# Patient Record
Sex: FEMALE | Race: BLACK OR AFRICAN AMERICAN | Employment: OTHER | URBAN - METROPOLITAN AREA
[De-identification: names, ages, dates, MRNs, and addresses within clinical notes are randomized per-mention and may not be internally consistent; named-entity substitution may affect disease eponyms.]

---

## 2017-03-31 ENCOUNTER — HOSPITAL ENCOUNTER (OUTPATIENT)
Dept: ULTRASOUND IMAGING | Age: 62
Discharge: HOME | End: 2017-03-31
Attending: INTERNAL MEDICINE
Payer: COMMERCIAL

## 2017-03-31 DIAGNOSIS — R13.10 DIFFICULTY IN SWALLOWING: ICD-10-CM

## 2017-03-31 PROCEDURE — 76536 US EXAM OF HEAD AND NECK: CPT

## 2017-04-11 ENCOUNTER — HOSPITAL ENCOUNTER (OUTPATIENT)
Dept: ONCOLOGY | Age: 62
Discharge: HOME | End: 2017-04-11

## 2017-04-11 ENCOUNTER — HOSPITAL ENCOUNTER (OUTPATIENT)
Dept: LAB | Age: 62
Discharge: HOME | End: 2017-04-11
Payer: COMMERCIAL

## 2017-04-11 ENCOUNTER — OFFICE VISIT (OUTPATIENT)
Dept: ONCOLOGY | Age: 62
End: 2017-04-11

## 2017-04-11 VITALS
DIASTOLIC BLOOD PRESSURE: 68 MMHG | HEIGHT: 69 IN | TEMPERATURE: 97.6 F | BODY MASS INDEX: 28.29 KG/M2 | HEART RATE: 65 BPM | SYSTOLIC BLOOD PRESSURE: 131 MMHG | WEIGHT: 191 LBS

## 2017-04-11 DIAGNOSIS — C50.212 MALIGNANT NEOPLASM OF UPPER-INNER QUADRANT OF LEFT FEMALE BREAST (HCC): ICD-10-CM

## 2017-04-11 DIAGNOSIS — C50.212 MALIGNANT NEOPLASM OF UPPER-INNER QUADRANT OF LEFT FEMALE BREAST (HCC): Primary | ICD-10-CM

## 2017-04-11 LAB
ALBUMIN SERPL BCP-MCNC: 4.4 G/DL (ref 3.4–5)
ALBUMIN/GLOB SERPL: 1.2 {RATIO} (ref 0.8–1.7)
ALP SERPL-CCNC: 93 U/L (ref 45–117)
ALT SERPL-CCNC: 16 U/L (ref 13–56)
ANION GAP BLD CALC-SCNC: 9 MMOL/L (ref 3–18)
AST SERPL W P-5'-P-CCNC: 14 U/L (ref 15–37)
BASO+EOS+MONOS # BLD AUTO: 0.6 K/UL (ref 0–2.3)
BASO+EOS+MONOS # BLD AUTO: 9 % (ref 0.1–17)
BILIRUB SERPL-MCNC: 0.4 MG/DL (ref 0.2–1)
BUN SERPL-MCNC: 22 MG/DL (ref 7–18)
BUN/CREAT SERPL: 22 (ref 12–20)
CALCIUM SERPL-MCNC: 10.2 MG/DL (ref 8.5–10.1)
CHLORIDE SERPL-SCNC: 101 MMOL/L (ref 100–108)
CO2 SERPL-SCNC: 31 MMOL/L (ref 21–32)
CREAT SERPL-MCNC: 0.99 MG/DL (ref 0.6–1.3)
DIFFERENTIAL METHOD BLD: ABNORMAL
ERYTHROCYTE [DISTWIDTH] IN BLOOD BY AUTOMATED COUNT: 13.6 % (ref 11.5–14.5)
GLOBULIN SER CALC-MCNC: 3.7 G/DL (ref 2–4)
GLUCOSE SERPL-MCNC: 89 MG/DL (ref 74–99)
HCT VFR BLD AUTO: 34.2 % (ref 36–48)
HGB BLD-MCNC: 11.4 G/DL (ref 12–16)
LYMPHOCYTES # BLD AUTO: 27 % (ref 14–44)
LYMPHOCYTES # BLD: 1.9 K/UL (ref 1.1–5.9)
MCH RBC QN AUTO: 29.1 PG (ref 25–35)
MCHC RBC AUTO-ENTMCNC: 33.3 G/DL (ref 31–37)
MCV RBC AUTO: 87.2 FL (ref 78–102)
NEUTS SEG # BLD: 4.5 K/UL (ref 1.8–9.5)
NEUTS SEG NFR BLD AUTO: 65 % (ref 40–70)
PLATELET # BLD AUTO: 230 K/UL (ref 135–420)
POTASSIUM SERPL-SCNC: 3.4 MMOL/L (ref 3.5–5.5)
PROT SERPL-MCNC: 8.1 G/DL (ref 6.4–8.2)
RBC # BLD AUTO: 3.92 M/UL (ref 4.1–5.1)
SODIUM SERPL-SCNC: 141 MMOL/L (ref 136–145)
WBC # BLD AUTO: 7 K/UL (ref 4.5–13)

## 2017-04-11 PROCEDURE — 80053 COMPREHEN METABOLIC PANEL: CPT | Performed by: INTERNAL MEDICINE

## 2017-04-11 PROCEDURE — 36415 COLL VENOUS BLD VENIPUNCTURE: CPT | Performed by: INTERNAL MEDICINE

## 2017-04-11 PROCEDURE — 86300 IMMUNOASSAY TUMOR CA 15-3: CPT | Performed by: INTERNAL MEDICINE

## 2017-04-11 NOTE — PROGRESS NOTES
Hematology/Oncology Consultation Note    Name: Ailyn Cervantes  Date: 2017  : 1955    PCP: Sarah Beth Chacko MD       Ms. Devang Pickett  is a 60 y.o. -American woman who was referred for an evaluation and treatment recommendations for newly diagnosed invasive ductal adenocarcinoma of the left breast.    Subjective:     Chief complaint: Breast cancer    History of present illness:  Ms. Devang Pickett is a 54-year-old -American woman who recently underwent her screening mammograms which showed an abnormality in the left breast which was concerning. This screening mammogram was completed on 3/6/2017. On 3/15/2017 she had a unilateral diagnostic mammogram and unilateral ultrasound which documented a lesion at the 11 o'clock position in the left breast.  The patient denied ever feeling a mass lesion on self breast exam.  The tumor measured 4 x 3 x 4 mm and was subsequently biopsied on 3/17/2017. The biopsy confirmed an invasive ductal adenocarcinoma, moderately well differentiated. There was no evidence of an in situ component and there was no lymphovascular invasion identified. Further tests on the specimen revealed that the tumor was estrogen receptor positive and progesterone receptor positive but HER-2/david negative. On reviewing the notes from her surgeon she has a clinically palpable left axillary lymph node and is tentatively scheduled to undergo further testing. She denies having any nipple discharge or nipple retraction. Her family history is notable for heart disease in her mother and colon cancer in her mother but no family history of breast cancer is documented. History reviewed. No pertinent past medical history.     No Known Allergies    Past Surgical History:   Procedure Laterality Date    BREAST SURGERY PROCEDURE UNLISTED      HX HYSTERECTOMY         Social History     Social History    Marital status: UNKNOWN     Spouse name: N/A    Number of children: N/A    Years of education: N/A     Occupational History    Not on file. Social History Main Topics    Smoking status: Never Smoker    Smokeless tobacco: Not on file    Alcohol use No    Drug use: Not on file    Sexual activity: Not on file     Other Topics Concern    Not on file     Social History Narrative       Family History   Problem Relation Age of Onset    Heart Disease Mother     Cancer Mother      colon    Diabetes Father     Cancer Brother      prostate    Alzheimer Paternal Uncle     Other Maternal Grandfather      old age   [facility] Cancer Brother      colon    Alzheimer Cousin        Review of Systems    General ROS:The patient has no complaints and there is no physical distress evident. Psychological ROS: patient denies having any psychological symptoms such as hallucinations, depression or anxiety. Ophthalmic ROS:the patient denies having any visual impairment or eye discomfort. ENT ROS: there are no abnormalities reported. Allergy and Immunology ROS:the patient denies having any seasonal allergies or allergies to medications other than those already outlined above. Hematological and Lymphatic ROS: the patient denies having any bruising, bleeding or lymphadenopathy. Endocrine ROS: the patient denies having any heat or cold intolerance. There is no history of diabetes or thyroid disorders. Breast ROS: the patient has recently been diagnosed with invasive ductal adenocarcinoma of the left breast  Respiratory ROS:the patient denies having any cough, shortness of breath, or dyspnea on exertion. Cardiovascular ROS: there are no complaints of chest pain, palpitations, chest pounding, or dyspnea on exertion. Gastrointestinal ROS: the patient denies having nausea, emesis, diarrhea, constipation, or blood in the stool. Genito-Urinary ROS: the patient denies having urinary urgency, frequency, or dysuria.   Musculoskeletal ROS: with the exception of mild arthralgias the patient has no other musculoskeletal complaints. Neurological ROS: the patient denies having any numbness, tingling, or neurologic deficits. Dermatological ROS:patient denies having any unexplained rash, skin ulcerations, or hives. Objective:     Visit Vitals    /68    Pulse 65    Temp 97.6 °F (36.4 °C)    Ht 5' 9\" (1.753 m)    Wt 86.6 kg (191 lb)    BMI 28.21 kg/m2        Physical Exam:   Gen. Appearance: the patient is in no acute distress. Skin: There is no evidence of bruise or rash. HEENT: The head is normocephalic and atraumatic. The conjunctiva and sclera are clear. Pupils are equal, round, reactive to light, and accommodation. The extraocular movements are intact. ENT reveals no oral mucosal lesions or ulcerations. Neck: Supple without lymphadenopathy or thyromegaly. Anterior chest wall and breast: The left breast shows a biopsy site. There is no evidence of nipple retraction, nipple discharge, or skin dimpling. The axilla has evidence of a possible pathologically enlarged lymph node on the left. Lungs: Clear to auscultation and percussion; there are no wheezes or rhonchi. Heart: Regular rate and rhythm; there are no murmurs, gallops, or rubs. Abdomen: Bowel sounds are present and normal.  There is no guarding, tenderness, or hepatosplenomegaly. Extremities: There is no clubbing, cyanosis, or edema. Neurologic: There are no focal neurologic deficits. Lymphatics: There is no palpable peripheral lymphadenopathy. Lab data: The pathology report dated 3/17/2017 of a left breast specimen is diagnostic for invasive ductal adenocarcinoma, moderately well-differentiated. Immunohistochemical stains for the estrogen receptor and progesterone receptor positive. HER-2/david was negative.          Assessment:   Moderately well-differentiated invasive ductal adenocarcinoma, left breast: ER/OR positive and HER-2 negative    Plan:   Mildly well-differentiated invasive ductal adenocarcinoma, left breast the tumor is ER/OR positive and HER-2 negative. The patient is tentatively scheduled to undergo a lumpectomy on 4/13/2017. I have explained to the patient that we will plan to see her back in clinic in about 2 weeks following surgery to finalize her treatment regimen. I reviewed the current NCCN the main guidelines for early-stage breast cancer and since her tumor is hormone receptor positive for treatment modality will probably be comprised of a combination of post lumpectomy radiation treatment and antiestrogen therapy in the form of tamoxifen at a dose of 20 mg daily versus an aromatase inhibitor such as Femara at 2.5 mg daily. The risk and benefit profile of these options have been reviewed and she will return in 2 weeks to finalize her treatment regimen after the final pathologic staging has been completed. Follow-up in 2 weeks. Orders Placed This Encounter    COMPLETE CBC & AUTO DIFF WBC    InHouse CBC ([employer])     Standing Status:   Future     Number of Occurrences:   1     Standing Expiration Date:   8/02/2890    METABOLIC PANEL, COMPREHENSIVE     Standing Status:   Future     Number of Occurrences:   1     Standing Expiration Date:   4/12/2018    CA 27.29     Standing Status:   Future     Number of Occurrences:   1     Standing Expiration Date:   4/12/2018           Fred Witt MD  4/11/2017      Please note: This document has been produced using voice recognition software. Unrecognized errors in transcription may be present.

## 2017-04-11 NOTE — PATIENT INSTRUCTIONS
Breast Cancer: Care Instructions  Your Care Instructions  Breast cancer occurs when abnormal cells grow out of control in the breast. These cancer cells can spread within the breast, to nearby lymph nodes and other tissues, and to other parts of the body. Being treated for cancer can weaken your body, and you may feel very tired. Get the rest your body needs so you can feel better. Finding out that you have cancer is scary. You may feel many emotions and may need some help coping. Seek out family, friends, and counselors for support. You also can do things at home to make yourself feel better while you go through treatment. Call the [employer] (0-538.579.8536) or visit its website at [employer] for more information. Follow-up care is a key part of your treatment and safety. Be sure to make and go to all appointments, and call your doctor if you are having problems. It's also a good idea to know your test results and keep a list of the medicines you take. How can you care for yourself at home? · Take your medicines exactly as prescribed. Call your doctor if you think you are having a problem with your medicine. You may get medicine for nausea and vomiting if you have these side effects. · Follow your doctor's instructions to relieve pain. Pain from cancer and surgery can almost always be controlled. Use pain medicine when you first notice pain, before it becomes severe. · Eat healthy food. If you do not feel like eating, try to eat food that has protein and extra calories to keep up your strength and prevent weight loss. Drink liquid meal replacements for extra calories and protein. Try to eat your main meal early. · Get some physical activity every day, but do not get too tired. Keep doing the hobbies you enjoy as your energy allows. · Do not smoke. Smoking can make your cancer worse. If you need help quitting, talk to your doctor about stop-smoking programs and medicines.  These can increase your chances of quitting for good. · Take steps to control your stress and workload. Learn relaxation techniques. ¨ Share your feelings. Stress and tension affect our emotions. By expressing your feelings to others, you may be able to understand and cope with them. ¨ Consider joining a support group. Talking about a problem with your spouse, a good friend, or other people with similar problems is a good way to reduce tension and stress. ¨ Express yourself through art. Try writing, crafts, dance, or art to relieve stress. Some dance, writing, or art groups may be available just for people who have cancer. ¨ Be kind to your body and mind. Getting enough sleep, eating a healthy diet, and taking time to do things you enjoy can contribute to an overall feeling of balance in your life and can help reduce stress. ¨ Get help if you need it. Discuss your concerns with your doctor or counselor. · If you are vomiting or have diarrhea:  ¨ Drink plenty of fluids (enough so that your urine is light yellow or clear like water) to prevent dehydration. Choose water and other caffeine-free clear liquids. If you have kidney, heart, or liver disease and have to limit fluids, talk with your doctor before you increase the amount of fluids you drink. ¨ When you are able to eat, try clear soups, mild foods, and liquids until all symptoms are gone for 12 to 48 hours. Other good choices include dry toast, crackers, cooked cereal, and gelatin dessert, such as Jell-O.  · If you have not already done so, prepare a list of advance directives. Advance directives are instructions to your doctor and family members about what kind of care you want if you become unable to speak or express yourself. When should you call for help? Call your doctor now or seek immediate medical care if:  · You have a fever. · Any part of your breast becomes red, tender, swollen, or hot.   · You have pain, redness, or swelling in the arm on the same side as your breast cancer. Watch closely for changes in your health, and be sure to contact your doctor if:  · You have pain that is not controlled by medicine. · You have nausea or vomiting. · You are constipated or have diarrhea. Where can you learn more? Go to http://ashlie-funmilayo.info/. Enter V321 in the search box to learn more about \"Breast Cancer: Care Instructions. \"  Current as of: July 26, 2016  Content Version: 11.2  © 2840-3986 [employer] Care instructions adapted under license by [employer] (which disclaims liability or warranty for this information). If you have questions about a medical condition or this instruction, always ask your healthcare professional. [address] any warranty or liability for your use of this information.

## 2017-04-11 NOTE — MR AVS SNAPSHOT
Visit Information Date & Time Provider Department Dept. Phone Encounter #  
 4/11/2017  2:30 PM Colletta Hector, [address] Office [phone] [id number] Follow-up Instructions Return in about 2 weeks (around 4/25/2017). Your Appointments 4/25/2017  3:15 PM  
Office Visit with Colletta Hector, MD  
[facility]en[facility] Barnett ([address]) Appt Note: RESULTS  
 [facility] [address] [facility] [address] [facility] [address]  
[phone]  
  
   
 [facility] [address] [address] [place] Upcoming Health Maintenance Date Due Hepatitis C Screening 1955 DTaP/Tdap/Td series (1 - Tdap) 2/14/1975 PAP AKA CERVICAL CYTOLOGY 2/14/1975 FOBT Q 1 YEAR AGE 50-75 2/14/2004 ZOSTER VACCINE AGE 60> 2/14/2014 INFLUENZA AGE 9 TO ADULT 8/1/2016 BREAST CANCER SCRN MAMMOGRAM 3/25/2018 Allergies as of 4/11/2017  Never Reviewed Not on File Current Immunizations  Never Reviewed No immunizations on file. Not reviewed this visit You Were Diagnosed With   
  
 Codes Comments Malignant neoplasm of upper-inner quadrant of left female breast [facility])    -  Primary ICD-10-CM: E80.573 ICD-9-CM: 174.2 Your Updated Medication List  
  
Notice  As of 4/11/2017  3:33 PM  
 You have not been prescribed any medications. Follow-up Instructions Return in about 2 weeks (around 4/25/2017). To-Do List   
 04/17/2017 8:30 AM  
  Appointment with [facility] RADIATION ONCOLOGY at [facility] RAD[facility] ([phone]) This appointment time is simply a place kelly and may not reflect the true appointment time. If patient does not know the appointment time given to them at the time of scheduling, they should contact the Radiation Therapy department for detail. Patient Instructions Breast Cancer: Care Instructions Your Care Instructions Breast cancer occurs when abnormal cells grow out of control in the breast. These cancer cells can spread within the breast, to nearby lymph nodes and other tissues, and to other parts of the body. Being treated for cancer can weaken your body, and you may feel very tired. Get the rest your body needs so you can feel better. Finding out that you have cancer is scary. You may feel many emotions and may need some help coping. Seek out family, friends, and counselors for support. You also can do things at home to make yourself feel better while you go through treatment. Call the [employer] (1-728.667.3233) or visit its website at [employer] for more information. Follow-up care is a key part of your treatment and safety. Be sure to make and go to all appointments, and call your doctor if you are having problems. It's also a good idea to know your test results and keep a list of the medicines you take. How can you care for yourself at home? · Take your medicines exactly as prescribed. Call your doctor if you think you are having a problem with your medicine. You may get medicine for nausea and vomiting if you have these side effects. · Follow your doctor's instructions to relieve pain. Pain from cancer and surgery can almost always be controlled. Use pain medicine when you first notice pain, before it becomes severe. · Eat healthy food. If you do not feel like eating, try to eat food that has protein and extra calories to keep up your strength and prevent weight loss. Drink liquid meal replacements for extra calories and protein. Try to eat your main meal early. · Get some physical activity every day, but do not get too tired. Keep doing the hobbies you enjoy as your energy allows. · Do not smoke. Smoking can make your cancer worse. If you need help quitting, talk to your doctor about stop-smoking programs and medicines. These can increase your chances of quitting for good. · Take steps to control your stress and workload. Learn relaxation techniques. ¨ Share your feelings. Stress and tension affect our emotions. By expressing your feelings to others, you may be able to understand and cope with them. ¨ Consider joining a support group. Talking about a problem with your spouse, a good friend, or other people with similar problems is a good way to reduce tension and stress. ¨ Express yourself through art. Try writing, crafts, dance, or art to relieve stress. Some dance, writing, or art groups may be available just for people who have cancer. ¨ Be kind to your body and mind. Getting enough sleep, eating a healthy diet, and taking time to do things you enjoy can contribute to an overall feeling of balance in your life and can help reduce stress. ¨ Get help if you need it. Discuss your concerns with your doctor or counselor. · If you are vomiting or have diarrhea: ¨ Drink plenty of fluids (enough so that your urine is light yellow or clear like water) to prevent dehydration. Choose water and other caffeine-free clear liquids. If you have kidney, heart, or liver disease and have to limit fluids, talk with your doctor before you increase the amount of fluids you drink. ¨ When you are able to eat, try clear soups, mild foods, and liquids until all symptoms are gone for 12 to 48 hours. Other good choices include dry toast, crackers, cooked cereal, and gelatin dessert, such as Jell-O. · If you have not already done so, prepare a list of advance directives. Advance directives are instructions to your doctor and family members about what kind of care you want if you become unable to speak or express yourself. When should you call for help? Call your doctor now or seek immediate medical care if: 
· You have a fever. · Any part of your breast becomes red, tender, swollen, or hot. · You have pain, redness, or swelling in the arm on the same side as your breast cancer. Watch closely for changes in your health, and be sure to contact your doctor if: · You have pain that is not controlled by medicine. · You have nausea or vomiting. · You are constipated or have diarrhea. Where can you learn more? Go to http://ashlie-funmilayo.info/. Enter V321 in the search box to learn more about \"Breast Cancer: Care Instructions. \" Current as of: July 26, 2016 Content Version: 11.2 © 6724-9563 [employer]. Care instructions adapted under license by [employer] (which disclaims liability or warranty for this information). If you have questions about a medical condition or this instruction, always ask your healthcare professional. [address] any warranty or liability for your use of this information. Please provide this summary of care documentation to your next provider. Your primary care clinician is listed as Luis Fernando Cardenas. If you have any questions after today's visit, please call [phone].

## 2017-04-12 LAB — CANCER AG27-29 SERPL-ACNC: 18.3 U/ML (ref 0–38.6)

## 2017-04-17 ENCOUNTER — HOSPITAL ENCOUNTER (OUTPATIENT)
Dept: RADIATION THERAPY | Age: 62
Discharge: HOME | End: 2017-04-17
Payer: COMMERCIAL

## 2017-04-17 PROCEDURE — 99211 OFF/OP EST MAY X REQ PHY/QHP: CPT

## 2017-04-25 ENCOUNTER — OFFICE VISIT (OUTPATIENT)
Dept: ONCOLOGY | Age: 62
End: 2017-04-25

## 2017-04-25 VITALS
TEMPERATURE: 98.1 F | WEIGHT: 190 LBS | HEIGHT: 69 IN | DIASTOLIC BLOOD PRESSURE: 74 MMHG | HEART RATE: 54 BPM | BODY MASS INDEX: 28.14 KG/M2 | SYSTOLIC BLOOD PRESSURE: 147 MMHG

## 2017-04-25 DIAGNOSIS — C50.212 MALIGNANT NEOPLASM OF UPPER-INNER QUADRANT OF LEFT FEMALE BREAST (HCC): Primary | ICD-10-CM

## 2017-04-25 RX ORDER — LETROZOLE 2.5 MG/1
2.5 TABLET, FILM COATED ORAL DAILY
Qty: 90 TAB | Refills: 3 | Status: SHIPPED | OUTPATIENT
Start: 2017-04-25 | End: 2018-04-04 | Stop reason: SDUPTHER

## 2017-04-25 NOTE — MR AVS SNAPSHOT
Visit Information Date & Time Provider Department Dept. Phone Encounter #  
 4/25/2017  3:15 PM Anushka Aldana [address] Office [phone] [id number] Follow-up Instructions Return in about 4 weeks (around 5/23/2017). Your Appointments 5/23/2017  3:30 PM  
Office Visit with MD Deven Riggs [address] [place] [address]-[facility]) Appt Note: 1 month follow up appointment/Amairani Ritter  
 [facility] [address] [facility] [address] [facility] [address]  
[phone]  
  
   
 [facility] [address] [place] [address] [address] [place] Upcoming Health Maintenance Date Due Hepatitis C Screening 1955 Pneumococcal 19-64 Highest Risk (1 of 3 - PCV13) 2/14/1973 DTaP/Tdap/Td series (1 - Tdap) 2/14/1975 PAP AKA CERVICAL CYTOLOGY 2/14/1975 FOBT Q 1 YEAR AGE 50-75 2/14/2004 ZOSTER VACCINE AGE 60> 2/14/2014 INFLUENZA AGE 9 TO ADULT 8/1/2016 BREAST CANCER SCRN MAMMOGRAM 3/25/2018 Allergies as of 4/25/2017  Review Complete On: 4/23/2017 By: Anushka Aldana MD  
 No Known Allergies Current Immunizations  Never Reviewed No immunizations on file. Not reviewed this visit You Were Diagnosed With   
  
 Codes Comments Malignant neoplasm of upper-inner quadrant of left female breast [facility])    -  Primary ICD-10-CM: L36.323 ICD-9-CM: 174.2 Vitals BP Pulse Temp Height(growth percentile) Weight(growth percentile) BMI  
 147/74 (!) 54 98.1 °F (36.7 °C) 5' 9\" (1.753 m) 190 lb (86.2 kg) 28.06 kg/m2 Smoking Status Never Smoker BMI and BSA Data Body Mass Index Body Surface Area 28.06 kg/m 2 2.05 m 2 Preferred Pharmacy Pharmacy Name Phone [facility] [address] [phone] Your Updated Medication List  
  
   
This list is accurate as of: 4/25/17  4:26 PM.  Always use your most recent med list.  
  
  
  
  
 letrozole 2.5 mg tablet Commonly known as:  [facility] Take 1 Tab by mouth daily. Prescriptions Sent to Pharmacy Refills  
 letrozole (FEMARA) 2.5 mg tablet 3 Sig: Take 1 Tab by mouth daily. Class: Normal  
 Pharmacy: [facility] [address]  #: [phone] Route: Oral  
  
Follow-up Instructions Return in about 4 weeks (around 5/23/2017). To-Do List   
 05/11/2017 8:00 AM  
  Appointment with [facility] RADIATION ONCOLOGY at [facility] RADIATION THERAPY ([phone]) This appointment time is simply a place kelly and may not reflect the true appointment time. If patient does not know the appointment time given to them at the time of scheduling, they should contact the Radiation Therapy department for detail. Patient Instructions Breast Cancer: Care Instructions Your Care Instructions Breast cancer occurs when abnormal cells grow out of control in the breast. These cancer cells can spread within the breast, to nearby lymph nodes and other tissues, and to other parts of the body. Being treated for cancer can weaken your body, and you may feel very tired. Get the rest your body needs so you can feel better. Finding out that you have cancer is scary. You may feel many emotions and may need some help coping. Seek out family, friends, and counselors for support. You also can do things at home to make yourself feel better while you go through treatment. Call the [facility] (5-827.605.8273) or visit its website at [address]. [employer] for more information. Follow-up care is a key part of your treatment and safety. Be sure to make and go to all appointments, and call your doctor if you are having problems. It's also a good idea to know your test results and keep a list of the medicines you take. How can you care for yourself at home? · Take your medicines exactly as prescribed.  Call your doctor if you think you are having a problem with your medicine. You may get medicine for nausea and vomiting if you have these side effects. · Follow your doctor's instructions to relieve pain. Pain from cancer and surgery can almost always be controlled. Use pain medicine when you first notice pain, before it becomes severe. · Eat healthy food. If you do not feel like eating, try to eat food that has protein and extra calories to keep up your strength and prevent weight loss. Drink liquid meal replacements for extra calories and protein. Try to eat your main meal early. · Get some physical activity every day, but do not get too tired. Keep doing the hobbies you enjoy as your energy allows. · Do not smoke. Smoking can make your cancer worse. If you need help quitting, talk to your doctor about stop-smoking programs and medicines. These can increase your chances of quitting for good. · Take steps to control your stress and workload. Learn relaxation techniques. ¨ Share your feelings. Stress and tension affect our emotions. By expressing your feelings to others, you may be able to understand and cope with them. ¨ Consider joining a support group. Talking about a problem with your spouse, a good friend, or other people with similar problems is a good way to reduce tension and stress. ¨ Express yourself through art. Try writing, crafts, dance, or art to relieve stress. Some dance, writing, or art groups may be available just for people who have cancer. ¨ Be kind to your body and mind. Getting enough sleep, eating a healthy diet, and taking time to do things you enjoy can contribute to an overall feeling of balance in your life and can help reduce stress. ¨ Get help if you need it. Discuss your concerns with your doctor or counselor. · If you are vomiting or have diarrhea: ¨ Drink plenty of fluids (enough so that your urine is light yellow or clear like water) to prevent dehydration.  Choose water and other caffeine-free clear liquids. If you have kidney, heart, or liver disease and have to limit fluids, talk with your doctor before you increase the amount of fluids you drink. ¨ When you are able to eat, try clear soups, mild foods, and liquids until all symptoms are gone for 12 to 48 hours. Other good choices include dry toast, crackers, cooked cereal, and gelatin dessert, such as Jell-O. · If you have not already done so, prepare a list of advance directives. Advance directives are instructions to your doctor and family members about what kind of care you want if you become unable to speak or express yourself. When should you call for help? Call your doctor now or seek immediate medical care if: 
· You have a fever. · Any part of your breast becomes red, tender, swollen, or hot. · You have pain, redness, or swelling in the arm on the same side as your breast cancer. Watch closely for changes in your health, and be sure to contact your doctor if: 
· You have pain that is not controlled by medicine. · You have nausea or vomiting. · You are constipated or have diarrhea. Where can you learn more? Go to http://ashlie-funmilayo.info/. Enter V321 in the search box to learn more about \"Breast Cancer: Care Instructions. \" Current as of: July 26, 2016 Content Version: 11.2 © 8225-8547 [employer]. Care instructions adapted under license by Remember [employer] (which disclaims liability or warranty for this information). If you have questions about a medical condition or this instruction, always ask your healthcare professional. [place] [address] any warranty or liability for your use of this information. Please provide this summary of care documentation to your next provider. Your primary care clinician is listed as Jhonathan Chand. If you have any questions after today's visit, please call [phone].

## 2017-04-25 NOTE — PROGRESS NOTES
Hematology/medical oncology progress note    4/25/2017  Davie Lechuga  YOB: 1955    PCP: Dr. 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 The tumor is HER-2 negative and lymph node negative    Ms. Enoc Duarte has completed her lumpectomy and is currently being evaluated for postsurgical radiation treatment by the radiation oncologist.  I have informed the patient that her tumor is hormone receptor positive and HER-2 negative. She is postmenopausal and therefore I have recommended antiestrogen therapy in the form of an aromatase inhibitor, Femara. The dose is 2.5 mg daily. I have reviewed the risk, benefits, and side effects of the treatment modality with the patient. Written information on the medication was provided to the patient for her review. She has consented to proceed with treatment as recommended and therefore a prescription for a 90 day supply of Femara was submitted to her pharmacy. She also has 3 refills for a total of 1 full year of medication. The patient had her questions answered to her satisfaction. I will plan to see her back in clinic in 1 month to see how well she is tolerating the medication and she will be seen at 3 month intervals thereafter. The baseline CA-27-29 level was normal at 18.3 U/mL. The patient understands that her treatment modality for early stage breast cancer is for curative intent. Total time 25 minutes, greater than 50% of the time was in counseling and coordination of care.     Julieann Canavan, MD, [place]

## 2017-04-25 NOTE — PATIENT INSTRUCTIONS
Breast Cancer: Care Instructions  Your Care Instructions  Breast cancer occurs when abnormal cells grow out of control in the breast. These cancer cells can spread within the breast, to nearby lymph nodes and other tissues, and to other parts of the body. Being treated for cancer can weaken your body, and you may feel very tired. Get the rest your body needs so you can feel better. Finding out that you have cancer is scary. You may feel many emotions and may need some help coping. Seek out family, friends, and counselors for support. You also can do things at home to make yourself feel better while you go through treatment. Call the [employer] (0-105.797.1776) or visit its website at Jelly[employer]aster[employer]o for more information. Follow-up care is a key part of your treatment and safety. Be sure to make and go to all appointments, and call your doctor if you are having problems. It's also a good idea to know your test results and keep a list of the medicines you take. How can you care for yourself at home? · Take your medicines exactly as prescribed. Call your doctor if you think you are having a problem with your medicine. You may get medicine for nausea and vomiting if you have these side effects. · Follow your doctor's instructions to relieve pain. Pain from cancer and surgery can almost always be controlled. Use pain medicine when you first notice pain, before it becomes severe. · Eat healthy food. If you do not feel like eating, try to eat food that has protein and extra calories to keep up your strength and prevent weight loss. Drink liquid meal replacements for extra calories and protein. Try to eat your main meal early. · Get some physical activity every day, but do not get too tired. Keep doing the hobbies you enjoy as your energy allows. · Do not smoke. Smoking can make your cancer worse. If you need help quitting, talk to your doctor about stop-smoking programs and medicines.  These can increase your chances of quitting for good. · Take steps to control your stress and workload. Learn relaxation techniques. ¨ Share your feelings. Stress and tension affect our emotions. By expressing your feelings to others, you may be able to understand and cope with them. ¨ Consider joining a support group. Talking about a problem with your spouse, a good friend, or other people with similar problems is a good way to reduce tension and stress. ¨ Express yourself through art. Try writing, crafts, dance, or art to relieve stress. Some dance, writing, or art groups may be available just for people who have cancer. ¨ Be kind to your body and mind. Getting enough sleep, eating a healthy diet, and taking time to do things you enjoy can contribute to an overall feeling of balance in your life and can help reduce stress. ¨ Get help if you need it. Discuss your concerns with your doctor or counselor. · If you are vomiting or have diarrhea:  ¨ Drink plenty of fluids (enough so that your urine is light yellow or clear like water) to prevent dehydration. Choose water and other caffeine-free clear liquids. If you have kidney, heart, or liver disease and have to limit fluids, talk with your doctor before you increase the amount of fluids you drink. ¨ When you are able to eat, try clear soups, mild foods, and liquids until all symptoms are gone for 12 to 48 hours. Other good choices include dry toast, crackers, cooked cereal, and gelatin dessert, such as Jell-O.  · If you have not already done so, prepare a list of advance directives. Advance directives are instructions to your doctor and family members about what kind of care you want if you become unable to speak or express yourself. When should you call for help? Call your doctor now or seek immediate medical care if:  · You have a fever. · Any part of your breast becomes red, tender, swollen, or hot.   · You have pain, redness, or swelling in the arm on the same side as your breast cancer. Watch closely for changes in your health, and be sure to contact your doctor if:  · You have pain that is not controlled by medicine. · You have nausea or vomiting. · You are constipated or have diarrhea. Where can you learn more? Go to http://ashlie-funmilayo.info/. Enter V321 in the search box to learn more about \"Breast Cancer: Care Instructions. \"  Current as of: July 26, 2016  Content Version: 11.2  © 0585-0326 [employer]. Care instructions adapted under license by [employer] (which disclaims liability or warranty for this information). If you have questions about a medical condition or this instruction, always ask your healthcare professional. [address] any warranty or liability for your use of this information.

## 2017-04-27 ENCOUNTER — TELEPHONE (OUTPATIENT)
Dept: ONCOLOGY | Age: 62
End: 2017-04-27

## 2017-04-27 NOTE — TELEPHONE ENCOUNTER
Patient wants to be sure you are aware that she takes allergy shots every 3 weeks for dust, dirt, mold, dog, cat and wants to know if that will have any effect on the LETROZOLE. She is at [phone]. If she is not home please leave a message.

## 2017-05-08 ENCOUNTER — HOSPITAL ENCOUNTER (OUTPATIENT)
Dept: LAB | Age: 62
Discharge: HOME | End: 2017-05-08
Payer: COMMERCIAL

## 2017-05-08 LAB — POTASSIUM SERPL-SCNC: 3.7 MMOL/L (ref 3.5–5.5)

## 2017-05-08 PROCEDURE — 84132 ASSAY OF SERUM POTASSIUM: CPT | Performed by: INTERNAL MEDICINE

## 2017-05-08 PROCEDURE — 36415 COLL VENOUS BLD VENIPUNCTURE: CPT | Performed by: INTERNAL MEDICINE

## 2017-05-11 ENCOUNTER — HOSPITAL ENCOUNTER (OUTPATIENT)
Dept: RADIATION THERAPY | Age: 62
Discharge: HOME | End: 2017-05-11
Payer: COMMERCIAL

## 2017-05-11 PROCEDURE — 77290 THER RAD SIMULAJ FIELD CPLX: CPT

## 2017-05-11 PROCEDURE — 77332 RADIATION TREATMENT AID(S): CPT

## 2017-05-18 ENCOUNTER — HOSPITAL ENCOUNTER (OUTPATIENT)
Dept: RADIATION THERAPY | Age: 62
Discharge: HOME | End: 2017-05-18
Payer: COMMERCIAL

## 2017-05-18 PROCEDURE — 77295 3-D RADIOTHERAPY PLAN: CPT

## 2017-05-18 PROCEDURE — 77334 RADIATION TREATMENT AID(S): CPT

## 2017-05-18 PROCEDURE — 77300 RADIATION THERAPY DOSE PLAN: CPT

## 2017-05-19 ENCOUNTER — HOSPITAL ENCOUNTER (OUTPATIENT)
Dept: RADIATION THERAPY | Age: 62
Discharge: HOME | End: 2017-05-19
Payer: COMMERCIAL

## 2017-05-19 PROCEDURE — 77280 THER RAD SIMULAJ FIELD SMPL: CPT

## 2017-05-22 ENCOUNTER — HOSPITAL ENCOUNTER (OUTPATIENT)
Dept: RADIATION THERAPY | Age: 62
Discharge: HOME | End: 2017-05-22
Payer: COMMERCIAL

## 2017-05-22 PROCEDURE — 77412 RADIATION TX DELIVERY LVL 3: CPT

## 2017-05-23 ENCOUNTER — OFFICE VISIT (OUTPATIENT)
Dept: ONCOLOGY | Age: 62
End: 2017-05-23

## 2017-05-23 ENCOUNTER — HOSPITAL ENCOUNTER (OUTPATIENT)
Dept: RADIATION THERAPY | Age: 62
Discharge: HOME | End: 2017-05-23
Payer: COMMERCIAL

## 2017-05-23 ENCOUNTER — HOSPITAL ENCOUNTER (OUTPATIENT)
Dept: ONCOLOGY | Age: 62
Discharge: HOME | End: 2017-05-23

## 2017-05-23 VITALS
WEIGHT: 189 LBS | TEMPERATURE: 98.1 F | HEART RATE: 69 BPM | BODY MASS INDEX: 27.99 KG/M2 | SYSTOLIC BLOOD PRESSURE: 127 MMHG | HEIGHT: 69 IN | DIASTOLIC BLOOD PRESSURE: 62 MMHG

## 2017-05-23 DIAGNOSIS — C50.212 MALIGNANT NEOPLASM OF UPPER-INNER QUADRANT OF LEFT FEMALE BREAST (HCC): Primary | ICD-10-CM

## 2017-05-23 DIAGNOSIS — C50.212 MALIGNANT NEOPLASM OF UPPER-INNER QUADRANT OF LEFT FEMALE BREAST (HCC): ICD-10-CM

## 2017-05-23 LAB
BASO+EOS+MONOS # BLD AUTO: 0.6 K/UL (ref 0–2.3)
BASO+EOS+MONOS # BLD AUTO: 8 % (ref 0.1–17)
DIFFERENTIAL METHOD BLD: ABNORMAL
ERYTHROCYTE [DISTWIDTH] IN BLOOD BY AUTOMATED COUNT: 13.5 % (ref 11.5–14.5)
HCT VFR BLD AUTO: 33.8 % (ref 36–48)
HGB BLD-MCNC: 11.4 G/DL (ref 12–16)
LYMPHOCYTES # BLD AUTO: 25 % (ref 14–44)
LYMPHOCYTES # BLD: 1.9 K/UL (ref 1.1–5.9)
MCH RBC QN AUTO: 29.6 PG (ref 25–35)
MCHC RBC AUTO-ENTMCNC: 33.7 G/DL (ref 31–37)
MCV RBC AUTO: 87.8 FL (ref 78–102)
NEUTS SEG # BLD: 5.1 K/UL (ref 1.8–9.5)
NEUTS SEG NFR BLD AUTO: 67 % (ref 40–70)
PLATELET # BLD AUTO: 222 K/UL (ref 135–420)
RBC # BLD AUTO: 3.85 M/UL (ref 4.1–5.1)
WBC # BLD AUTO: 7.6 K/UL (ref 4.5–13)

## 2017-05-23 PROCEDURE — 77412 RADIATION TX DELIVERY LVL 3: CPT

## 2017-05-23 NOTE — PROGRESS NOTES
Hematology/medical oncology progress note    5/23/2017  Lolita Holt  YOB: 1955    PCP: Dr. Roman Hamilton    Diagnosis: Stage I invasive ductal adenocarcinoma of the left breast, ER positive and VA positive. The tumor is HER-2 negative and lymph node negative    Ms. Ana Cristina Hollingsworth has completed her lumpectomy and is currently being evaluated for postsurgical radiation treatment by the radiation oncologist.  I have informed the patient that her tumor is hormone receptor positive and HER-2 negative. She is postmenopausal and therefore I have recommended antiestrogen therapy in the form of an aromatase inhibitor, Femara. The dose is 2.5 mg daily. I have reviewed the risk, benefits, and side effects of the treatment modality with the patient. Written information on the medication was provided to the patient for her review. She has consented to proceed with treatment as recommended and therefore a prescription for a 90 day supply of Femara was submitted to her pharmacy. She also has 3 refills for a total of 1 full year of medication. The patient had her questions answered to her satisfaction. Today, she is here reporting that she is tolerating the medication well. She has not experienced any untoward side effects. On reviewing her CBC from today she has a mild anemia with a hemoglobin of 11.4 and hematocrit of 33.8%. I recommended that she begin using over-the-counter ferrous sulfate or Slow Fe once daily. I will check iron profile and ferritin levels at this time. The patient is proceeding with external beam radiation therapy. I will plan to see her back in clinic in about 3 months for a complete reassessment. She had additional questions answered to her satisfaction regarding the Femara and the use of oral iron supplementation. Total time 25 minutes, greater than 50% of the time was in counseling and coordination of care.     Fortunato Marino MD, [place]

## 2017-05-23 NOTE — PATIENT INSTRUCTIONS
Breast Cancer: Care Instructions  Your Care Instructions  Breast cancer occurs when abnormal cells grow out of control in the breast. These cancer cells can spread within the breast, to nearby lymph nodes and other tissues, and to other parts of the body. Being treated for cancer can weaken your body, and you may feel very tired. Get the rest your body needs so you can feel better. Finding out that you have cancer is scary. You may feel many emotions and may need some help coping. Seek out family, friends, and counselors for support. You also can do things at home to make yourself feel better while you go through treatment. Call the [employer] (5-653.428.6499) or visit its website at [employer] for more information. Follow-up care is a key part of your treatment and safety. Be sure to make and go to all appointments, and call your doctor if you are having problems. It's also a good idea to know your test results and keep a list of the medicines you take. How can you care for yourself at home? · Take your medicines exactly as prescribed. Call your doctor if you think you are having a problem with your medicine. You may get medicine for nausea and vomiting if you have these side effects. · Follow your doctor's instructions to relieve pain. Pain from cancer and surgery can almost always be controlled. Use pain medicine when you first notice pain, before it becomes severe. · Eat healthy food. If you do not feel like eating, try to eat food that has protein and extra calories to keep up your strength and prevent weight loss. Drink liquid meal replacements for extra calories and protein. Try to eat your main meal early. · Get some physical activity every day, but do not get too tired. Keep doing the hobbies you enjoy as your energy allows. · Do not smoke. Smoking can make your cancer worse. If you need help quitting, talk to your doctor about stop-smoking programs and medicines.  These can increase your chances of quitting for good. · Take steps to control your stress and workload. Learn relaxation techniques. ¨ Share your feelings. Stress and tension affect our emotions. By expressing your feelings to others, you may be able to understand and cope with them. ¨ Consider joining a support group. Talking about a problem with your spouse, a good friend, or other people with similar problems is a good way to reduce tension and stress. ¨ Express yourself through art. Try writing, crafts, dance, or art to relieve stress. Some dance, writing, or art groups may be available just for people who have cancer. ¨ Be kind to your body and mind. Getting enough sleep, eating a healthy diet, and taking time to do things you enjoy can contribute to an overall feeling of balance in your life and can help reduce stress. ¨ Get help if you need it. Discuss your concerns with your doctor or counselor. · If you are vomiting or have diarrhea:  ¨ Drink plenty of fluids (enough so that your urine is light yellow or clear like water) to prevent dehydration. Choose water and other caffeine-free clear liquids. If you have kidney, heart, or liver disease and have to limit fluids, talk with your doctor before you increase the amount of fluids you drink. ¨ When you are able to eat, try clear soups, mild foods, and liquids until all symptoms are gone for 12 to 48 hours. Other good choices include dry toast, crackers, cooked cereal, and gelatin dessert, such as Jell-O.  · If you have not already done so, prepare a list of advance directives. Advance directives are instructions to your doctor and family members about what kind of care you want if you become unable to speak or express yourself. When should you call for help? Call your doctor now or seek immediate medical care if:  · You have a fever. · Any part of your breast becomes red, tender, swollen, or hot.   · You have pain, redness, or swelling in the arm on the same side as your breast cancer. Watch closely for changes in your health, and be sure to contact your doctor if:  · You have pain that is not controlled by medicine. · You have nausea or vomiting. · You are constipated or have diarrhea. Where can you learn more? Go to http://ashlie-funmilayo.info/. Enter V321 in the search box to learn more about \"Breast Cancer: Care Instructions. \"  Current as of: July 26, 2016  Content Version: 11.2  © 1331-8352 [employer]. Care instructions adapted under license by [employer] (which disclaims liability or warranty for this information). If you have questions about a medical condition or this instruction, always ask your healthcare professional. [address] any warranty or liability for your use of this information.

## 2017-05-23 NOTE — MR AVS SNAPSHOT
Visit Information Date & Time Provider Department Dept. Phone Encounter #  
 5/23/2017  3:30 PM Lauryn Perdomo [facility] Office [phone] [id number] Follow-up Instructions Return in about 3 months (around 8/23/2017). Your Appointments 8/25/2017 10:00 AM  
Office Visit with Lauryn Perdomo MD  
[place]  [address] [place] [address]  
[phone]  
  
   
 [address] Upcoming Health Maintenance Date Due Hepatitis C Screening 1955 Pneumococcal 19-64 Highest Risk (1 of 3 - PCV13) 2/14/1973 DTaP/Tdap/Td series (1 - Tdap) 2/14/1975 PAP AKA CERVICAL CYTOLOGY 2/14/1975 FOBT Q 1 YEAR AGE 50-75 2/14/2004 ZOSTER VACCINE AGE 60> 2/14/2014 INFLUENZA AGE 9 TO ADULT 8/1/2017 BREAST CANCER SCRN MAMMOGRAM 3/25/2018 Allergies as of 5/23/2017  Review Complete On: 5/23/2017 By: Lauryn Perdomo MD  
 No Known Allergies Current Immunizations  Never Reviewed No immunizations on file. Not reviewed this visit You Were Diagnosed With   
  
 Codes Comments Malignant neoplasm of upper-inner quadrant of left female breast [facility])    -  Primary ICD-10-CM: M48.139 ICD-9-CM: 174.2 Vitals BP Pulse Temp Height(growth percentile) Weight(growth percentile) BMI  
 127/62 69 98.1 °F (36.7 °C) 5' 9\" (1.753 m) 189 lb (85.7 kg) 27.91 kg/m2 Smoking Status Never Smoker BMI and BSA Data Body Mass Index Body Surface Area  
 27.91 kg/m 2 2.04 m 2 Preferred Pharmacy Pharmacy Name Phone [facility] [address] [phone] Your Updated Medication List  
  
   
This list is accurate as of: 5/23/17  3:45 PM.  Always use your most recent med list.  
  
  
  
  
 letrozole 2.5 mg tablet Commonly known as:  [facility] Take 1 Tab by mouth daily. We Performed the Following COMPLETE CBC & AUTO DIFF WBC [63255 CPT(R)] Follow-up Instructions Return in about 3 months (around 8/23/2017). To-Do List   
 05/23/2017 Lab:  CBC WITH 3 PART DIFF   
  
 05/24/2017 8:00 AM  
  Appointment with [facility] RADIATION ONCOLOGY at [facility] RADIATION [facility] ([phone]) This appointment time is simply a place kelly and may not reflect the true appointment time. If patient does not know the appointment time given to them at the time of scheduling, they should contact the Radiation Therapy department for detail. 05/25/2017 8:00 AM  
  Appointment with [facility] RADIATION ONCOLOGY at [facility] ([phone]) This appointment time is simply a place kelly and may not reflect the true appointment time. If patient does not know the appointment time given to them at the time of scheduling, they should contact the Radiation Therapy department for detail.  
  
 05/26/2017 8:00 AM  
  Appointment with [facility] RADIATION ONCOLOGY at [facility] ([phone]) This appointment time is simply a place kelly and may not reflect the true appointment time. If patient does not know the appointment time given to them at the time of scheduling, they should contact the Radiation Therapy department for detail. 05/30/2017 8:00 AM  
  Appointment with [facility] RADIATION ONCOLOGY at [facility] RADIATION [facility] ([phone]) This appointment time is simply a place kelly and may not reflect the true appointment time. If patient does not know the appointment time given to them at the time of scheduling, they should contact the Radiation Therapy department for detail. 05/31/2017 8:00 AM  
  Appointment with [facility] RADIATION ONCOLOGY at [facility] RADIATION [facility] ([phone]) This appointment time is simply a place kelly and may not reflect the true appointment time.   If patient does not know the appointment time given to them at the time of scheduling, they should contact the Radiation Therapy department for detail. 06/01/2017 8:00 AM  
  Appointment with [facility] RADIATION ONCOLOGY at [facility] RADIATION [facility] ([phone]) This appointment time is simply a place kelly and may not reflect the true appointment time. If patient does not know the appointment time given to them at the time of scheduling, they should contact the Radiation Therapy department for detail. 06/02/2017 8:00 AM  
  Appointment with [facility] RADIATION ONCOLOGY at [facility] RADIATION [facility] ([phone]) This appointment time is simply a place kelly and may not reflect the true appointment time. If patient does not know the appointment time given to them at the time of scheduling, they should contact the Radiation Therapy department for detail. 06/05/2017 8:00 AM  
  Appointment with [facility] RADIATION ONCOLOGY at [facility] RADIATION [facility] ([phone]) This appointment time is simply a place kelly and may not reflect the true appointment time. If patient does not know the appointment time given to them at the time of scheduling, they should contact the Radiation Therapy department for detail. 06/06/2017 8:00 AM  
  Appointment with [facility] RADIATION ONCOLOGY at [facility] ([phone]) This appointment time is simply a place kelly and may not reflect the true appointment time. If patient does not know the appointment time given to them at the time of scheduling, they should contact the Radiation Therapy department for detail. 06/07/2017 8:00 AM  
  Appointment with [facility] RADIATION ONCOLOGY at [facility] RADIATION [facility] ([phone]) This appointment time is simply a place kelly and may not reflect the true appointment time. If patient does not know the appointment time given to them at the time of scheduling, they should contact the Radiation Therapy department for detail.   
  
 06/08/2017 8:00 AM  
 Appointment with [facility] RADIATION ONCOLOGY at [facility] RADIATION [facility] ([phone]) This appointment time is simply a place kelly and may not reflect the true appointment time. If patient does not know the appointment time given to them at the time of scheduling, they should contact the Radiation Therapy department for detail. 06/09/2017 8:00 AM  
  Appointment with [facility] RADIATION ONCOLOGY at [facility] RADIATION [facility] ([phone]) This appointment time is simply a place kelly and may not reflect the true appointment time. If patient does not know the appointment time given to them at the time of scheduling, they should contact the Radiation Therapy department for detail.  
  
 06/12/2017 8:00 AM  
  Appointment with [facility] RADIATION ONCOLOGY at [facility] RADIATION [facility] ([phone]) This appointment time is simply a place kelly and may not reflect the true appointment time. If patient does not know the appointment time given to them at the time of scheduling, they should contact the Radiation Therapy department for detail.  
  
 06/13/2017 8:00 AM  
  Appointment with [facility] RADIATION ONCOLOGY at [facility] RADIATION [facility] ([phone]) This appointment time is simply a place kelly and may not reflect the true appointment time. If patient does not know the appointment time given to them at the time of scheduling, they should contact the Radiation Therapy department for detail.  
  
 06/14/2017 8:00 AM  
  Appointment with [facility] RADIATION ONCOLOGY at [facility] RADIATION THERAPY ([phone]) This appointment time is simply a place kelly and may not reflect the true appointment time. If patient does not know the appointment time given to them at the time of scheduling, they should contact the Radiation Therapy department for detail.  
  
 06/15/2017 8:00 AM  
  Appointment with [facility] RADIATION ONCOLOGY at [facility] RADIATION THERAPY ([phone]) This appointment time is simply a place kelly and may not reflect the true appointment time. If patient does not know the appointment time given to them at the time of scheduling, they should contact the Radiation Therapy department for detail.  
  
 06/16/2017 8:00 AM  
  Appointment with [facility] RADIATION ONCOLOGY at [facility] RADIATION THERAPY ([phone]) This appointment time is simply a place kelly and may not reflect the true appointment time. If patient does not know the appointment time given to them at the time of scheduling, they should contact the Radiation Therapy department for detail. Patient Instructions Breast Cancer: Care Instructions Your Care Instructions Breast cancer occurs when abnormal cells grow out of control in the breast. These cancer cells can spread within the breast, to nearby lymph nodes and other tissues, and to other parts of the body. Being treated for cancer can weaken your body, and you may feel very tired. Get the rest your body needs so you can feel better. Finding out that you have cancer is scary. You may feel many emotions and may need some help coping. Seek out family, friends, and counselors for support. You also can do things at home to make yourself feel better while you go through treatment. Call the [employer] (1-124.243.1543) or visit its website at [address] [employer] for more information. Follow-up care is a key part of your treatment and safety. Be sure to make and go to all appointments, and call your doctor if you are having problems. It's also a good idea to know your test results and keep a list of the medicines you take. How can you care for yourself at home? · Take your medicines exactly as prescribed. Call your doctor if you think you are having a problem with your medicine. You may get medicine for nausea and vomiting if you have these side effects. · Follow your doctor's instructions to relieve pain. Pain from cancer and surgery can almost always be controlled.  Use pain medicine when you first notice pain, before it becomes severe. · Eat healthy food. If you do not feel like eating, try to eat food that has protein and extra calories to keep up your strength and prevent weight loss. Drink liquid meal replacements for extra calories and protein. Try to eat your main meal early. · Get some physical activity every day, but do not get too tired. Keep doing the hobbies you enjoy as your energy allows. · Do not smoke. Smoking can make your cancer worse. If you need help quitting, talk to your doctor about stop-smoking programs and medicines. These can increase your chances of quitting for good. · Take steps to control your stress and workload. Learn relaxation techniques. ¨ Share your feelings. Stress and tension affect our emotions. By expressing your feelings to others, you may be able to understand and cope with them. ¨ Consider joining a support group. Talking about a problem with your spouse, a good friend, or other people with similar problems is a good way to reduce tension and stress. ¨ Express yourself through art. Try writing, crafts, dance, or art to relieve stress. Some dance, writing, or art groups may be available just for people who have cancer. ¨ Be kind to your body and mind. Getting enough sleep, eating a healthy diet, and taking time to do things you enjoy can contribute to an overall feeling of balance in your life and can help reduce stress. ¨ Get help if you need it. Discuss your concerns with your doctor or counselor. · If you are vomiting or have diarrhea: ¨ Drink plenty of fluids (enough so that your urine is light yellow or clear like water) to prevent dehydration. Choose water and other caffeine-free clear liquids. If you have kidney, heart, or liver disease and have to limit fluids, talk with your doctor before you increase the amount of fluids you drink.  
¨ When you are able to eat, try clear soups, mild foods, and liquids until all symptoms are gone for 12 to 48 hours. Other good choices include dry toast, crackers, cooked cereal, and gelatin dessert, such as Jell-O. · If you have not already done so, prepare a list of advance directives. Advance directives are instructions to your doctor and family members about what kind of care you want if you become unable to speak or express yourself. When should you call for help? Call your doctor now or seek immediate medical care if: 
· You have a fever. · Any part of your breast becomes red, tender, swollen, or hot. · You have pain, redness, or swelling in the arm on the same side as your breast cancer. Watch closely for changes in your health, and be sure to contact your doctor if: 
· You have pain that is not controlled by medicine. · You have nausea or vomiting. · You are constipated or have diarrhea. Where can you learn more? Go to http://ashlie-funmilayo.info/. Enter V321 in the search box to learn more about \"Breast Cancer: Care Instructions. \" Current as of: July 26, 2016 Content Version: 11.2 © 5746-2069 [employer]. Care instructions adapted under license by [employer] (which disclaims liability or warranty for this information). If you have questions about a medical condition or this instruction, always ask your healthcare professional. [address] any warranty or liability for your use of this information. Please provide this summary of care documentation to your next provider. Your primary care clinician is listed as Oj Landin. If you have any questions after today's visit, please call [phone].

## 2017-05-24 ENCOUNTER — HOSPITAL ENCOUNTER (OUTPATIENT)
Dept: RADIATION THERAPY | Age: 62
Discharge: HOME | End: 2017-05-24
Payer: COMMERCIAL

## 2017-05-24 PROCEDURE — 77412 RADIATION TX DELIVERY LVL 3: CPT

## 2017-05-25 ENCOUNTER — HOSPITAL ENCOUNTER (OUTPATIENT)
Dept: RADIATION THERAPY | Age: 62
Discharge: HOME | End: 2017-05-25
Payer: COMMERCIAL

## 2017-05-25 PROCEDURE — 77412 RADIATION TX DELIVERY LVL 3: CPT

## 2017-05-26 ENCOUNTER — HOSPITAL ENCOUNTER (OUTPATIENT)
Dept: RADIATION THERAPY | Age: 62
Discharge: HOME | End: 2017-05-26
Payer: COMMERCIAL

## 2017-05-26 PROCEDURE — 77336 RADIATION PHYSICS CONSULT: CPT

## 2017-05-26 PROCEDURE — 77412 RADIATION TX DELIVERY LVL 3: CPT

## 2017-05-30 ENCOUNTER — HOSPITAL ENCOUNTER (OUTPATIENT)
Dept: RADIATION THERAPY | Age: 62
Discharge: HOME | End: 2017-05-30
Payer: COMMERCIAL

## 2017-05-30 PROCEDURE — 77412 RADIATION TX DELIVERY LVL 3: CPT

## 2017-05-30 PROCEDURE — 77417 THER RADIOLOGY PORT IMAGE(S): CPT

## 2017-05-31 ENCOUNTER — HOSPITAL ENCOUNTER (OUTPATIENT)
Dept: RADIATION THERAPY | Age: 62
Discharge: HOME | End: 2017-05-31
Payer: COMMERCIAL

## 2017-05-31 PROCEDURE — 77412 RADIATION TX DELIVERY LVL 3: CPT

## 2017-06-01 ENCOUNTER — APPOINTMENT (OUTPATIENT)
Dept: RADIATION THERAPY | Age: 62
End: 2017-06-01
Payer: COMMERCIAL

## 2017-06-02 ENCOUNTER — APPOINTMENT (OUTPATIENT)
Dept: RADIATION THERAPY | Age: 62
End: 2017-06-02
Payer: COMMERCIAL

## 2017-06-05 ENCOUNTER — HOSPITAL ENCOUNTER (OUTPATIENT)
Dept: RADIATION THERAPY | Age: 62
Discharge: HOME | End: 2017-06-05
Payer: COMMERCIAL

## 2017-06-05 PROCEDURE — 77412 RADIATION TX DELIVERY LVL 3: CPT

## 2017-06-06 ENCOUNTER — HOSPITAL ENCOUNTER (OUTPATIENT)
Dept: RADIATION THERAPY | Age: 62
Discharge: HOME | End: 2017-06-06
Payer: COMMERCIAL

## 2017-06-06 PROCEDURE — 77412 RADIATION TX DELIVERY LVL 3: CPT

## 2017-06-07 ENCOUNTER — HOSPITAL ENCOUNTER (OUTPATIENT)
Dept: RADIATION THERAPY | Age: 62
Discharge: HOME | End: 2017-06-07
Payer: COMMERCIAL

## 2017-06-07 PROCEDURE — 77336 RADIATION PHYSICS CONSULT: CPT

## 2017-06-07 PROCEDURE — 77412 RADIATION TX DELIVERY LVL 3: CPT

## 2017-06-08 ENCOUNTER — HOSPITAL ENCOUNTER (OUTPATIENT)
Dept: RADIATION THERAPY | Age: 62
Discharge: HOME | End: 2017-06-08
Payer: COMMERCIAL

## 2017-06-08 PROCEDURE — 77412 RADIATION TX DELIVERY LVL 3: CPT

## 2017-06-08 PROCEDURE — 77417 THER RADIOLOGY PORT IMAGE(S): CPT

## 2017-06-09 ENCOUNTER — HOSPITAL ENCOUNTER (OUTPATIENT)
Dept: RADIATION THERAPY | Age: 62
Discharge: HOME | End: 2017-06-09
Payer: COMMERCIAL

## 2017-06-09 PROCEDURE — 77412 RADIATION TX DELIVERY LVL 3: CPT

## 2017-06-12 ENCOUNTER — HOSPITAL ENCOUNTER (OUTPATIENT)
Dept: RADIATION THERAPY | Age: 62
Discharge: HOME | End: 2017-06-12
Payer: COMMERCIAL

## 2017-06-12 PROCEDURE — 77412 RADIATION TX DELIVERY LVL 3: CPT

## 2017-06-13 ENCOUNTER — HOSPITAL ENCOUNTER (OUTPATIENT)
Dept: RADIATION THERAPY | Age: 62
Discharge: HOME | End: 2017-06-13
Payer: COMMERCIAL

## 2017-06-13 PROCEDURE — 77412 RADIATION TX DELIVERY LVL 3: CPT

## 2017-06-15 ENCOUNTER — HOSPITAL ENCOUNTER (OUTPATIENT)
Dept: RADIATION THERAPY | Age: 62
Discharge: HOME | End: 2017-06-15
Payer: COMMERCIAL

## 2017-06-15 PROCEDURE — 77412 RADIATION TX DELIVERY LVL 3: CPT

## 2017-06-16 ENCOUNTER — APPOINTMENT (OUTPATIENT)
Dept: RADIATION THERAPY | Age: 62
End: 2017-06-16
Payer: COMMERCIAL

## 2017-08-02 ENCOUNTER — HOSPITAL ENCOUNTER (OUTPATIENT)
Dept: RADIATION THERAPY | Age: 62
Discharge: HOME | End: 2017-08-02
Payer: COMMERCIAL

## 2017-08-02 PROCEDURE — 99211 OFF/OP EST MAY X REQ PHY/QHP: CPT

## 2017-09-01 ENCOUNTER — OFFICE VISIT (OUTPATIENT)
Dept: ONCOLOGY | Age: 62
End: 2017-09-01

## 2017-09-01 ENCOUNTER — HOSPITAL ENCOUNTER (OUTPATIENT)
Dept: ONCOLOGY | Age: 62
Discharge: HOME | End: 2017-09-01

## 2017-09-01 VITALS
SYSTOLIC BLOOD PRESSURE: 127 MMHG | BODY MASS INDEX: 27.67 KG/M2 | HEART RATE: 54 BPM | TEMPERATURE: 97.9 F | WEIGHT: 187.4 LBS | DIASTOLIC BLOOD PRESSURE: 72 MMHG

## 2017-09-01 DIAGNOSIS — C50.212 MALIGNANT NEOPLASM OF UPPER-INNER QUADRANT OF LEFT FEMALE BREAST (HCC): ICD-10-CM

## 2017-09-01 DIAGNOSIS — D50.8 IRON DEFICIENCY ANEMIA SECONDARY TO INADEQUATE DIETARY IRON INTAKE: ICD-10-CM

## 2017-09-01 DIAGNOSIS — C50.212 MALIGNANT NEOPLASM OF UPPER-INNER QUADRANT OF LEFT FEMALE BREAST (HCC): Primary | ICD-10-CM

## 2017-09-01 LAB
BASO+EOS+MONOS # BLD AUTO: 0.4 K/UL (ref 0–2.3)
BASO+EOS+MONOS # BLD AUTO: 8 % (ref 0.1–17)
DIFFERENTIAL METHOD BLD: ABNORMAL
ERYTHROCYTE [DISTWIDTH] IN BLOOD BY AUTOMATED COUNT: 13.1 % (ref 11.5–14.5)
HCT VFR BLD AUTO: 35 % (ref 36–48)
HGB BLD-MCNC: 11.8 G/DL (ref 12–16)
LYMPHOCYTES # BLD: 0.9 K/UL (ref 1.1–5.9)
LYMPHOCYTES NFR BLD: 19 % (ref 14–44)
MCH RBC QN AUTO: 29.9 PG (ref 25–35)
MCHC RBC AUTO-ENTMCNC: 33.7 G/DL (ref 31–37)
MCV RBC AUTO: 88.8 FL (ref 78–102)
NEUTS SEG # BLD: 3.7 K/UL (ref 1.8–9.5)
NEUTS SEG NFR BLD: 73 % (ref 40–70)
PLATELET # BLD AUTO: 194 K/UL (ref 135–420)
RBC # BLD AUTO: 3.94 M/UL (ref 4.1–5.1)
WBC # BLD AUTO: 5 K/UL (ref 4.5–13)

## 2017-09-01 NOTE — MR AVS SNAPSHOT
Visit Information Date & Time Provider Department Dept. Phone Encounter #  
 9/1/2017 10:00 AM [facility] [phone] [id number] Follow-up Instructions Return in about 3 months (around 12/1/2017). Upcoming Health Maintenance Date Due Hepatitis C Screening 1955 Pneumococcal 19-64 Highest Risk (1 of 3 - PCV13) 2/14/1973 DTaP/Tdap/Td series (1 - Tdap) 2/14/1975 PAP AKA CERVICAL CYTOLOGY 2/14/1975 FOBT Q 1 YEAR AGE 50-75 2/14/2004 ZOSTER VACCINE AGE 60> 12/14/2013 INFLUENZA AGE 9 TO ADULT 8/1/2017 BREAST CANCER SCRN MAMMOGRAM 3/25/2018 Allergies as of 9/1/2017  Review Complete On: 5/23/2017 By: Yokasta Kendall MD  
 No Known Allergies Current Immunizations  Never Reviewed No immunizations on file. Not reviewed this visit You Were Diagnosed With   
  
 Codes Comments Malignant neoplasm of upper-inner quadrant of left female breast [facility])    -  Primary ICD-10-CM: X01.236 ICD-9-CM: 174.2 Iron deficiency anemia secondary to inadequate dietary iron intake     ICD-10-CM: D50.8 ICD-9-CM: 280.1 Vitals BP Pulse Temp Weight(growth percentile) BMI Smoking Status 127/72 (BP 1 Location: Right arm, BP Patient Position: Sitting) (!) 54 97.9 °F (36.6 °C) (Oral) 187 lb 6.4 oz (85 kg) 27.67 kg/m2 Never Smoker BMI and BSA Data Body Mass Index Body Surface Area  
 27.67 kg/m 2 2.03 m 2 Preferred Pharmacy Pharmacy Name Phone [facility] - [address] [phone] Your Updated Medication List  
  
   
This list is accurate as of: 9/1/17 11:19 AM.  Always use your most recent med list.  
  
  
  
  
 letrozole 2.5 mg tablet Commonly known as:  [facility] Take 1 Tab by mouth daily. We Performed the Following COMPLETE CBC & AUTO DIFF WBC [39781 CPT(R)] Follow-up Instructions Return in about 3 months (around 12/1/2017). To-Do List   
 09/01/2017 Lab:  CBC WITH 3 PART DIFF   
  
 09/25/2017 10:30 AM  
  Appointment with [address] [facility] [address] at [address] [place] [address] ([phone]) OUTSIDE FILMS  - Any outside films related to the study being scheduled should be brought with you on the day of the exam.  If this cannot be done there may be a delay in the reading of the study. MEDICATIONS  - Patient must bring a complete list of all medications currently taking to include prescriptions, over-the-counter meds, herbals, vitamins & any dietary supplements Patient Instructions Breast Cancer: Care Instructions Your Care Instructions Breast cancer occurs when abnormal cells grow out of control in the breast. These cancer cells can spread within the breast, to nearby lymph nodes and other tissues, and to other parts of the body. Being treated for cancer can weaken your body, and you may feel very tired. Get the rest your body needs so you can feel better. Finding out that you have cancer is scary. You may feel many emotions and may need some help coping. Seek out family, friends, and counselors for support. You also can do things at home to make yourself feel better while you go through treatment. Call the [employer] (6-557.992.1993) or visit its website at [address] [employer] for more information. Follow-up care is a key part of your treatment and safety. Be sure to make and go to all appointments, and call your doctor if you are having problems. It's also a good idea to know your test results and keep a list of the medicines you take. How can you care for yourself at home? · Take your medicines exactly as prescribed. Call your doctor if you think you are having a problem with your medicine. You may get medicine for nausea and vomiting if you have these side effects. · Follow your doctor's instructions to relieve pain.  Pain from cancer and surgery can almost always be controlled. Use pain medicine when you first notice pain, before it becomes severe. · Eat healthy food. If you do not feel like eating, try to eat food that has protein and extra calories to keep up your strength and prevent weight loss. Drink liquid meal replacements for extra calories and protein. Try to eat your main meal early. · Get some physical activity every day, but do not get too tired. Keep doing the hobbies you enjoy as your energy allows. · Do not smoke. Smoking can make your cancer worse. If you need help quitting, talk to your doctor about stop-smoking programs and medicines. These can increase your chances of quitting for good. · Take steps to control your stress and workload. Learn relaxation techniques. ¨ Share your feelings. Stress and tension affect our emotions. By expressing your feelings to others, you may be able to understand and cope with them. ¨ Consider joining a support group. Talking about a problem with your spouse, a good friend, or other people with similar problems is a good way to reduce tension and stress. ¨ Express yourself through art. Try writing, crafts, dance, or art to relieve stress. Some dance, writing, or art groups may be available just for people who have cancer. ¨ Be kind to your body and mind. Getting enough sleep, eating a healthy diet, and taking time to do things you enjoy can contribute to an overall feeling of balance in your life and can help reduce stress. ¨ Get help if you need it. Discuss your concerns with your doctor or counselor. · If you are vomiting or have diarrhea: ¨ Drink plenty of fluids (enough so that your urine is light yellow or clear like water) to prevent dehydration. Choose water and other caffeine-free clear liquids. If you have kidney, heart, or liver disease and have to limit fluids, talk with your doctor before you increase the amount of fluids you drink. ¨ When you are able to eat, try clear soups, mild foods, and liquids until all symptoms are gone for 12 to 48 hours. Other good choices include dry toast, crackers, cooked cereal, and gelatin dessert, such as Jell-O. · If you have not already done so, prepare a list of advance directives. Advance directives are instructions to your doctor and family members about what kind of care you want if you become unable to speak or express yourself. When should you call for help? Call your doctor now or seek immediate medical care if: 
· You have a fever. · Any part of your breast becomes red, tender, swollen, or hot. · You have pain, redness, or swelling in the arm on the same side as your breast cancer. Watch closely for changes in your health, and be sure to contact your doctor if: 
· You have pain that is not controlled by medicine. · You have nausea or vomiting. · You are constipated or have diarrhea. Where can you learn more? Go to http://ashlie-funmilayo.info/. Enter V321 in the search box to learn more about \"Breast Cancer: Care Instructions. \" Current as of: July 26, 2016 Content Version: 11.3 © 1743-1577 [employer]. Care instructions adapted under license by [employer] (which disclaims liability or warranty for this information). If you have questions about a medical condition or this instruction, always ask your healthcare professional. [place] [address] any warranty or liability for your use of this information. Please provide this summary of care documentation to your next provider. Your primary care clinician is listed as Saray Cruz. If you have any questions after today's visit, please call [phone].

## 2017-09-01 NOTE — PATIENT INSTRUCTIONS
Breast Cancer: Care Instructions  Your Care Instructions  Breast cancer occurs when abnormal cells grow out of control in the breast. These cancer cells can spread within the breast, to nearby lymph nodes and other tissues, and to other parts of the body. Being treated for cancer can weaken your body, and you may feel very tired. Get the rest your body needs so you can feel better. Finding out that you have cancer is scary. You may feel many emotions and may need some help coping. Seek out family, friends, and counselors for support. You also can do things at home to make yourself feel better while you go through treatment. Call the [employer] (5-158.263.9663) or visit its website at [employer] for more information. Follow-up care is a key part of your treatment and safety. Be sure to make and go to all appointments, and call your doctor if you are having problems. It's also a good idea to know your test results and keep a list of the medicines you take. How can you care for yourself at home? · Take your medicines exactly as prescribed. Call your doctor if you think you are having a problem with your medicine. You may get medicine for nausea and vomiting if you have these side effects. · Follow your doctor's instructions to relieve pain. Pain from cancer and surgery can almost always be controlled. Use pain medicine when you first notice pain, before it becomes severe. · Eat healthy food. If you do not feel like eating, try to eat food that has protein and extra calories to keep up your strength and prevent weight loss. Drink liquid meal replacements for extra calories and protein. Try to eat your main meal early. · Get some physical activity every day, but do not get too tired. Keep doing the hobbies you enjoy as your energy allows. · Do not smoke. Smoking can make your cancer worse. If you need help quitting, talk to your doctor about stop-smoking programs and medicines.  These can increase your chances of quitting for good. · Take steps to control your stress and workload. Learn relaxation techniques. ¨ Share your feelings. Stress and tension affect our emotions. By expressing your feelings to others, you may be able to understand and cope with them. ¨ Consider joining a support group. Talking about a problem with your spouse, a good friend, or other people with similar problems is a good way to reduce tension and stress. ¨ Express yourself through art. Try writing, crafts, dance, or art to relieve stress. Some dance, writing, or art groups may be available just for people who have cancer. ¨ Be kind to your body and mind. Getting enough sleep, eating a healthy diet, and taking time to do things you enjoy can contribute to an overall feeling of balance in your life and can help reduce stress. ¨ Get help if you need it. Discuss your concerns with your doctor or counselor. · If you are vomiting or have diarrhea:  ¨ Drink plenty of fluids (enough so that your urine is light yellow or clear like water) to prevent dehydration. Choose water and other caffeine-free clear liquids. If you have kidney, heart, or liver disease and have to limit fluids, talk with your doctor before you increase the amount of fluids you drink. ¨ When you are able to eat, try clear soups, mild foods, and liquids until all symptoms are gone for 12 to 48 hours. Other good choices include dry toast, crackers, cooked cereal, and gelatin dessert, such as Jell-O.  · If you have not already done so, prepare a list of advance directives. Advance directives are instructions to your doctor and family members about what kind of care you want if you become unable to speak or express yourself. When should you call for help? Call your doctor now or seek immediate medical care if:  · You have a fever. · Any part of your breast becomes red, tender, swollen, or hot.   · You have pain, redness, or swelling in the arm on the same side as your breast cancer. Watch closely for changes in your health, and be sure to contact your doctor if:  · You have pain that is not controlled by medicine. · You have nausea or vomiting. · You are constipated or have diarrhea. Where can you learn more? Go to http://ashlie-funmilayo.info/. Enter V321 in the search box to learn more about \"Breast Cancer: Care Instructions. \"  Current as of: July 26, 2016  Content Version: 11.3  © 9859-4469 [employer]. Care instructions adapted under license by [employer] (which disclaims liability or warranty for this information). If you have questions about a medical condition or this instruction, always ask your healthcare professional. [address] any warranty or liability for your use of this information.

## 2017-09-01 NOTE — PROGRESS NOTES
Hematology/Oncology  Progress Note    Name: Kari Kohler  Date: 2017  : 1955    PCP: Pepper Bar MD     Ms. Kat To is a 60 y.o. female who was seen for management of her invasive ductal adenocarcinoma of the left breast.  Current therapy: Femara 2.5 mg daily. Subjective:     Mrs. Kat To is a 50-year-old woman who has a history of invasive ductal adenocarcinoma involving the left breast.  She is doing well. She continues to take the Femara 2.5 mg daily and is tolerating the treatment reasonably well. She has no new complaints or concerns to report. Past medical history, family history, and social history: these were reviewed and remains unchanged. No past medical history on file. Past Surgical History:   Procedure Laterality Date    BREAST SURGERY PROCEDURE UNLISTED      HX HYSTERECTOMY       Social History     Social History    Marital status: UNKNOWN     Spouse name: N/A    Number of children: N/A    Years of education: N/A     Occupational History    Not on file. Social History Main Topics    Smoking status: Never Smoker    Smokeless tobacco: Not on file    Alcohol use No    Drug use: Not on file    Sexual activity: Not on file     Other Topics Concern    Not on file     Social History Narrative     Family History   Problem Relation Age of Onset    Heart Disease Mother     Cancer Mother      colon    Diabetes Father     Cancer Brother      prostate    Alzheimer Paternal Uncle     Other Maternal Grandfather      old age   [facility] Cancer Brother      colon    Alzheimer Cousin      Current Outpatient Prescriptions   Medication Sig Dispense Refill    letrozole (FEMARA) 2.5 mg tablet Take 1 Tab by mouth daily. 90 Tab 3       Review of Systems  Constitutional: The patient has no acute distress or discomfort.   HEENT: The patient denies recent head trauma, eye pain, blurred vision,  hearing deficit, oropharyngeal mucosal pain or lesions, and the patient denies throat pain or discomfort. Lymphatics: The patient denies palpable peripheral lymphadenopathy. Hematologic: The patient denies having bruising, bleeding, or progressive fatigue. Respiratory: Patient denies having shortness of breath, cough, sputum production, fever, or dyspnea on exertion. Cardiovascular: The patient denies having leg pain, leg swelling, heart palpitations, chest permit, chest pain, or lightheadedness. The patient denies having dyspnea on exertion. Gastrointestinal: The patient denies having nausea, emesis, or diarrhea. The patient denies having any hematemesis or blood in the stool. Genitourinary: Patient denies having urinary urgency, frequency, or dysuria. The patient denies having blood in the urine. Psychological: The patient denies having symptoms of nervousness, anxiety, depression, or thoughts of harming self. Skin: Patient denies having skin rashes, skin, ulcerations, or unexplained itching or pruritus. Musculoskeletal: The patient denies having pain in the joints or bones. Objective:     Visit Vitals    /72 (BP 1 Location: Right arm, BP Patient Position: Sitting)    Pulse (!) 54    Temp 97.9 °F (36.6 °C) (Oral)    Wt 85 kg (187 lb 6.4 oz)    BMI 27.67 kg/m2     ECOG PS=0; Pain score=0/10  Physical Exam:   Gen. Appearance: The patient is in no acute distress. Skin: There is no bruise or rash. HEENT: The exam is unremarkable. Neck: Supple without lymphadenopathy or thyromegaly. Lungs: Clear to auscultation and percussion; there are no wheezes or rhonchi. Heart: Regular rate and rhythm; there are no murmurs, gallops, or rubs. Abdomen: Bowel sounds are present and normal.  There is no guarding, tenderness, or hepatosplenomegaly. Extremities: There is no clubbing, cyanosis, or edema. Neurologic: There are no focal neurologic deficits. Lymphatics: There is no palpable peripheral lymphadenopathy. Musculoskeletal: The patient has full range of motion at all joints.   There is no evidence of joint deformity or effusions. There is no focal joint tenderness. Psychological/psychiatric: There is no clinical evidence of anxiety, depression, or melancholy. Lab data:      Results for orders placed or performed during the hospital encounter of 09/01/17   CBC WITH 3 PART DIFF     Status: Abnormal   Result Value Ref Range Status    WBC 5.0 4.5 - 13.0 K/uL Final    RBC 3.94 (L) 4.10 - 5.10 M/uL Final    HGB 11.8 (L) 12.0 - 16.0 g/dL Final    HCT 35.0 (L) 36 - 48 % Final    MCV 88.8 78 - 102 FL Final    MCH 29.9 25.0 - 35.0 PG Final    MCHC 33.7 31 - 37 g/dL Final    RDW 13.1 11.5 - 14.5 % Final    NEUTROPHILS 73 (H) 40 - 70 % Final    MIXED CELLS 8 0.1 - 17 % Final    LYMPHOCYTES 19 14 - 44 % Final    ABS. NEUTROPHILS 3.7 1.8 - 9.5 K/UL Final    ABS. MIXED CELLS 0.4 0.0 - 2.3 K/uL Final    ABS. LYMPHOCYTES 0.9 (L) 1.1 - 5.9 K/UL Final     Comment: Test performed at [place] [address] Location. Results Reviewed by Medical Director. DF AUTOMATED   Final           Assessment:     1. Malignant neoplasm of upper-inner quadrant of left female breast ([address]    2. Iron deficiency anemia secondary to inadequate dietary iron intake      Plan:   Invasive ductal adenocarcinoma left breast: I have encouraged the patient to continue with her self breast exams. She will also continue with annual screening mammography. Femara 2.5 mg daily will be continued. At this time I will check her CA-27-29 level. Functional iron deficiency anemia: I have explained to the patient that her hemoglobin is improving. The CBC from today shows her hemoglobin is 11.8 g/dL with hematocrit 35%. She was encouraged to continue taking the ferrous sulfate 1 tablet daily. I will check iron profile and ferritin levels at this time. Follow-up will occur in 3 months.     Orders Placed This Encounter    COMPLETE CBC & AUTO DIFF WBC    InHouse CBC ([employer])     Standing Status:   Future     Number of Occurrences:   1 Standing Expiration Date:   7/5/1442    METABOLIC PANEL, COMPREHENSIVE     Standing Status:   Future     Standing Expiration Date:   9/2/2018    IRON PROFILE     Standing Status:   Future     Standing Expiration Date:   9/2/2018    FERRITIN     Standing Status:   Future     Standing Expiration Date:   9/2/2018    CA 27.29     Standing Status:   Future     Standing Expiration Date:   9/2/2018       Raudel Wilson MD  9/1/2017      Please note: This document has been produced using voice recognition software. Unrecognized errors in transcription may be present.

## 2017-09-02 LAB
ALBUMIN SERPL-MCNC: 4.5 G/DL (ref 3.6–4.8)
ALBUMIN/GLOB SERPL: 1.6 {RATIO} (ref 1.2–2.2)
ALP SERPL-CCNC: 76 IU/L (ref 39–117)
ALT SERPL-CCNC: 10 IU/L (ref 0–32)
AST SERPL-CCNC: 19 IU/L (ref 0–40)
BILIRUB SERPL-MCNC: 0.4 MG/DL (ref 0–1.2)
BUN SERPL-MCNC: 18 MG/DL (ref 8–27)
BUN/CREAT SERPL: 20 (ref 12–28)
CALCIUM SERPL-MCNC: 9.9 MG/DL (ref 8.7–10.3)
CANCER AG27-29 SERPL-ACNC: 13 U/ML (ref 0–38.6)
CHLORIDE SERPL-SCNC: 96 MMOL/L (ref 96–106)
CO2 SERPL-SCNC: 28 MMOL/L (ref 18–29)
CREAT SERPL-MCNC: 0.88 MG/DL (ref 0.57–1)
FERRITIN SERPL-MCNC: 149 NG/ML (ref 15–150)
GLOBULIN SER CALC-MCNC: 2.8 G/DL (ref 1.5–4.5)
GLUCOSE SERPL-MCNC: 101 MG/DL (ref 65–99)
IRON SATN MFR SERPL: 20 % (ref 15–55)
IRON SERPL-MCNC: 70 UG/DL (ref 27–139)
POTASSIUM SERPL-SCNC: 3.7 MMOL/L (ref 3.5–5.2)
PROT SERPL-MCNC: 7.3 G/DL (ref 6–8.5)
SODIUM SERPL-SCNC: 139 MMOL/L (ref 134–144)
TIBC SERPL-MCNC: 352 UG/DL (ref 250–450)
UIBC SERPL-MCNC: 282 UG/DL (ref 118–369)

## 2017-09-25 ENCOUNTER — HOSPITAL ENCOUNTER (OUTPATIENT)
Dept: ULTRASOUND IMAGING | Age: 62
Discharge: HOME | End: 2017-09-25
Attending: INTERNAL MEDICINE
Payer: COMMERCIAL

## 2017-09-25 DIAGNOSIS — E04.1 THYROID NODULE: ICD-10-CM

## 2017-09-25 PROCEDURE — 76536 US EXAM OF HEAD AND NECK: CPT

## 2017-12-04 ENCOUNTER — HOSPITAL ENCOUNTER (OUTPATIENT)
Dept: ONCOLOGY | Age: 62
Discharge: HOME | End: 2017-12-04

## 2017-12-04 ENCOUNTER — OFFICE VISIT (OUTPATIENT)
Dept: ONCOLOGY | Age: 62
End: 2017-12-04

## 2017-12-04 VITALS
TEMPERATURE: 97.1 F | DIASTOLIC BLOOD PRESSURE: 75 MMHG | BODY MASS INDEX: 28.21 KG/M2 | HEART RATE: 73 BPM | WEIGHT: 191 LBS | SYSTOLIC BLOOD PRESSURE: 119 MMHG

## 2017-12-04 DIAGNOSIS — D50.8 IRON DEFICIENCY ANEMIA SECONDARY TO INADEQUATE DIETARY IRON INTAKE: ICD-10-CM

## 2017-12-04 DIAGNOSIS — C50.212 MALIGNANT NEOPLASM OF UPPER-INNER QUADRANT OF LEFT FEMALE BREAST, UNSPECIFIED ESTROGEN RECEPTOR STATUS (HCC): Primary | ICD-10-CM

## 2017-12-04 DIAGNOSIS — C50.212 MALIGNANT NEOPLASM OF UPPER-INNER QUADRANT OF LEFT FEMALE BREAST, UNSPECIFIED ESTROGEN RECEPTOR STATUS (HCC): ICD-10-CM

## 2017-12-04 LAB
BASO+EOS+MONOS # BLD AUTO: 0.6 K/UL (ref 0–2.3)
BASO+EOS+MONOS # BLD AUTO: 10 % (ref 0.1–17)
DIFFERENTIAL METHOD BLD: ABNORMAL
ERYTHROCYTE [DISTWIDTH] IN BLOOD BY AUTOMATED COUNT: 13.3 % (ref 11.5–14.5)
HCT VFR BLD AUTO: 34.3 % (ref 36–48)
HGB BLD-MCNC: 11.6 G/DL (ref 12–16)
LYMPHOCYTES # BLD: 0.8 K/UL (ref 1.1–5.9)
LYMPHOCYTES NFR BLD: 14 % (ref 14–44)
MCH RBC QN AUTO: 31 PG (ref 25–35)
MCHC RBC AUTO-ENTMCNC: 33.8 G/DL (ref 31–37)
MCV RBC AUTO: 91.7 FL (ref 78–102)
NEUTS SEG # BLD: 4.8 K/UL (ref 1.8–9.5)
NEUTS SEG NFR BLD: 77 % (ref 40–70)
PLATELET # BLD AUTO: 204 K/UL (ref 135–420)
RBC # BLD AUTO: 3.74 M/UL (ref 4.1–5.1)
WBC # BLD AUTO: 6.2 K/UL (ref 4.5–13)

## 2017-12-04 NOTE — PROGRESS NOTES
Hematology/Oncology  Progress Note    Name: Ronnie Casillas  Date: 2017  : 1955    PCP: Robbie Cruz MD     Ms. Enrique Shelton is a 60 y.o. female who was seen for management of her invasive ductal adenocarcinoma of the left breast.  Current therapy: Femara 2.5mg PO daily. Subjective:     Mrs. Enrique Shelton is a 25-year-old woman who has a history of invasive ductal adenocarcinoma involving the left breast.  She is doing well. She continues to take Femara 2.5mg PO daily and is tolerating the treatment reasonably well. She has no new complaints or concerns to report. Past medical history, family history, and social history: these were reviewed and remains unchanged. No past medical history on file. Past Surgical History:   Procedure Laterality Date    BREAST SURGERY PROCEDURE UNLISTED      HX HYSTERECTOMY       Social History     Social History    Marital status: UNKNOWN     Spouse name: N/A    Number of children: N/A    Years of education: N/A     Occupational History    Not on file. Social History Main Topics    Smoking status: Never Smoker    Smokeless tobacco: Not on file    Alcohol use No    Drug use: Not on file    Sexual activity: Not on file     Other Topics Concern    Not on file     Social History Narrative     Family History   Problem Relation Age of Onset    Heart Disease Mother     Cancer Mother      colon    Diabetes Father     Cancer Brother      prostate    Alzheimer Paternal Uncle     Other Maternal Grandfather      old age   [facility] Cancer Brother      colon    Alzheimer Cousin      Current Outpatient Prescriptions   Medication Sig Dispense Refill    letrozole (FEMARA) 2.5 mg tablet Take 1 Tab by mouth daily. 90 Tab 3       Review of Systems  Constitutional: The patient has no acute distress or discomfort.   HEENT: The patient denies recent head trauma, eye pain, blurred vision,  hearing deficit, oropharyngeal mucosal pain or lesions, and the patient denies throat pain or discomfort. Lymphatics: The patient denies palpable peripheral lymphadenopathy. Hematologic: The patient denies having bruising, bleeding, or progressive fatigue. Respiratory: Patient denies having shortness of breath, cough, sputum production, fever, or dyspnea on exertion. Cardiovascular: The patient denies having leg pain, leg swelling, heart palpitations, chest permit, chest pain, or lightheadedness. The patient denies having dyspnea on exertion. Gastrointestinal: The patient denies having nausea, emesis, or diarrhea. The patient denies having any hematemesis or blood in the stool. Genitourinary: Patient denies having urinary urgency, frequency, or dysuria. The patient denies having blood in the urine. Psychological: The patient denies having symptoms of nervousness, anxiety, depression, or thoughts of harming self. Skin: Patient denies having skin rashes, skin, ulcerations, or unexplained itching or pruritus. Musculoskeletal: The patient denies having pain in the joints or bones. Objective:     Visit Vitals    /75 (BP 1 Location: Right arm, BP Patient Position: Sitting)    Pulse 73    Temp 97.1 °F (36.2 °C) (Oral)    Wt 86.6 kg (191 lb)    BMI 28.21 kg/m2     ECOG PS=0; Pain score=0/10  Physical Exam:   Gen. Appearance: The patient is in no acute distress. Skin: There is no bruise or rash. HEENT: The exam is unremarkable. Neck: Supple without lymphadenopathy or thyromegaly. Lungs: Clear to auscultation and percussion; there are no wheezes or rhonchi. Heart: Regular rate and rhythm; there are no murmurs, gallops, or rubs. Abdomen: Bowel sounds are present and normal.  There is no guarding, tenderness, or hepatosplenomegaly. Extremities: There is no clubbing, cyanosis, or edema. Neurologic: There are no focal neurologic deficits. Lymphatics: There is no palpable peripheral lymphadenopathy. Musculoskeletal: The patient has full range of motion at all joints.   There is no evidence of joint deformity or effusions. There is no focal joint tenderness. Psychological/psychiatric: There is no clinical evidence of anxiety, depression, or melancholy. Lab data:      Results for orders placed or performed during the hospital encounter of 12/04/17   CBC WITH 3 PART DIFF     Status: Abnormal   Result Value Ref Range Status    WBC 6.2 4.5 - 13.0 K/uL Final    RBC 3.74 (L) 4.10 - 5.10 M/uL Final    HGB 11.6 (L) 12.0 - 16 g/dL Final    HCT 34.3 (L) 36 - 48 % Final    MCV 91.7 78 - 102 FL Final    MCH 31.0 25.0 - 35.0 PG Final    MCHC 33.8 31 - 37 g/dL Final    RDW 13.3 11.5 - 14.5 % Final    NEUTROPHILS 77 (H) 40 - 70 % Final    MIXED CELLS 10 0.1 - 17 % Final    LYMPHOCYTES 14 14 - 44 % Final    ABS. NEUTROPHILS 4.8 1.8 - 9.5 K/UL Final    ABS. MIXED CELLS 0.6 0.0 - 2.3 K/uL Final    ABS. LYMPHOCYTES 0.8 (L) 1.1 - 5.9 K/UL Final     Comment: Test performed at [place] [address] Location. Results Reviewed by Medical Director. DF AUTOMATED   Final           Assessment:     1. Malignant neoplasm of upper-inner quadrant of left female breast, unspecified estrogen receptor status ([facility] [address]    2. Iron deficiency anemia secondary to inadequate dietary iron intake      Plan:   Invasive ductal adenocarcinoma left breast: I have encouraged the patient to continue with her self breast exams. She will also continue with annual screening mammography. Femara 2.5 mg daily will be continued. Her CA 27-29 on 09/17 was normal at 13.0. At this time I will check her CA-27-29 level. Functional iron deficiency anemia: I have explained to the patient that her hemoglobin is improving. The most recent CBC showed that her hemoglobin is 11.8 g/dL with hematocrit 35%. She was encouraged to continue taking the ferrous sulfate 1 tablet daily. I will check iron profile and ferritin levels at this time. Follow-up will occur in 3 months or sooner if indicated.     Orders Placed This Encounter    COMPLETE CBC & AUTO DIFF WBC    InHouse CBC ([employer])     Standing Status:   Future     Number of Occurrences:   1     Standing Expiration Date:   12/11/2017    IRON PROFILE     Standing Status:   Future     Number of Occurrences:   1     Standing Expiration Date:   12/5/2018    FERRITIN     Standing Status:   Future     Number of Occurrences:   1     Standing Expiration Date:   51/7/6734    METABOLIC PANEL, COMPREHENSIVE     Standing Status:   Future     Number of Occurrences:   1     Standing Expiration Date:   12/5/2018    CA 27.29     Standing Status:   Future     Number of Occurrences:   1     Standing Expiration Date:   30/9/0398    METABOLIC PANEL, COMPREHENSIVE    IRON PROFILE    CA 27.29    FERRITIN       Moises Faith MD  12/4/2017

## 2017-12-04 NOTE — MR AVS SNAPSHOT
Visit Information Date & Time Provider Department Dept. Phone Encounter #  
 12/4/2017  1:45 PM Lisset Real MD [facility] Medical Oncology [phone] [id number] Follow-up Instructions Return in about 3 months (around 3/4/2018). Upcoming Health Maintenance Date Due Hepatitis C Screening 1955 Pneumococcal 19-64 Highest Risk (1 of 3 - PCV13) 2/14/1973 DTaP/Tdap/Td series (1 - Tdap) 2/14/1975 PAP AKA CERVICAL CYTOLOGY 2/14/1975 FOBT Q 1 YEAR AGE 50-75 2/14/2004 ZOSTER VACCINE AGE 60> 12/14/2013 Influenza Age 5 to Adult 8/1/2017 BREAST CANCER SCRN MAMMOGRAM 3/25/2018 Allergies as of 12/4/2017  Review Complete On: 5/23/2017 By: Lisset Real MD  
 No Known Allergies Current Immunizations  Never Reviewed No immunizations on file. Not reviewed this visit You Were Diagnosed With   
  
 Codes Comments Malignant neoplasm of upper-inner quadrant of left female breast, unspecified estrogen receptor status ([facility] [address]    -  Primary ICD-10-CM: J15.518 ICD-9-CM: 174.2 Iron deficiency anemia secondary to inadequate dietary iron intake     ICD-10-CM: D50.8 ICD-9-CM: 280.1 Vitals BP Pulse Temp Weight(growth percentile) BMI Smoking Status 119/75 (BP 1 Location: Right arm, BP Patient Position: Sitting) 73 97.1 °F (36.2 °C) (Oral) 191 lb (86.6 kg) 28.21 kg/m2 Never Smoker BMI and BSA Data Body Mass Index Body Surface Area  
 28.21 kg/m 2 2.05 m 2 Preferred Pharmacy Pharmacy Name Phone [id number] - [address] [phone] Your Updated Medication List  
  
   
This list is accurate as of: 12/4/17  3:05 PM.  Always use your most recent med list.  
  
  
  
  
 letrozole 2.5 mg tablet Commonly known as:  [facility] Take 1 Tab by mouth daily. We Performed the Following CA 27.29 [38956 CPT(R)] COMPLETE CBC & AUTO DIFF WBC [97594 CPT(R)] FERRITIN [97926 CPT(R)] IRON PROFILE [id number] CPT(R)] METABOLIC PANEL, COMPREHENSIVE [60862 CPT(R)] Follow-up Instructions Return in about 3 months (around 3/4/2018). To-Do List   
 12/04/2017 Lab:  CBC WITH 3 PART DIFF Patient Instructions Iron Deficiency Anemia: Care Instructions Your Care Instructions Anemia means that you do not have enough red blood cells. Red blood cells carry oxygen around your body. When you have anemia, it can make you pale, weak, and tired. Many things can cause anemia. The most common cause is loss of blood. This can happen if you have heavy menstrual periods. It can also happen if you have bleeding in your stomach or bowel. You can also get anemia if you don't have enough iron in your diet or if it's hard for your body to absorb iron. In some cases, pregnancy causes anemia. That's because a pregnant woman needs more iron. Your doctor may do more tests to find the cause of your anemia. If a disease or other health problem is causing it, your doctor will treat that problem. It's important to follow up with your doctor to make sure that your iron level returns to normal. 
Follow-up care is a key part of your treatment and safety. Be sure to make and go to all appointments, and call your doctor if you are having problems. It's also a good idea to know your test results and keep a list of the medicines you take. How can you care for yourself at home? · If your doctor recommended iron pills, take them as directed. ¨ Try to take the pills on an empty stomach. You can do this about 1 hour before or 2 hours after meals. But you may need to take iron with food to avoid an upset stomach. ¨ Do not take antacids or drink milk or anything with caffeine within 2 hours of when you take your iron. They can keep your body from absorbing the iron well. ¨ Vitamin C helps your body absorb iron. You may want to take iron pills with a glass of orange juice or some other food high in vitamin C. 
¨ Iron pills may cause stomach problems. These include heartburn, nausea, diarrhea, constipation, and cramps. It can help to drink plenty of fluids and include fruits, vegetables, and fiber in your diet. ¨ It's normal for iron pills to make your stool a greenish or grayish black. But internal bleeding can also cause dark stool. So it's important to tell your doctor about any color changes. ¨ Call your doctor if you think you are having a problem with your iron pills. Even after you start to feel better, it will take several months for your body to build up its supply of iron. ¨ If you miss a pill, don't take a double dose. ¨ Keep iron pills out of the reach of small children. Too much iron can be very dangerous. · Eat foods with a lot of iron. These include red meat, shellfish, poultry, and eggs. They also include beans, raisins, whole-grain bread, and leafy green vegetables. · Steam your vegetables. This is the best way to prepare them if you want to get as much iron as possible. · Be safe with medicines. Do not take nonsteroidal anti-inflammatory pain relievers unless your doctor tells you to. These include aspirin, naproxen (Aleve), and ibuprofen (Advil, Motrin). · Liquid iron can stain your teeth. But you can mix it with water or juice and drink it with a straw. Then it won't get on your teeth. When should you call for help? Call 911 anytime you think you may need emergency care. For example, call if: 
? · You passed out (lost consciousness). ?Call your doctor now or seek immediate medical care if: 
? · You are short of breath. ? · You are dizzy or light-headed, or you feel like you may faint. ? · You have new or worse bleeding. ? Watch closely for changes in your health, and be sure to contact your doctor if: ? · You feel weaker or more tired than usual.  
? · You do not get better as expected. Where can you learn more? Go to http://ashlie-funmilayo.info/. Enter J735 in the search box to learn more about \"Iron Deficiency Anemia: Care Instructions. \" Current as of: October 13, 2016 Content Version: 11.4 © 1025-2470 wmbly. Care instructions adapted under license by [employer] (which disclaims liability or warranty for this information). If you have questions about a medical condition or this instruction, always ask your healthcare professional. [place] [address] any warranty or liability for your use of this information. Breast Cancer: Care Instructions Your Care Instructions Breast cancer occurs when abnormal cells grow out of control in the breast. These cancer cells can spread within the breast, to nearby lymph nodes and other tissues, and to other parts of the body. Being treated for cancer can weaken your body, and you may feel very tired. Get the rest your body needs so you can feel better. Finding out that you have cancer is scary. You may feel many emotions and may need some help coping. Seek out family, friends, and counselors for support. You also can do things at home to make yourself feel better while you go through treatment. Call the [employer] (0-954.889.5579) or visit its website at [address] [employer] for more information. Follow-up care is a key part of your treatment and safety. Be sure to make and go to all appointments, and call your doctor if you are having problems. It's also a good idea to know your test results and keep a list of the medicines you take. How can you care for yourself at home? · Take your medicines exactly as prescribed. Call your doctor if you think you are having a problem with your medicine. You may get medicine for nausea and vomiting if you have these side effects. · Follow your doctor's instructions to relieve pain. Pain from cancer and surgery can almost always be controlled. Use pain medicine when you first notice pain, before it becomes severe. · Eat healthy food. If you do not feel like eating, try to eat food that has protein and extra calories to keep up your strength and prevent weight loss. Drink liquid meal replacements for extra calories and protein. Try to eat your main meal early. · Get some physical activity every day, but do not get too tired. Keep doing the hobbies you enjoy as your energy allows. · Do not smoke. Smoking can make your cancer worse. If you need help quitting, talk to your doctor about stop-smoking programs and medicines. These can increase your chances of quitting for good. · Take steps to control your stress and workload. Learn relaxation techniques. ¨ Share your feelings. Stress and tension affect our emotions. By expressing your feelings to others, you may be able to understand and cope with them. ¨ Consider joining a support group. Talking about a problem with your spouse, a good friend, or other people with similar problems is a good way to reduce tension and stress. ¨ Express yourself through art. Try writing, crafts, dance, or art to relieve stress. Some dance, writing, or art groups may be available just for people who have cancer. ¨ Be kind to your body and mind. Getting enough sleep, eating a healthy diet, and taking time to do things you enjoy can contribute to an overall feeling of balance in your life and can help reduce stress. ¨ Get help if you need it. Discuss your concerns with your doctor or counselor. · If you are vomiting or have diarrhea: ¨ Drink plenty of fluids (enough so that your urine is light yellow or clear like water) to prevent dehydration. Choose water and other caffeine-free clear liquids.  If you have kidney, heart, or liver disease and have to limit fluids, talk with your doctor before you increase the amount of fluids you drink. ¨ When you are able to eat, try clear soups, mild foods, and liquids until all symptoms are gone for 12 to 48 hours. Other good choices include dry toast, crackers, cooked cereal, and gelatin dessert, such as Jell-O. · If you have not already done so, prepare a list of advance directives. Advance directives are instructions to your doctor and family members about what kind of care you want if you become unable to speak or express yourself. When should you call for help? Call 911 anytime you think you may need emergency care. For example, call if: 
? · You passed out (lost consciousness). ?Call your doctor now or seek immediate medical care if: 
? · You have a fever. ? · You have abnormal bleeding. ? · You think you have an infection. ? · You have new or worse pain. ? · You have new symptoms, such as a cough, belly pain, vomiting, diarrhea, or a rash. ? Watch closely for changes in your health, and be sure to contact your doctor if: 
? · You are much more tired than usual.  
? · You have swollen glands in your armpits, groin, or neck. ? · You do not get better as expected. Where can you learn more? Go to http://ashlie-funmilayo.info/. Enter V321 in the search box to learn more about \"Breast Cancer: Care Instructions. \" Current as of: May 12, 2017 Content Version: 11.4 © 0205-5790 [employer]. Care instructions adapted under license by [employer] (which disclaims liability or warranty for this information). If you have questions about a medical condition or this instruction, always ask your healthcare professional. [place] [address] any warranty or liability for your use of this information. Please provide this summary of care documentation to your next provider. Your primary care clinician is listed as Alannah Ac. If you have any questions after today's visit, please call [phone].

## 2017-12-04 NOTE — PATIENT INSTRUCTIONS
Iron Deficiency Anemia: Care Instructions  Your Care Instructions    Anemia means that you do not have enough red blood cells. Red blood cells carry oxygen around your body. When you have anemia, it can make you pale, weak, and tired. Many things can cause anemia. The most common cause is loss of blood. This can happen if you have heavy menstrual periods. It can also happen if you have bleeding in your stomach or bowel. You can also get anemia if you don't have enough iron in your diet or if it's hard for your body to absorb iron. In some cases, pregnancy causes anemia. That's because a pregnant woman needs more iron. Your doctor may do more tests to find the cause of your anemia. If a disease or other health problem is causing it, your doctor will treat that problem. It's important to follow up with your doctor to make sure that your iron level returns to normal.  Follow-up care is a key part of your treatment and safety. Be sure to make and go to all appointments, and call your doctor if you are having problems. It's also a good idea to know your test results and keep a list of the medicines you take. How can you care for yourself at home? · If your doctor recommended iron pills, take them as directed. ¨ Try to take the pills on an empty stomach. You can do this about 1 hour before or 2 hours after meals. But you may need to take iron with food to avoid an upset stomach. ¨ Do not take antacids or drink milk or anything with caffeine within 2 hours of when you take your iron. They can keep your body from absorbing the iron well. ¨ Vitamin C helps your body absorb iron. You may want to take iron pills with a glass of orange juice or some other food high in vitamin C.  ¨ Iron pills may cause stomach problems. These include heartburn, nausea, diarrhea, constipation, and cramps. It can help to drink plenty of fluids and include fruits, vegetables, and fiber in your diet.   ¨ It's normal for iron pills to make your stool a greenish or grayish black. But internal bleeding can also cause dark stool. So it's important to tell your doctor about any color changes. ¨ Call your doctor if you think you are having a problem with your iron pills. Even after you start to feel better, it will take several months for your body to build up its supply of iron. ¨ If you miss a pill, don't take a double dose. ¨ Keep iron pills out of the reach of small children. Too much iron can be very dangerous. · Eat foods with a lot of iron. These include red meat, shellfish, poultry, and eggs. They also include beans, raisins, whole-grain bread, and leafy green vegetables. · Steam your vegetables. This is the best way to prepare them if you want to get as much iron as possible. · Be safe with medicines. Do not take nonsteroidal anti-inflammatory pain relievers unless your doctor tells you to. These include aspirin, naproxen (Aleve), and ibuprofen (Advil, Motrin). · Liquid iron can stain your teeth. But you can mix it with water or juice and drink it with a straw. Then it won't get on your teeth. When should you call for help? Call 911 anytime you think you may need emergency care. For example, call if:  ? · You passed out (lost consciousness). ?Call your doctor now or seek immediate medical care if:  ? · You are short of breath. ? · You are dizzy or light-headed, or you feel like you may faint. ? · You have new or worse bleeding. ? Watch closely for changes in your health, and be sure to contact your doctor if:  ? · You feel weaker or more tired than usual.   ? · You do not get better as expected. Where can you learn more? Go to http://ashlie-funmilayo.info/. Enter L529 in the search box to learn more about \"Iron Deficiency Anemia: Care Instructions. \"  Current as of: October 13, 2016  Content Version: 11.4  © 5896-2471 [employer], [employer].  Care instructions adapted under license by [employer] (which disclaims liability or warranty for this information). If you have questions about a medical condition or this instruction, always ask your healthcare professional. [address] any warranty or liability for your use of this information. Breast Cancer: Care Instructions  Your Care Instructions    Breast cancer occurs when abnormal cells grow out of control in the breast. These cancer cells can spread within the breast, to nearby lymph nodes and other tissues, and to other parts of the body. Being treated for cancer can weaken your body, and you may feel very tired. Get the rest your body needs so you can feel better. Finding out that you have cancer is scary. You may feel many emotions and may need some help coping. Seek out family, friends, and counselors for support. You also can do things at home to make yourself feel better while you go through treatment. Call the [employer] (9-533.797.1749) or visit its website at [address] for more information. Follow-up care is a key part of your treatment and safety. Be sure to make and go to all appointments, and call your doctor if you are having problems. It's also a good idea to know your test results and keep a list of the medicines you take. How can you care for yourself at home? · Take your medicines exactly as prescribed. Call your doctor if you think you are having a problem with your medicine. You may get medicine for nausea and vomiting if you have these side effects. · Follow your doctor's instructions to relieve pain. Pain from cancer and surgery can almost always be controlled. Use pain medicine when you first notice pain, before it becomes severe. · Eat healthy food. If you do not feel like eating, try to eat food that has protein and extra calories to keep up your strength and prevent weight loss. Drink liquid meal replacements for extra calories and protein. Try to eat your main meal early.   · Get some physical activity every day, but do not get too tired. Keep doing the hobbies you enjoy as your energy allows. · Do not smoke. Smoking can make your cancer worse. If you need help quitting, talk to your doctor about stop-smoking programs and medicines. These can increase your chances of quitting for good. · Take steps to control your stress and workload. Learn relaxation techniques. ¨ Share your feelings. Stress and tension affect our emotions. By expressing your feelings to others, you may be able to understand and cope with them. ¨ Consider joining a support group. Talking about a problem with your spouse, a good friend, or other people with similar problems is a good way to reduce tension and stress. ¨ Express yourself through art. Try writing, crafts, dance, or art to relieve stress. Some dance, writing, or art groups may be available just for people who have cancer. ¨ Be kind to your body and mind. Getting enough sleep, eating a healthy diet, and taking time to do things you enjoy can contribute to an overall feeling of balance in your life and can help reduce stress. ¨ Get help if you need it. Discuss your concerns with your doctor or counselor. · If you are vomiting or have diarrhea:  ¨ Drink plenty of fluids (enough so that your urine is light yellow or clear like water) to prevent dehydration. Choose water and other caffeine-free clear liquids. If you have kidney, heart, or liver disease and have to limit fluids, talk with your doctor before you increase the amount of fluids you drink. ¨ When you are able to eat, try clear soups, mild foods, and liquids until all symptoms are gone for 12 to 48 hours. Other good choices include dry toast, crackers, cooked cereal, and gelatin dessert, such as Jell-O.  · If you have not already done so, prepare a list of advance directives.  Advance directives are instructions to your doctor and family members about what kind of care you want if you become unable to speak or express yourself. When should you call for help? Call 911 anytime you think you may need emergency care. For example, call if:  ? · You passed out (lost consciousness). ?Call your doctor now or seek immediate medical care if:  ? · You have a fever. ? · You have abnormal bleeding. ? · You think you have an infection. ? · You have new or worse pain. ? · You have new symptoms, such as a cough, belly pain, vomiting, diarrhea, or a rash. ? Watch closely for changes in your health, and be sure to contact your doctor if:  ? · You are much more tired than usual.   ? · You have swollen glands in your armpits, groin, or neck. ? · You do not get better as expected. Where can you learn more? Go to http://ashlie-funmilayo.info/. Enter V321 in the search box to learn more about \"Breast Cancer: Care Instructions. \"  Current as of: May 12, 2017  Content Version: 11.4  © 2176-4539 [employer]. Care instructions adapted under license by [employer] (which disclaims liability or warranty for this information). If you have questions about a medical condition or this instruction, always ask your healthcare professional. [address] any warranty or liability for your use of this information.

## 2017-12-05 LAB
ALBUMIN SERPL-MCNC: 4.7 G/DL (ref 3.6–4.8)
ALBUMIN/GLOB SERPL: 1.7 {RATIO} (ref 1.2–2.2)
ALP SERPL-CCNC: 82 IU/L (ref 39–117)
ALT SERPL-CCNC: 11 IU/L (ref 0–32)
AST SERPL-CCNC: 13 IU/L (ref 0–40)
BILIRUB SERPL-MCNC: 0.3 MG/DL (ref 0–1.2)
BUN SERPL-MCNC: 20 MG/DL (ref 8–27)
BUN/CREAT SERPL: 19 (ref 12–28)
CALCIUM SERPL-MCNC: 10 MG/DL (ref 8.7–10.3)
CANCER AG27-29 SERPL-ACNC: 8.1 U/ML (ref 0–38.6)
CHLORIDE SERPL-SCNC: 98 MMOL/L (ref 96–106)
CO2 SERPL-SCNC: 27 MMOL/L (ref 18–29)
CREAT SERPL-MCNC: 1.03 MG/DL (ref 0.57–1)
FERRITIN SERPL-MCNC: 118 NG/ML (ref 15–150)
GFR SERPLBLD CREATININE-BSD FMLA CKD-EPI: 58 ML/MIN/1.73
GFR SERPLBLD CREATININE-BSD FMLA CKD-EPI: 67 ML/MIN/1.73
GLOBULIN SER CALC-MCNC: 2.7 G/DL (ref 1.5–4.5)
GLUCOSE SERPL-MCNC: 97 MG/DL (ref 65–99)
IRON SATN MFR SERPL: 15 % (ref 15–55)
IRON SERPL-MCNC: 51 UG/DL (ref 27–139)
POTASSIUM SERPL-SCNC: 4.2 MMOL/L (ref 3.5–5.2)
PROT SERPL-MCNC: 7.4 G/DL (ref 6–8.5)
SODIUM SERPL-SCNC: 140 MMOL/L (ref 134–144)
TIBC SERPL-MCNC: 340 UG/DL (ref 250–450)
UIBC SERPL-MCNC: 289 UG/DL (ref 118–369)

## 2018-03-05 ENCOUNTER — OFFICE VISIT (OUTPATIENT)
Dept: ONCOLOGY | Age: 63
End: 2018-03-05

## 2018-03-05 VITALS
HEART RATE: 59 BPM | WEIGHT: 194 LBS | TEMPERATURE: 97.8 F | SYSTOLIC BLOOD PRESSURE: 120 MMHG | DIASTOLIC BLOOD PRESSURE: 62 MMHG | BODY MASS INDEX: 28.65 KG/M2

## 2018-03-05 DIAGNOSIS — C50.212 MALIGNANT NEOPLASM OF UPPER-INNER QUADRANT OF LEFT FEMALE BREAST, UNSPECIFIED ESTROGEN RECEPTOR STATUS (HCC): Primary | ICD-10-CM

## 2018-03-05 DIAGNOSIS — D50.8 IRON DEFICIENCY ANEMIA SECONDARY TO INADEQUATE DIETARY IRON INTAKE: ICD-10-CM

## 2018-03-05 NOTE — PROGRESS NOTES
Hematology/Oncology  Progress Note    Name: Lars Mueller  Date: 2018  : 1955    PCP: Sissy Rodriguez MD     Ms. Pinky Antony is a 58 y.o. female who was seen for management of her invasive ductal adenocarcinoma of the left breast.    Current therapy: Femara 2.5mg PO daily. Subjective:     Mrs. Pinky Antony is a 59-year-old woman who has a history of invasive ductal adenocarcinoma involving the left breast.  She is doing well. She continues to take Femara 2.5mg PO daily and is tolerating the treatment reasonably well. She has no new complaints or concerns to report. Past medical history, family history, and social history: these were reviewed and remains unchanged. No past medical history on file. Past Surgical History:   Procedure Laterality Date    BREAST SURGERY PROCEDURE UNLISTED      HX HYSTERECTOMY       Social History     Social History    Marital status: UNKNOWN     Spouse name: N/A    Number of children: N/A    Years of education: N/A     Occupational History    Not on file. Social History Main Topics    Smoking status: Never Smoker    Smokeless tobacco: Not on file    Alcohol use No    Drug use: Not on file    Sexual activity: Not on file     Other Topics Concern    Not on file     Social History Narrative     Family History   Problem Relation Age of Onset    Heart Disease Mother     Cancer Mother      colon    Diabetes Father     Cancer Brother      prostate    Alzheimer Paternal Uncle     Other Maternal Grandfather      old age   Sarthak Rashid Cancer Brother      colon    Alzheimer Cousin      Current Outpatient Prescriptions   Medication Sig Dispense Refill    letrozole (FEMARA) 2.5 mg tablet Take 1 Tab by mouth daily. 90 Tab 3       Review of Systems  Constitutional: The patient has no acute distress or discomfort.   HEENT: The patient denies recent head trauma, eye pain, blurred vision,  hearing deficit, oropharyngeal mucosal pain or lesions, and the patient denies throat pain or discomfort. Lymphatics: The patient denies palpable peripheral lymphadenopathy. Hematologic: The patient denies having bruising, bleeding, or progressive fatigue. Respiratory: Patient denies having shortness of breath, cough, sputum production, fever, or dyspnea on exertion. Cardiovascular: The patient denies having leg pain, leg swelling, heart palpitations, chest permit, chest pain, or lightheadedness. The patient denies having dyspnea on exertion. Gastrointestinal: The patient denies having nausea, emesis, or diarrhea. The patient denies having any hematemesis or blood in the stool. Genitourinary: Patient denies having urinary urgency, frequency, or dysuria. The patient denies having blood in the urine. Psychological: The patient denies having symptoms of nervousness, anxiety, depression, or thoughts of harming self. Skin: Patient denies having skin rashes, skin, ulcerations, or unexplained itching or pruritus. Musculoskeletal: The patient denies having pain in the joints or bones. Objective:     Visit Vitals    /62    Pulse (!) 59    Temp 97.8 °F (36.6 °C) (Oral)    Wt 88 kg (194 lb)    BMI 28.65 kg/m2     ECOG PS=0; Pain score=0/10  Physical Exam:   Gen. Appearance: The patient is in no acute distress. Skin: There is no bruise or rash. HEENT: The exam is unremarkable. Neck: Supple without lymphadenopathy or thyromegaly. Lungs: Clear to auscultation and percussion; there are no wheezes or rhonchi. Heart: Regular rate and rhythm; there are no murmurs, gallops, or rubs. Abdomen: Bowel sounds are present and normal.  There is no guarding, tenderness, or hepatosplenomegaly. Extremities: There is no clubbing, cyanosis, or edema. Neurologic: There are no focal neurologic deficits. Lymphatics: There is no palpable peripheral lymphadenopathy. Musculoskeletal: The patient has full range of motion at all joints. There is no evidence of joint deformity or effusions.   There is no focal joint tenderness. Psychological/psychiatric: There is no clinical evidence of anxiety, depression, or melancholy. Lab data:      Results for orders placed or performed during the hospital encounter of 12/04/17   CBC WITH 3 PART DIFF     Status: Abnormal   Result Value Ref Range Status    WBC 6.2 4.5 - 13.0 K/uL Final    RBC 3.74 (L) 4.10 - 5.10 M/uL Final    HGB 11.6 (L) 12.0 - 16 g/dL Final    HCT 34.3 (L) 36 - 48 % Final    MCV 91.7 78 - 102 FL Final    MCH 31.0 25.0 - 35.0 PG Final    MCHC 33.8 31 - 37 g/dL Final    RDW 13.3 11.5 - 14.5 % Final    NEUTROPHILS 77 (H) 40 - 70 % Final    MIXED CELLS 10 0.1 - 17 % Final    LYMPHOCYTES 14 14 - 44 % Final    ABS. NEUTROPHILS 4.8 1.8 - 9.5 K/UL Final    ABS. MIXED CELLS 0.6 0.0 - 2.3 K/uL Final    ABS. LYMPHOCYTES 0.8 (L) 1.1 - 5.9 K/UL Final     Comment: Test performed at [place] [address] Location. Results Reviewed by Medical Director. DF AUTOMATED   Final           Assessment:     1. Malignant neoplasm of upper-inner quadrant of left female breast, unspecified estrogen receptor status ([facility] [address]    2. Iron deficiency anemia secondary to inadequate dietary iron intake      Plan:   Invasive ductal adenocarcinoma left breast: I have encouraged the patient to continue with her self breast exams. She will also continue with annual screening mammography. She is schedule for Mammogram on 3/16/2018 Femara 2.5 mg daily will be continued. Her CA 27-29 on 12/05/2017 was normal at 8.1. At this time I will check her CA-27-29 level. Functional iron deficiency anemia: I have explained to the patient that her CBC is pending. The most recent CBC on 12/05/2017 showed that her hemoglobin is 11.8 g/dL with hematocrit 35%. She was encouraged to continue taking the ferrous sulfate 1 tablet daily. I will check iron profile and ferritin levels at this time. Follow-up will occur in 3 months or sooner if indicated.     Orders Placed This Encounter    CBC WITH AUTOMATED DIFF     Standing Status:   Future     Number of Occurrences:   1     Standing Expiration Date:   6/1/0890    METABOLIC PANEL, COMPREHENSIVE     Standing Status:   Future     Number of Occurrences:   1     Standing Expiration Date:   3/6/2019    IRON PROFILE     Standing Status:   Future     Number of Occurrences:   1     Standing Expiration Date:   3/6/2019    FERRITIN     Standing Status:   Future     Number of Occurrences:   1     Standing Expiration Date:   3/6/2019    CA 27.29     Standing Status:   Future     Number of Occurrences:   1     Standing Expiration Date:   3/6/2019    CBC WITH AUTOMATED DIFF    METABOLIC PANEL, COMPREHENSIVE    IRON PROFILE    CA 27.29    FERRITIN       Rama Walls MD  03/5/2018

## 2018-03-05 NOTE — PATIENT INSTRUCTIONS
Iron Deficiency Anemia: Care Instructions  Your Care Instructions    Anemia means that you do not have enough red blood cells. Red blood cells carry oxygen around your body. When you have anemia, it can make you pale, weak, and tired. Many things can cause anemia. The most common cause is loss of blood. This can happen if you have heavy menstrual periods. It can also happen if you have bleeding in your stomach or bowel. You can also get anemia if you don't have enough iron in your diet or if it's hard for your body to absorb iron. In some cases, pregnancy causes anemia. That's because a pregnant woman needs more iron. Your doctor may do more tests to find the cause of your anemia. If a disease or other health problem is causing it, your doctor will treat that problem. It's important to follow up with your doctor to make sure that your iron level returns to normal.  Follow-up care is a key part of your treatment and safety. Be sure to make and go to all appointments, and call your doctor if you are having problems. It's also a good idea to know your test results and keep a list of the medicines you take. How can you care for yourself at home? · If your doctor recommended iron pills, take them as directed. ¨ Try to take the pills on an empty stomach. You can do this about 1 hour before or 2 hours after meals. But you may need to take iron with food to avoid an upset stomach. ¨ Do not take antacids or drink milk or anything with caffeine within 2 hours of when you take your iron. They can keep your body from absorbing the iron well. ¨ Vitamin C helps your body absorb iron. You may want to take iron pills with a glass of orange juice or some other food high in vitamin C.  ¨ Iron pills may cause stomach problems. These include heartburn, nausea, diarrhea, constipation, and cramps. It can help to drink plenty of fluids and include fruits, vegetables, and fiber in your diet.   ¨ It's normal for iron pills to make your stool a greenish or grayish black. But internal bleeding can also cause dark stool. So it's important to tell your doctor about any color changes. ¨ Call your doctor if you think you are having a problem with your iron pills. Even after you start to feel better, it will take several months for your body to build up its supply of iron. ¨ If you miss a pill, don't take a double dose. ¨ Keep iron pills out of the reach of small children. Too much iron can be very dangerous. · Eat foods with a lot of iron. These include red meat, shellfish, poultry, and eggs. They also include beans, raisins, whole-grain bread, and leafy green vegetables. · Steam your vegetables. This is the best way to prepare them if you want to get as much iron as possible. · Be safe with medicines. Do not take nonsteroidal anti-inflammatory pain relievers unless your doctor tells you to. These include aspirin, naproxen (Aleve), and ibuprofen (Advil, Motrin). · Liquid iron can stain your teeth. But you can mix it with water or juice and drink it with a straw. Then it won't get on your teeth. When should you call for help? Call 911 anytime you think you may need emergency care. For example, call if:  ? · You passed out (lost consciousness). ?Call your doctor now or seek immediate medical care if:  ? · You are short of breath. ? · You are dizzy or light-headed, or you feel like you may faint. ? · You have new or worse bleeding. ? Watch closely for changes in your health, and be sure to contact your doctor if:  ? · You feel weaker or more tired than usual.   ? · You do not get better as expected. Where can you learn more? Go to http://ashlie-funmilayo.info/. Enter Z527 in the search box to learn more about \"Iron Deficiency Anemia: Care Instructions. \"  Current as of: October 13, 2016  Content Version: 11.4  © 9998-4645 [employer], [employer].  Care instructions adapted under license by [employer] (which disclaims liability or warranty for this information). If you have questions about a medical condition or this instruction, always ask your healthcare professional. [address] any warranty or liability for your use of this information. Breast Cancer: Care Instructions  Your Care Instructions    Breast cancer occurs when abnormal cells grow out of control in the breast. These cancer cells can spread within the breast, to nearby lymph nodes and other tissues, and to other parts of the body. Being treated for cancer can weaken your body, and you may feel very tired. Get the rest your body needs so you can feel better. Finding out that you have cancer is scary. You may feel many emotions and may need some help coping. Seek out family, friends, and counselors for support. You also can do things at home to make yourself feel better while you go through treatment. Call the [employer] (4-119.312.1162) or visit its website at [address] for more information. Follow-up care is a key part of your treatment and safety. Be sure to make and go to all appointments, and call your doctor if you are having problems. It's also a good idea to know your test results and keep a list of the medicines you take. How can you care for yourself at home? · Take your medicines exactly as prescribed. Call your doctor if you think you are having a problem with your medicine. You may get medicine for nausea and vomiting if you have these side effects. · Follow your doctor's instructions to relieve pain. Pain from cancer and surgery can almost always be controlled. Use pain medicine when you first notice pain, before it becomes severe. · Eat healthy food. If you do not feel like eating, try to eat food that has protein and extra calories to keep up your strength and prevent weight loss. Drink liquid meal replacements for extra calories and protein. Try to eat your main meal early.   · Get some physical activity every day, but do not get too tired. Keep doing the hobbies you enjoy as your energy allows. · Do not smoke. Smoking can make your cancer worse. If you need help quitting, talk to your doctor about stop-smoking programs and medicines. These can increase your chances of quitting for good. · Take steps to control your stress and workload. Learn relaxation techniques. ¨ Share your feelings. Stress and tension affect our emotions. By expressing your feelings to others, you may be able to understand and cope with them. ¨ Consider joining a support group. Talking about a problem with your spouse, a good friend, or other people with similar problems is a good way to reduce tension and stress. ¨ Express yourself through art. Try writing, crafts, dance, or art to relieve stress. Some dance, writing, or art groups may be available just for people who have cancer. ¨ Be kind to your body and mind. Getting enough sleep, eating a healthy diet, and taking time to do things you enjoy can contribute to an overall feeling of balance in your life and can help reduce stress. ¨ Get help if you need it. Discuss your concerns with your doctor or counselor. · If you are vomiting or have diarrhea:  ¨ Drink plenty of fluids (enough so that your urine is light yellow or clear like water) to prevent dehydration. Choose water and other caffeine-free clear liquids. If you have kidney, heart, or liver disease and have to limit fluids, talk with your doctor before you increase the amount of fluids you drink. ¨ When you are able to eat, try clear soups, mild foods, and liquids until all symptoms are gone for 12 to 48 hours. Other good choices include dry toast, crackers, cooked cereal, and gelatin dessert, such as Jell-O.  · If you have not already done so, prepare a list of advance directives.  Advance directives are instructions to your doctor and family members about what kind of care you want if you become unable to speak or express yourself. When should you call for help? Call 911 anytime you think you may need emergency care. For example, call if:  ? · You passed out (lost consciousness). ?Call your doctor now or seek immediate medical care if:  ? · You have a fever. ? · You have abnormal bleeding. ? · You think you have an infection. ? · You have new or worse pain. ? · You have new symptoms, such as a cough, belly pain, vomiting, diarrhea, or a rash. ? Watch closely for changes in your health, and be sure to contact your doctor if:  ? · You are much more tired than usual.   ? · You have swollen glands in your armpits, groin, or neck. ? · You do not get better as expected. Where can you learn more? Go to http://ashlie-funmilayo.info/. Enter V321 in the search box to learn more about \"Breast Cancer: Care Instructions. \"  Current as of: May 12, 2017  Content Version: 11.4  © 7207-5475 [employer]. Care instructions adapted under license by [employer] (which disclaims liability or warranty for this information). If you have questions about a medical condition or this instruction, always ask your healthcare professional. [address] any warranty or liability for your use of this information.

## 2018-03-06 LAB
ALBUMIN SERPL-MCNC: 4.7 G/DL (ref 3.6–4.8)
ALBUMIN/GLOB SERPL: 1.7 {RATIO} (ref 1.2–2.2)
ALP SERPL-CCNC: 80 IU/L (ref 39–117)
ALT SERPL-CCNC: 9 IU/L (ref 0–32)
AST SERPL-CCNC: 14 IU/L (ref 0–40)
BASOPHILS # BLD AUTO: 0 X10E3/UL (ref 0–0.2)
BASOPHILS NFR BLD AUTO: 0 %
BILIRUB SERPL-MCNC: 0.4 MG/DL (ref 0–1.2)
BUN SERPL-MCNC: 20 MG/DL (ref 8–27)
BUN/CREAT SERPL: 21 (ref 12–28)
CALCIUM SERPL-MCNC: 10 MG/DL (ref 8.7–10.3)
CANCER AG27-29 SERPL-ACNC: 16.5 U/ML (ref 0–38.6)
CHLORIDE SERPL-SCNC: 96 MMOL/L (ref 96–106)
CO2 SERPL-SCNC: 27 MMOL/L (ref 18–29)
CREAT SERPL-MCNC: 0.95 MG/DL (ref 0.57–1)
EOSINOPHIL # BLD AUTO: 0.1 X10E3/UL (ref 0–0.4)
EOSINOPHIL NFR BLD AUTO: 1 %
ERYTHROCYTE [DISTWIDTH] IN BLOOD BY AUTOMATED COUNT: 14.8 % (ref 12.3–15.4)
FERRITIN SERPL-MCNC: 149 NG/ML (ref 15–150)
GFR SERPLBLD CREATININE-BSD FMLA CKD-EPI: 63 ML/MIN/1.73
GFR SERPLBLD CREATININE-BSD FMLA CKD-EPI: 73 ML/MIN/1.73
GLOBULIN SER CALC-MCNC: 2.7 G/DL (ref 1.5–4.5)
GLUCOSE SERPL-MCNC: 84 MG/DL (ref 65–99)
HCT VFR BLD AUTO: 32.8 % (ref 34–46.6)
HGB BLD-MCNC: 11.1 G/DL (ref 11.1–15.9)
IMM GRANULOCYTES # BLD: 0 X10E3/UL (ref 0–0.1)
IMM GRANULOCYTES NFR BLD: 0 %
IRON SATN MFR SERPL: 19 % (ref 15–55)
IRON SERPL-MCNC: 59 UG/DL (ref 27–139)
LYMPHOCYTES # BLD AUTO: 1.4 X10E3/UL (ref 0.7–3.1)
LYMPHOCYTES NFR BLD AUTO: 27 %
MCH RBC QN AUTO: 29.8 PG (ref 26.6–33)
MCHC RBC AUTO-ENTMCNC: 33.8 G/DL (ref 31.5–35.7)
MCV RBC AUTO: 88 FL (ref 79–97)
MONOCYTES # BLD AUTO: 0.3 X10E3/UL (ref 0.1–0.9)
MONOCYTES NFR BLD AUTO: 6 %
NEUTROPHILS # BLD AUTO: 3.4 X10E3/UL (ref 1.4–7)
NEUTROPHILS NFR BLD AUTO: 66 %
PLATELET # BLD AUTO: 226 X10E3/UL (ref 150–379)
POTASSIUM SERPL-SCNC: 4.1 MMOL/L (ref 3.5–5.2)
PROT SERPL-MCNC: 7.4 G/DL (ref 6–8.5)
RBC # BLD AUTO: 3.72 X10E6/UL (ref 3.77–5.28)
SODIUM SERPL-SCNC: 137 MMOL/L (ref 134–144)
TIBC SERPL-MCNC: 318 UG/DL (ref 250–450)
UIBC SERPL-MCNC: 259 UG/DL (ref 118–369)
WBC # BLD AUTO: 5.1 X10E3/UL (ref 3.4–10.8)

## 2018-04-05 RX ORDER — LETROZOLE 2.5 MG/1
TABLET, FILM COATED ORAL
Qty: 90 TAB | Refills: 3 | Status: SHIPPED | OUTPATIENT
Start: 2018-04-05 | End: 2019-04-04 | Stop reason: SDUPTHER

## 2018-06-04 ENCOUNTER — HOSPITAL ENCOUNTER (OUTPATIENT)
Dept: ONCOLOGY | Age: 63
Discharge: HOME | End: 2018-06-04

## 2018-06-04 ENCOUNTER — OFFICE VISIT (OUTPATIENT)
Dept: ONCOLOGY | Age: 63
End: 2018-06-04

## 2018-06-04 VITALS — DIASTOLIC BLOOD PRESSURE: 68 MMHG | HEART RATE: 50 BPM | TEMPERATURE: 97.1 F | SYSTOLIC BLOOD PRESSURE: 128 MMHG

## 2018-06-04 DIAGNOSIS — C50.212 MALIGNANT NEOPLASM OF UPPER-INNER QUADRANT OF LEFT FEMALE BREAST, UNSPECIFIED ESTROGEN RECEPTOR STATUS (HCC): Primary | ICD-10-CM

## 2018-06-04 DIAGNOSIS — D50.8 IRON DEFICIENCY ANEMIA SECONDARY TO INADEQUATE DIETARY IRON INTAKE: ICD-10-CM

## 2018-06-04 DIAGNOSIS — C50.212 MALIGNANT NEOPLASM OF UPPER-INNER QUADRANT OF LEFT FEMALE BREAST, UNSPECIFIED ESTROGEN RECEPTOR STATUS (HCC): ICD-10-CM

## 2018-06-04 LAB
BASO+EOS+MONOS # BLD AUTO: 0.4 K/UL (ref 0–2.3)
BASO+EOS+MONOS # BLD AUTO: 10 % (ref 0.1–17)
DIFFERENTIAL METHOD BLD: ABNORMAL
ERYTHROCYTE [DISTWIDTH] IN BLOOD BY AUTOMATED COUNT: 13.1 % (ref 11.5–14.5)
HCT VFR BLD AUTO: 32.1 % (ref 36–48)
HGB BLD-MCNC: 10.7 G/DL (ref 12–16)
LYMPHOCYTES # BLD: 1 K/UL (ref 1.1–5.9)
LYMPHOCYTES NFR BLD: 25 % (ref 14–44)
MCH RBC QN AUTO: 29.7 PG (ref 25–35)
MCHC RBC AUTO-ENTMCNC: 33.3 G/DL (ref 31–37)
MCV RBC AUTO: 89.2 FL (ref 78–102)
NEUTS SEG # BLD: 2.7 K/UL (ref 1.8–9.5)
NEUTS SEG NFR BLD: 65 % (ref 40–70)
PLATELET # BLD AUTO: 168 K/UL (ref 140–440)
RBC # BLD AUTO: 3.6 M/UL (ref 4.1–5.1)
WBC # BLD AUTO: 4.1 K/UL (ref 4.5–13)

## 2018-06-04 NOTE — MR AVS SNAPSHOT
[address] [address] [facility] 
[phone] Patient: Katy Rea MRN: [id number] DNB:[id number] Visit Information Date & Time Provider Department Dept. Phone Encounter #  
 6/4/2018 10:00 AM Marisel Guerra MD [facility] Medical Oncology [phone] [id number] Follow-up Instructions Return in about 3 months (around 9/4/2018). Your Appointments 9/10/2018 10:15 AM  
Office Visit with Marisel Guerra MD  
[address]  Oncology [facility]) Appt Note: 3 MO RET  
 [address] [facility], [address] [place] [address] 200 [facility] Upcoming Health Maintenance Date Due Hepatitis C Screening 1955 Pneumococcal 19-64 Highest Risk (1 of 3 - PCV13) 2/14/1973 DTaP/Tdap/Td series (1 - Tdap) 2/14/1975 PAP AKA CERVICAL CYTOLOGY 2/14/1975 FOBT Q 1 YEAR AGE 50-75 2/14/2004 ZOSTER VACCINE AGE 60> 12/14/2013 BREAST CANCER SCRN MAMMOGRAM 3/25/2018 Influenza Age 5 to Adult 8/1/2018 Allergies as of 6/4/2018  Review Complete On: 6/4/2018 By: Marisel Guerra MD  
 No Known Allergies Current Immunizations  Never Reviewed No immunizations on file. Not reviewed this visit You Were Diagnosed With   
  
 Codes Comments Malignant neoplasm of upper-inner quadrant of left female breast, unspecified estrogen receptor status ([facility] [address]    -  Primary ICD-10-CM: Q15.517 ICD-9-CM: 174.2 Iron deficiency anemia secondary to inadequate dietary iron intake     ICD-10-CM: D50.8 ICD-9-CM: 280.1 Vitals BP Pulse Temp Smoking Status 128/68 (!) 50 97.1 °F (36.2 °C) (Oral) Never Smoker Preferred Pharmacy Pharmacy Name Phone [address] [place] [address]. [phone] Your Updated Medication List  
  
   
This list is accurate as of 6/4/18 11:05 AM.  Always use your most recent med list.  
  
  
  
  
 letrozole 2.5 mg tablet Commonly known as:  [facility] TAKE ONE TABLET BY MOUTH ONCE DAILY We Performed the Following CA 27.29 [79727 CPT(R)] COMPLETE CBC & AUTO DIFF WBC [41196 CPT(R)] COMPLETE CBC & AUTO DIFF WBC [33557 CPT(R)] FERRITIN [81036 CPT(R)] IRON PROFILE N276505 CPT(R)] METABOLIC PANEL, COMPREHENSIVE [15997 CPT(R)] Follow-up Instructions Return in about 3 months (around 9/4/2018). To-Do List   
 06/04/2018 Lab:  CBC WITH 3 PART DIFF   
  
 06/04/2018 Lab:  CBC WITH 3 PART DIFF Introducing [facility]! Dear Nba Andres: Thank you for requesting a [employer] account. Our records indicate that you have previously registered for a [employer] account but its currently inactive. Please call our [employer] support line at [phone]. Additional Information If you have questions, please visit the Frequently Asked Questions section of the [employer] website at [email or web address] Remember, [employer] is NOT to be used for urgent needs. For medical emergencies, dial 911. Now available from your iPhone and Android! Please provide this summary of care documentation to your next provider. Your primary care clinician is listed as Naida Alfredo. If you have any questions after today's visit, please call [phone].

## 2018-06-04 NOTE — PROGRESS NOTES
Hematology/Oncology  Progress Note    Name: Fatuma Polk  Date: 2018  : 1955    PCP: Porsche Aponte MD     Ms. Cheryl Mullins is a 58 y.o. female who was seen for management of her invasive ductal adenocarcinoma of the left breast.    Current therapy: Femara 2.5mg PO daily. Subjective:     Mrs. Cheryl Mullins is a 53-year-old woman who has a history of invasive ductal adenocarcinoma involving the left breast.  She is doing well. She continues to take Femara 2.5mg PO daily and is tolerating the treatment reasonably well. She has no new complaints or concerns to report. Past medical history, family history, and social history: these were reviewed and remains unchanged. No past medical history on file. Past Surgical History:   Procedure Laterality Date    BREAST SURGERY PROCEDURE UNLISTED      HX HYSTERECTOMY       Social History     Social History    Marital status: UNKNOWN     Spouse name: N/A    Number of children: N/A    Years of education: N/A     Occupational History    Not on file. Social History Main Topics    Smoking status: Never Smoker    Smokeless tobacco: Not on file    Alcohol use No    Drug use: Not on file    Sexual activity: Not on file     Other Topics Concern    Not on file     Social History Narrative     Family History   Problem Relation Age of Onset    Heart Disease Mother     Cancer Mother      colon    Diabetes Father     Cancer Brother      prostate    Alzheimer Paternal Uncle     Other Maternal Grandfather      old age   [facility] Cancer Brother      colon    Alzheimer Cousin      Current Outpatient Prescriptions   Medication Sig Dispense Refill    letrozole (FEMARA) 2.5 mg tablet TAKE ONE TABLET BY MOUTH ONCE DAILY 90 Tab 3       Review of Systems  Constitutional: The patient has no acute distress or discomfort.   HEENT: The patient denies recent head trauma, eye pain, blurred vision,  hearing deficit, oropharyngeal mucosal pain or lesions, and the patient denies throat pain or discomfort. Lymphatics: The patient denies palpable peripheral lymphadenopathy. Hematologic: The patient denies having bruising, bleeding, or progressive fatigue. Respiratory: Patient denies having shortness of breath, cough, sputum production, fever, or dyspnea on exertion. Cardiovascular: The patient denies having leg pain, leg swelling, heart palpitations, chest permit, chest pain, or lightheadedness. The patient denies having dyspnea on exertion. Gastrointestinal: The patient denies having nausea, emesis, or diarrhea. The patient denies having any hematemesis or blood in the stool. Genitourinary: Patient denies having urinary urgency, frequency, or dysuria. The patient denies having blood in the urine. Psychological: The patient denies having symptoms of nervousness, anxiety, depression, or thoughts of harming self. Skin: Patient denies having skin rashes, skin, ulcerations, or unexplained itching or pruritus. Musculoskeletal: The patient denies having pain in the joints or bones. Objective:     Visit Vitals    /68    Pulse (!) 50    Temp 97.1 °F (36.2 °C) (Oral)     ECOG PS=0; Pain score=0/10  Physical Exam:   Gen. Appearance: The patient is in no acute distress. Skin: There is no bruise or rash. HEENT: The exam is unremarkable. Neck: Supple without lymphadenopathy or thyromegaly. Lungs: Clear to auscultation and percussion; there are no wheezes or rhonchi. Heart: Regular rate and rhythm; there are no murmurs, gallops, or rubs. Abdomen: Bowel sounds are present and normal.  There is no guarding, tenderness, or hepatosplenomegaly. Extremities: There is no clubbing, cyanosis, or edema. Neurologic: There are no focal neurologic deficits. Lymphatics: There is no palpable peripheral lymphadenopathy. Musculoskeletal: The patient has full range of motion at all joints. There is no evidence of joint deformity or effusions.   There is no focal joint tenderness. Psychological/psychiatric: There is no clinical evidence of anxiety, depression, or melancholy. Lab data:      Results for orders placed or performed during the hospital encounter of 06/04/18   CBC WITH 3 PART DIFF     Status: Abnormal   Result Value Ref Range Status    WBC 4.1 (L) 4.5 - 13.0 K/uL Final    RBC 3.60 (L) 4.10 - 5.10 M/uL Final    HGB 10.7 (L) 12.0 - 16 g/dL Final    HCT 32.1 (L) 36 - 48 % Final    MCV 89.2 78 - 102 FL Final    MCH 29.7 25.0 - 35.0 PG Final    MCHC 33.3 31 - 37 g/dL Final    RDW 13.1 11.5 - 14.5 % Final    PLATELET 990 401 - 337 K/uL Final    NEUTROPHILS 65 40 - 70 % Final    MIXED CELLS 10 0.1 - 17 % Final    LYMPHOCYTES 25 14 - 44 % Final    ABS. NEUTROPHILS 2.7 1.8 - 9.5 K/UL Final    ABS. MIXED CELLS 0.4 0.0 - 2.3 K/uL Final    ABS. LYMPHOCYTES 1.0 (L) 1.1 - 5.9 K/UL Final     Comment: Test performed at [place] [address] Location. Results Reviewed by Medical Director. DF AUTOMATED   Final           Assessment:     1. Malignant neoplasm of upper-inner quadrant of left female breast, unspecified estrogen receptor status ([facility] [address]    2. Iron deficiency anemia secondary to inadequate dietary iron intake      Plan:   Invasive ductal adenocarcinoma left breast: I have encouraged the patient to continue with her self breast exams. She will also continue with annual screening mammography. She completed her most recent screening  Mammogram on 3/16/2018 and there was no mammographic evidence of disease. Femara 2.5 mg daily will be continued. Her CA 27-29 on 3/5/2018 was normal at 16.5 U/mL. At this time I will check her CA-27-29 level. Functional iron deficiency anemia: I have explained to the patient that her CBC is pending. The most recent CBC on 6/4/2018 showed a hemoglobin of 10.7 g/dL with hematocrit 32.1%. I explained to the patient that her ferritin level from 3/8/2018 was normal at 149 ng/mL.   She has not been taking the iron supplement on a regular basis.  Her hemoglobin from 12/4/2017 was actually higher at 11.6 g/dL with hematocrit of 34.3. I advised her to begin taking 1 tablet of Slow Fe daily. I will check iron profile and ferritin levels at this time. Follow-up will occur in 3 months or sooner if indicated.     Orders Placed This Encounter    COMPLETE CBC & AUTO DIFF WBC    COMPLETE CBC & AUTO DIFF WBC    InHouse CBC ([employer])     Standing Status:   Future     Number of Occurrences:   1     Standing Expiration Date:   5/90/5325    METABOLIC PANEL, COMPREHENSIVE     Standing Status:   Future     Number of Occurrences:   1     Standing Expiration Date:   6/5/2019    IRON PROFILE     Standing Status:   Future     Number of Occurrences:   1     Standing Expiration Date:   6/5/2019    FERRITIN     Standing Status:   Future     Number of Occurrences:   1     Standing Expiration Date:   6/5/2019    CA 27.29     Standing Status:   Future     Number of Occurrences:   1     Standing Expiration Date:   6/5/2019    InHouse CBC ([employer])     Standing Status:   Future     Number of Occurrences:   1     Standing Expiration Date:   6/11/2018       Josesito Dias MD   6/4/2018

## 2018-06-05 LAB
ALBUMIN SERPL-MCNC: 4.3 G/DL (ref 3.6–4.8)
ALBUMIN/GLOB SERPL: 1.5 {RATIO} (ref 1.2–2.2)
ALP SERPL-CCNC: 88 IU/L (ref 39–117)
ALT SERPL-CCNC: 9 IU/L (ref 0–32)
AST SERPL-CCNC: 17 IU/L (ref 0–40)
BILIRUB SERPL-MCNC: 0.4 MG/DL (ref 0–1.2)
BUN SERPL-MCNC: 19 MG/DL (ref 8–27)
BUN/CREAT SERPL: 24 (ref 12–28)
CALCIUM SERPL-MCNC: 9.6 MG/DL (ref 8.7–10.3)
CANCER AG27-29 SERPL-ACNC: 11 U/ML (ref 0–38.6)
CHLORIDE SERPL-SCNC: 97 MMOL/L (ref 96–106)
CO2 SERPL-SCNC: 24 MMOL/L (ref 18–29)
CREAT SERPL-MCNC: 0.78 MG/DL (ref 0.57–1)
FERRITIN SERPL-MCNC: 165 NG/ML (ref 15–150)
GFR SERPLBLD CREATININE-BSD FMLA CKD-EPI: 81 ML/MIN/1.73
GFR SERPLBLD CREATININE-BSD FMLA CKD-EPI: 93 ML/MIN/1.73
GLOBULIN SER CALC-MCNC: 2.8 G/DL (ref 1.5–4.5)
GLUCOSE SERPL-MCNC: 95 MG/DL (ref 65–99)
IRON SATN MFR SERPL: 23 % (ref 15–55)
IRON SERPL-MCNC: 69 UG/DL (ref 27–139)
POTASSIUM SERPL-SCNC: 3.9 MMOL/L (ref 3.5–5.2)
PROT SERPL-MCNC: 7.1 G/DL (ref 6–8.5)
SODIUM SERPL-SCNC: 139 MMOL/L (ref 134–144)
TIBC SERPL-MCNC: 304 UG/DL (ref 250–450)
UIBC SERPL-MCNC: 235 UG/DL (ref 118–369)

## 2018-09-10 ENCOUNTER — OFFICE VISIT (OUTPATIENT)
Dept: ONCOLOGY | Age: 63
End: 2018-09-10

## 2018-09-10 ENCOUNTER — HOSPITAL ENCOUNTER (OUTPATIENT)
Dept: ONCOLOGY | Age: 63
Discharge: HOME | End: 2018-09-10

## 2018-09-10 VITALS
HEIGHT: 69 IN | SYSTOLIC BLOOD PRESSURE: 131 MMHG | RESPIRATION RATE: 17 BRPM | TEMPERATURE: 97 F | HEART RATE: 50 BPM | BODY MASS INDEX: 27.11 KG/M2 | OXYGEN SATURATION: 100 % | WEIGHT: 183 LBS | DIASTOLIC BLOOD PRESSURE: 75 MMHG

## 2018-09-10 DIAGNOSIS — D50.8 IRON DEFICIENCY ANEMIA SECONDARY TO INADEQUATE DIETARY IRON INTAKE: ICD-10-CM

## 2018-09-10 DIAGNOSIS — C50.212 MALIGNANT NEOPLASM OF UPPER-INNER QUADRANT OF LEFT FEMALE BREAST, UNSPECIFIED ESTROGEN RECEPTOR STATUS (HCC): Primary | ICD-10-CM

## 2018-09-10 DIAGNOSIS — C50.212 MALIGNANT NEOPLASM OF UPPER-INNER QUADRANT OF LEFT FEMALE BREAST, UNSPECIFIED ESTROGEN RECEPTOR STATUS (HCC): ICD-10-CM

## 2018-09-10 LAB
BASO+EOS+MONOS # BLD AUTO: 0.3 K/UL (ref 0–2.3)
BASO+EOS+MONOS # BLD AUTO: 7 % (ref 0.1–17)
DIFFERENTIAL METHOD BLD: ABNORMAL
ERYTHROCYTE [DISTWIDTH] IN BLOOD BY AUTOMATED COUNT: 13.3 % (ref 11.5–14.5)
HCT VFR BLD AUTO: 33.4 % (ref 36–48)
HGB BLD-MCNC: 11.3 G/DL (ref 12–16)
LYMPHOCYTES # BLD: 1.5 K/UL (ref 1.1–5.9)
LYMPHOCYTES NFR BLD: 34 % (ref 14–44)
MCH RBC QN AUTO: 30.1 PG (ref 25–35)
MCHC RBC AUTO-ENTMCNC: 33.8 G/DL (ref 31–37)
MCV RBC AUTO: 89.1 FL (ref 78–102)
NEUTS SEG # BLD: 2.6 K/UL (ref 1.8–9.5)
NEUTS SEG NFR BLD: 59 % (ref 40–70)
PLATELET # BLD AUTO: 172 K/UL (ref 140–440)
RBC # BLD AUTO: 3.75 M/UL (ref 4.1–5.1)
WBC # BLD AUTO: 4.4 K/UL (ref 4.5–13)

## 2018-09-10 RX ORDER — TRIAMTERENE AND HYDROCHLOROTHIAZIDE 75; 50 MG/1; MG/1
TABLET ORAL
COMMUNITY
Start: 2018-08-10

## 2018-09-10 RX ORDER — BISMUTH SUBSALICYLATE 262 MG
TABLET,CHEWABLE ORAL
COMMUNITY

## 2018-09-10 RX ORDER — MELATONIN
1000
COMMUNITY

## 2018-09-10 RX ORDER — LOSARTAN POTASSIUM 100 MG/1
TABLET ORAL
COMMUNITY
Start: 2017-09-29 | End: 2020-03-06

## 2018-09-10 RX ORDER — METFORMIN HYDROCHLORIDE 500 MG/1
TABLET, FILM COATED, EXTENDED RELEASE ORAL
COMMUNITY
Start: 2017-09-29 | End: 2022-09-22

## 2018-09-10 RX ORDER — SIMVASTATIN 40 MG/1
20 TABLET, FILM COATED ORAL
COMMUNITY

## 2018-09-10 NOTE — PROGRESS NOTES
Hematology/Oncology  Progress Note    Name: Dary Xavier  Date: 9/10/2018  : 1955    PCP: Stephen Estrella MD     Ms. Sp David is a 58 y.o. female who was seen for management of her invasive ductal adenocarcinoma of the left breast.    Current therapy: Femara 2.5mg PO daily. Subjective:     Mrs. Sp David is a 42-year-old woman who has a history of invasive ductal adenocarcinoma involving the left breast.  She is doing well. She continues to take Femara 2.5mg PO daily and is tolerating the treatment reasonably well. She has no new complaints or concerns to report. Past medical history, family history, and social history: these were reviewed and remains unchanged. Past Medical History:   Diagnosis Date    Anemia      Past Surgical History:   Procedure Laterality Date    BREAST SURGERY PROCEDURE UNLISTED      HX HYSTERECTOMY       Social History     Social History    Marital status:      Spouse name: N/A    Number of children: N/A    Years of education: N/A     Occupational History    Not on file. Social History Main Topics    Smoking status: Never Smoker    Smokeless tobacco: Never Used    Alcohol use No    Drug use: Not on file    Sexual activity: Not on file     Other Topics Concern    Not on file     Social History Narrative     Family History   Problem Relation Age of Onset    Heart Disease Mother     Cancer Mother      colon    Diabetes Father     Cancer Brother      prostate    Alzheimer Paternal Uncle     Other Maternal Grandfather      old age   [facility] Cancer Brother      colon    Alzheimer Cousin      Current Outpatient Prescriptions   Medication Sig Dispense Refill    losartan (COZAAR) 100 mg tablet Take 1 Tab by Mouth Once a Day.  metFORMIN (GLUMETZA ER) 500 mg TG24 24 hour tablet Take 1 Tab by Mouth Once a Day.  letrozole (FEMARA) 2.5 mg tablet TAKE ONE TABLET BY MOUTH ONCE DAILY 90 Tab 3    cholecalciferol (VITAMIN D3) 1,000 unit tablet 1,000 Units.       Ferrous Fumarate 325 mg (106 mg iron) tab Take  by Mouth Once a Day.  docosahexanoic acid/epa (FISH OIL PO) Take  by Mouth Once a Day.  multivitamin (ONE A DAY) tablet Take 1 Tab by Mouth Once a Day.  simvastatin (ZOCOR) 40 mg tablet 20 mg.      triamterene-hydroCHLOROthiazide (MAXZIDE) 75-50 mg per tablet          Review of Systems  Constitutional: The patient has no acute distress or discomfort. HEENT: The patient denies recent head trauma, eye pain, blurred vision,  hearing deficit, oropharyngeal mucosal pain or lesions, and the patient denies throat pain or discomfort. Lymphatics: The patient denies palpable peripheral lymphadenopathy. Hematologic: The patient denies having bruising, bleeding, or progressive fatigue. Respiratory: Patient denies having shortness of breath, cough, sputum production, fever, or dyspnea on exertion. Cardiovascular: The patient denies having leg pain, leg swelling, heart palpitations, chest permit, chest pain, or lightheadedness. The patient denies having dyspnea on exertion. Gastrointestinal: The patient denies having nausea, emesis, or diarrhea. The patient denies having any hematemesis or blood in the stool. Genitourinary: Patient denies having urinary urgency, frequency, or dysuria. The patient denies having blood in the urine. Psychological: The patient denies having symptoms of nervousness, anxiety, depression, or thoughts of harming self. Skin: Patient denies having skin rashes, skin, ulcerations, or unexplained itching or pruritus. Musculoskeletal: The patient denies having pain in the joints or bones. Objective:     Visit Vitals    /75    Pulse (!) 50    Temp 97 °F (36.1 °C) (Oral)    Resp 17    Ht 5' 9\" (1.753 m)    Wt 83 kg (183 lb)    SpO2 100%    BMI 27.02 kg/m2     ECOG PS=0; Pain score=0/10  Physical Exam:   Gen. Appearance: The patient is in no acute distress. Skin: There is no bruise or rash.   HEENT: The exam is unremarkable. Neck: Supple without lymphadenopathy or thyromegaly. Lungs: Clear to auscultation and percussion; there are no wheezes or rhonchi. Heart: Regular rate and rhythm; there are no murmurs, gallops, or rubs. Anterior chest wall and breast: there is no eveidence of disease recurrence. Axilla reveals no palpable lymphadenopathy. Abdomen: Bowel sounds are present and normal.  There is no guarding, tenderness, or hepatosplenomegaly. Extremities: There is no clubbing, cyanosis, or edema. Neurologic: There are no focal neurologic deficits. Lymphatics: There is no palpable peripheral lymphadenopathy. Musculoskeletal: The patient has full range of motion at all joints. There is no evidence of joint deformity or effusions. There is no focal joint tenderness. Psychological/psychiatric: There is no clinical evidence of anxiety, depression, or melancholy. Lab data:      Results for orders placed or performed during the hospital encounter of 09/10/18   CBC WITH 3 PART DIFF     Status: Abnormal   Result Value Ref Range Status    WBC 4.4 (L) 4.5 - 13.0 K/uL Final    RBC 3.75 (L) 4.10 - 5.10 M/uL Final    HGB 11.3 (L) 12.0 - 16 g/dL Final    HCT 33.4 (L) 36 - 48 % Final    MCV 89.1 78 - 102 FL Final    MCH 30.1 25.0 - 35.0 PG Final    MCHC 33.8 31 - 37 g/dL Final    RDW 13.3 11.5 - 14.5 % Final    PLATELET 648 792 - 451 K/uL Final    NEUTROPHILS 59 40 - 70 % Final    MIXED CELLS 7 0.1 - 17 % Final    LYMPHOCYTES 34 14 - 44 % Final    ABS. NEUTROPHILS 2.6 1.8 - 9.5 K/UL Final    ABS. MIXED CELLS 0.3 0.0 - 2.3 K/uL Final    ABS. LYMPHOCYTES 1.5 1.1 - 5.9 K/UL Final     Comment: Test performed at [place] [address] Location. Results Reviewed by Medical Director. DF AUTOMATED   Final           Assessment:     1. Malignant neoplasm of upper-inner quadrant of left female breast, unspecified estrogen receptor status ([facility] [address]    2.  Iron deficiency anemia secondary to inadequate dietary iron intake      Plan: Invasive ductal adenocarcinoma left breast: I have encouraged the patient to continue with her self breast exams. She will also continue with annual screening mammography. She completed her most recent screening  Mammogram on 3/16/2018 and there was no mammographic evidence of disease. Femara 2.5 mg daily will be continued. Her CA 27-29 on 18 was normal at 11.0 U/mL. At this time I will check her CA-27-29 level. Functional iron deficiency anemia: I have explained to the patient that her CBC  showed a hemoglobin of 11.3 g/dL with hematocrit 33.4%. I explained to the patient that her ferritin level from 3/8/2018 was normal at 165 ng/mL. She has not been taking the iron supplement on a regular basis. I advised her to begin taking 1 tablet of Slow Fe daily. I will check iron profile and ferritin levels at this time. Follow-up will occur in 3 months or sooner if indicated. Orders Placed This Encounter    COMPLETE CBC & AUTO DIFF WBC    InHouse CBC ([employer])     Standing Status:   Future     Number of Occurrences:   1     Standing Expiration Date:   2018    IRON PROFILE     Standing Status:   Future     Number of Occurrences:   1     Standing Expiration Date:   2019    FERRITIN     Standing Status:   Future     Number of Occurrences:   1     Standing Expiration Date:       METABOLIC PANEL, COMPREHENSIVE     Standing Status:   Future     Number of Occurrences:   1     Standing Expiration Date:   2019    CA 27.29     Standing Status:   Future     Number of Occurrences:   1     Standing Expiration Date:   2019    cholecalciferol (VITAMIN D3) 1,000 unit tablet     Si,000 Units.  Ferrous Fumarate 325 mg (106 mg iron) tab     Sig: Take  by Mouth Once a Day.  docosahexanoic acid/epa (FISH OIL PO)     Sig: Take  by Mouth Once a Day.  losartan (COZAAR) 100 mg tablet     Sig: Take 1 Tab by Mouth Once a Day.     metFORMIN (GLUMETZA ER) 500 mg TG24 24 hour tablet     Sig: Take 1 Tab by Mouth Once a Day.  multivitamin (ONE A DAY) tablet     Sig: Take 1 Tab by Mouth Once a Day.  simvastatin (ZOCOR) 40 mg tablet     Si mg.    triamterene-hydroCHLOROthiazide (MAXZIDE) 75-50 mg per tablet       Ana Hanson, NP   9/10/2018    I have assessed the patient independently and  agree with the full assessment as outlined.   Wilner Torres MD, Sergio Edwards

## 2018-09-10 NOTE — MR AVS SNAPSHOT
[address] [address] [address] [facility] [address] 
[phone] Patient: Cherelle Morelos MRN: [id number] XPV:[id number] Visit Information Date & Time Provider Department Dept. Phone Encounter #  
 9/10/2018 10:15 AM Lisset Real MD [facility] Medical Oncology [phone] [id number] Your Appointments 12/10/2018 10:30 AM  
Office Visit with Lisset Real MD  
[address]  Oncology [facility] Appt Note: 3 MO RET  
 [address] [address] [address] [facility], [address] [place] [address] 200 [facility] Upcoming Health Maintenance Date Due Hepatitis C Screening 1955 Pneumococcal 19-64 Highest Risk (1 of 3 - PCV13) 2/14/1973 DTaP/Tdap/Td series (1 - Tdap) 2/14/1975 PAP AKA CERVICAL CYTOLOGY 2/14/1975 FOBT Q 1 YEAR AGE 50-75 2/14/2004 ZOSTER VACCINE AGE 60> 12/14/2013 BREAST CANCER SCRN MAMMOGRAM 3/25/2018 Influenza Age 5 to Adult 8/1/2018 Allergies as of 9/10/2018  Review Complete On: 9/10/2018 By: Lisset Real MD  
 No Known Allergies Current Immunizations  Never Reviewed No immunizations on file. Not reviewed this visit You Were Diagnosed With   
  
 Codes Comments Malignant neoplasm of upper-inner quadrant of left female breast, unspecified [facility]    -  Primary ICD-10-CM: G97.854 ICD-9-CM: 174.2 Vitals BP Pulse Temp Resp Height(growth percentile) Weight(growth percentile) 131/75 (!) 50 97 °F (36.1 °C) (Oral) 17 5' 9\" (1.753 m) 183 lb (83 kg) SpO2 BMI Smoking Status 100% 27.02 kg/m2 Never Smoker BMI and BSA Data Body Mass Index Body Surface Area  
 27.02 kg/m 2 2.01 m 2 Preferred Pharmacy Pharmacy Name Phone [facility] [address] [place] [address]. [phone] Your Updated Medication List  
  
   
This list is accurate as of 9/10/18 11:24 AM.  Always use your most recent med list.  
  
  
  
  
 cholecalciferol 1,000 unit tablet Commonly known as:  VITAMIN D3  
1,000 Units. Ferrous Fumarate 325 mg (106 mg iron) Tab Take  by Mouth Once a Day. FISH OIL PO Take  by Mouth Once a Day. letrozole 2.5 mg tablet Commonly known as:  [facility] TAKE ONE TABLET BY MOUTH ONCE DAILY losartan 100 mg tablet Commonly known as:  COZAAR Take 1 Tab by Mouth Once a Day. metFORMIN 500 mg Tg24 24 hour tablet Commonly known asBrena Patella ER Take 1 Tab by Mouth Once a Day. multivitamin tablet Commonly known as:  ONE A DAY Take 1 Tab by Mouth Once a Day. simvastatin 40 mg tablet Commonly known as:  ZOCOR  
20 mg.  
  
 triamterene-hydroCHLOROthiazide 75-50 mg per tablet Commonly known as:  Elton Sheffielde We Performed the Following COMPLETE CBC & AUTO DIFF WBC [11952 CPT(R)] To-Do List   
 09/10/2018 Lab:  CBC WITH 3 PART DIFF Introducing [facility] SERVICES! [employer] introduces [employer] patient portal. Now you can access parts of your medical record, email your doctor's office, and request medication refills online. 1. In your internet browser, go to [email or web address] 2. Click on the First Time User? Click Here link in the Sign In box. You will see the New Member Sign Up page. 3. Enter your [employer] Access Code exactly as it appears below. You will not need to use this code after youve completed the sign-up process. If you do not sign up before the expiration date, you must request a new code. · [employer] Access Code: [email or web address] Expires: 9/18/2018  2:45 PM 
 
4. Enter the last four digits of your Social Security Number (xxxx) and Date of Birth (mm/dd/yyyy) as indicated and click Submit. You will be taken to the next sign-up page. 5. Create a [employer] ID. This will be your [employer] login ID and cannot be changed, so think of one that is secure and easy to remember. 6. Create a [employer] password. You can change your password at any time. 7. Enter your Password Reset Question and Answer. This can be used at a later time if you forget your password. 8. Enter your e-mail address. You will receive e-mail notification when new information is available in [address] 9. Click Sign Up. You can now view and download portions of your medical record. 10. Click the Download Summary menu link to download a portable copy of your medical information. If you have questions, please visit the Frequently Asked Questions section of the [employer] website. Remember, [employer] is NOT to be used for urgent needs. For medical emergencies, dial 911. Now available from your iPhone and Android! Please provide this summary of care documentation to your next provider. Your primary care clinician is listed as Reid Primrose. If you have any questions after today's visit, please call [phone].

## 2018-09-11 LAB
ALBUMIN SERPL-MCNC: 4.4 G/DL (ref 3.6–4.8)
ALBUMIN/GLOB SERPL: 1.5 {RATIO} (ref 1.2–2.2)
ALP SERPL-CCNC: 89 IU/L (ref 39–117)
ALT SERPL-CCNC: 9 IU/L (ref 0–32)
AST SERPL-CCNC: 17 IU/L (ref 0–40)
BILIRUB SERPL-MCNC: 0.4 MG/DL (ref 0–1.2)
BUN SERPL-MCNC: 19 MG/DL (ref 8–27)
BUN/CREAT SERPL: 23 (ref 12–28)
CALCIUM SERPL-MCNC: 9.8 MG/DL (ref 8.7–10.3)
CANCER AG27-29 SERPL-ACNC: 13.2 U/ML (ref 0–38.6)
CHLORIDE SERPL-SCNC: 99 MMOL/L (ref 96–106)
CO2 SERPL-SCNC: 27 MMOL/L (ref 20–29)
CREAT SERPL-MCNC: 0.82 MG/DL (ref 0.57–1)
FERRITIN SERPL-MCNC: 167 NG/ML (ref 15–150)
GLOBULIN SER CALC-MCNC: 3 G/DL (ref 1.5–4.5)
GLUCOSE SERPL-MCNC: 88 MG/DL (ref 65–99)
IRON SATN MFR SERPL: 24 % (ref 15–55)
IRON SERPL-MCNC: 73 UG/DL (ref 27–139)
POTASSIUM SERPL-SCNC: 3.6 MMOL/L (ref 3.5–5.2)
PROT SERPL-MCNC: 7.4 G/DL (ref 6–8.5)
SODIUM SERPL-SCNC: 140 MMOL/L (ref 134–144)
TIBC SERPL-MCNC: 303 UG/DL (ref 250–450)
UIBC SERPL-MCNC: 230 UG/DL (ref 118–369)

## 2018-12-10 ENCOUNTER — OFFICE VISIT (OUTPATIENT)
Dept: ONCOLOGY | Age: 63
End: 2018-12-10

## 2018-12-10 ENCOUNTER — HOSPITAL ENCOUNTER (OUTPATIENT)
Dept: LAB | Age: 63
Discharge: HOME | End: 2018-12-10
Payer: COMMERCIAL

## 2018-12-10 ENCOUNTER — HOSPITAL ENCOUNTER (OUTPATIENT)
Dept: ONCOLOGY | Age: 63
Discharge: HOME | End: 2018-12-10

## 2018-12-10 DIAGNOSIS — Z17.0 MALIGNANT NEOPLASM OF UPPER-INNER QUADRANT OF LEFT BREAST IN FEMALE, ESTROGEN RECEPTOR POSITIVE (HCC): ICD-10-CM

## 2018-12-10 DIAGNOSIS — D50.8 IRON DEFICIENCY ANEMIA SECONDARY TO INADEQUATE DIETARY IRON INTAKE: ICD-10-CM

## 2018-12-10 DIAGNOSIS — C50.212 MALIGNANT NEOPLASM OF UPPER-INNER QUADRANT OF LEFT BREAST IN FEMALE, ESTROGEN RECEPTOR POSITIVE (HCC): Primary | ICD-10-CM

## 2018-12-10 DIAGNOSIS — Z17.0 MALIGNANT NEOPLASM OF UPPER-INNER QUADRANT OF LEFT BREAST IN FEMALE, ESTROGEN RECEPTOR POSITIVE (HCC): Primary | ICD-10-CM

## 2018-12-10 DIAGNOSIS — C50.212 MALIGNANT NEOPLASM OF UPPER-INNER QUADRANT OF LEFT BREAST IN FEMALE, ESTROGEN RECEPTOR POSITIVE (HCC): ICD-10-CM

## 2018-12-10 LAB
ALBUMIN SERPL-MCNC: 3.9 G/DL (ref 3.4–5)
ALBUMIN/GLOB SERPL: 1.1 {RATIO} (ref 0.8–1.7)
ALP SERPL-CCNC: 96 U/L (ref 45–117)
ALT SERPL-CCNC: 15 U/L (ref 13–56)
ANION GAP SERPL CALC-SCNC: 5 MMOL/L (ref 3–18)
AST SERPL-CCNC: 12 U/L (ref 15–37)
BASO+EOS+MONOS # BLD AUTO: 0.5 K/UL (ref 0–2.3)
BASO+EOS+MONOS # BLD AUTO: 11 % (ref 0.1–17)
BILIRUB SERPL-MCNC: 0.4 MG/DL (ref 0.2–1)
BUN SERPL-MCNC: 20 MG/DL (ref 7–18)
BUN/CREAT SERPL: 25 (ref 12–20)
CALCIUM SERPL-MCNC: 9.3 MG/DL (ref 8.5–10.1)
CHLORIDE SERPL-SCNC: 104 MMOL/L (ref 100–108)
CO2 SERPL-SCNC: 30 MMOL/L (ref 21–32)
CREAT SERPL-MCNC: 0.79 MG/DL (ref 0.6–1.3)
DIFFERENTIAL METHOD BLD: ABNORMAL
ERYTHROCYTE [DISTWIDTH] IN BLOOD BY AUTOMATED COUNT: 13.9 % (ref 11.5–14.5)
FERRITIN SERPL-MCNC: 92 NG/ML (ref 8–388)
GLOBULIN SER CALC-MCNC: 3.4 G/DL (ref 2–4)
GLUCOSE SERPL-MCNC: 79 MG/DL (ref 74–99)
HCT VFR BLD AUTO: 32.1 % (ref 36–48)
HGB BLD-MCNC: 10.7 G/DL (ref 12–16)
IRON SATN MFR SERPL: 19 %
IRON SERPL-MCNC: 60 UG/DL (ref 50–175)
LYMPHOCYTES # BLD: 1.3 K/UL (ref 1.1–5.9)
LYMPHOCYTES NFR BLD: 26 % (ref 14–44)
MCH RBC QN AUTO: 30.1 PG (ref 25–35)
MCHC RBC AUTO-ENTMCNC: 33.3 G/DL (ref 31–37)
MCV RBC AUTO: 90.2 FL (ref 78–102)
NEUTS SEG # BLD: 3 K/UL (ref 1.8–9.5)
NEUTS SEG NFR BLD: 63 % (ref 40–70)
PLATELET # BLD AUTO: 179 K/UL (ref 140–440)
POTASSIUM SERPL-SCNC: 3.9 MMOL/L (ref 3.5–5.5)
PROT SERPL-MCNC: 7.3 G/DL (ref 6.4–8.2)
RBC # BLD AUTO: 3.56 M/UL (ref 4.1–5.1)
SODIUM SERPL-SCNC: 139 MMOL/L (ref 136–145)
TIBC SERPL-MCNC: 322 UG/DL (ref 250–450)
WBC # BLD AUTO: 4.8 K/UL (ref 4.5–13)

## 2018-12-10 PROCEDURE — 82728 ASSAY OF FERRITIN: CPT

## 2018-12-10 PROCEDURE — 86300 IMMUNOASSAY TUMOR CA 15-3: CPT

## 2018-12-10 PROCEDURE — 83540 ASSAY OF IRON: CPT

## 2018-12-10 PROCEDURE — 80053 COMPREHEN METABOLIC PANEL: CPT

## 2018-12-10 PROCEDURE — 36415 COLL VENOUS BLD VENIPUNCTURE: CPT

## 2018-12-10 NOTE — PATIENT INSTRUCTIONS
Breast Cancer: Care Instructions  Your Care Instructions    Breast cancer occurs when abnormal cells grow out of control in the breast. These cancer cells can spread within the breast, to nearby lymph nodes and other tissues, and to other parts of the body. Being treated for cancer can weaken your body, and you may feel very tired. Get the rest your body needs so you can feel better. Finding out that you have cancer is scary. You may feel many emotions and may need some help coping. Seek out family, friends, and counselors for support. You also can do things at home to make yourself feel better while you go through treatment. Call the [employer] (8-305.350.5719) or visit its website at [employer] for more information. Follow-up care is a key part of your treatment and safety. Be sure to make and go to all appointments, and call your doctor if you are having problems. It's also a good idea to know your test results and keep a list of the medicines you take. How can you care for yourself at home? · Take your medicines exactly as prescribed. Call your doctor if you think you are having a problem with your medicine. You may get medicine for nausea and vomiting if you have these side effects. · Follow your doctor's instructions to relieve pain. Pain from cancer and surgery can almost always be controlled. Use pain medicine when you first notice pain, before it becomes severe. · Eat healthy food. If you do not feel like eating, try to eat food that has protein and extra calories to keep up your strength and prevent weight loss. Drink liquid meal replacements for extra calories and protein. Try to eat your main meal early. · Get some physical activity every day, but do not get too tired. Keep doing the hobbies you enjoy as your energy allows. · Do not smoke. Smoking can make your cancer worse. If you need help quitting, talk to your doctor about stop-smoking programs and medicines.  These can increase your chances of quitting for good. · Take steps to control your stress and workload. Learn relaxation techniques. ? Share your feelings. Stress and tension affect our emotions. By expressing your feelings to others, you may be able to understand and cope with them. ? Consider joining a support group. Talking about a problem with your spouse, a good friend, or other people with similar problems is a good way to reduce tension and stress. ? Express yourself through art. Try writing, crafts, dance, or art to relieve stress. Some dance, writing, or art groups may be available just for people who have cancer. ? Be kind to your body and mind. Getting enough sleep, eating a healthy diet, and taking time to do things you enjoy can contribute to an overall feeling of balance in your life and can help reduce stress. ? Get help if you need it. Discuss your concerns with your doctor or counselor. · If you are vomiting or have diarrhea:  ? Drink plenty of fluids (enough so that your urine is light yellow or clear like water) to prevent dehydration. Choose water and other caffeine-free clear liquids. If you have kidney, heart, or liver disease and have to limit fluids, talk with your doctor before you increase the amount of fluids you drink. ? When you are able to eat, try clear soups, mild foods, and liquids until all symptoms are gone for 12 to 48 hours. Other good choices include dry toast, crackers, cooked cereal, and gelatin dessert, such as Jell-O.  · If you have not already done so, prepare a list of advance directives. Advance directives are instructions to your doctor and family members about what kind of care you want if you become unable to speak or express yourself. When should you call for help? Call 911 anytime you think you may need emergency care.  For example, call if:    · You passed out (lost consciousness).    Call your doctor now or seek immediate medical care if:    · You have a fever.     · You have abnormal bleeding.     · You think you have an infection.     · You have new or worse pain.     · You have new symptoms, such as a cough, belly pain, vomiting, diarrhea, or a rash.    Watch closely for changes in your health, and be sure to contact your doctor if:    · You are much more tired than usual.     · You have swollen glands in your armpits, groin, or neck.     · You do not get better as expected. Where can you learn more? Go to http://ashlie-funmilayo.info/. Enter V321 in the search box to learn more about \"Breast Cancer: Care Instructions. \"  Current as of: March 28, 2018  Content Version: 11.8  © 8509-4385 [employer]. Care instructions adapted under license by [employer] (which disclaims liability or warranty for this information). If you have questions about a medical condition or this instruction, always ask your healthcare professional. [place] [address] any warranty or liability for your use of this information. Letrozole (By mouth)   Letrozole (LET-julio cesar-zole)  Treats breast cancer. Brand Name(s): Yary Bales Femara Co-Pack   There may be other brand names for this medicine. When This Medicine Should Not Be Used: This medicine is not right for everyone. Do not use it if you had an allergic reaction to letrozole, or if you are pregnant. How to Use This Medicine:   Tablet  · Your doctor will tell you how much medicine to use. Do not use more than directed. · Missed dose: This medicine needs to be given on a fixed schedule. If you miss a dose, call your doctor for instructions. · Store the medicine in a closed container at room temperature, away from heat, moisture, and direct light. Drugs and Foods to Avoid:   Ask your doctor or pharmacist before using any other medicine, including over-the-counter medicines, vitamins, and herbal products. · Some medicines can affect how letrozole works.  Tell your doctor if you are also using tamoxifen. Warnings While Using This Medicine:   · It is not safe to take this medicine during pregnancy. It could harm an unborn baby. Tell your doctor right away if you become pregnant. Use an effective form of birth control during treatment with this medicine and for at least 3 weeks after the last dose. · Do not breastfeed while you are taking this medicine and for at least 3 weeks after your last dose. · Tell your doctor if you have liver disease (including cirrhosis), bone problems (including osteoporosis), or high cholesterol in the blood. · This medicine may cause the following problems:  ¨ Low bone mineral density  ¨ High cholesterol or fat levels in the blood  ¨ Liver problems  · This medicine may make you dizzy, drowsy, or tired. Do not drive or do anything else that could be dangerous until you know how this medicine affects you. · This medicine could cause infertility. Talk with your doctor before using this medicine if you plan to have children. · Medicines used to treat cancer are very strong and can have many side effects. Before receiving this medicine, make sure you understand all the risks and benefits. It is important for you to work closely with your doctor during your treatment. · Your doctor will do lab tests at regular visits to check on the effects of this medicine. Keep all appointments. · Keep all medicine out of the reach of children. Never share your medicine with anyone.   Possible Side Effects While Using This Medicine:   Call your doctor right away if you notice any of these side effects:  · Allergic reaction: Itching or hives, swelling in your face or hands, swelling or tingling in your mouth or throat, chest tightness, trouble breathing  · Bone pain  · Chest pain, trouble breathing, coughing up blood  · Dark urine, pale stools, nausea, vomiting, loss of appetite, stomach pain, yellow skin or eyes  · Numbness or weakness on one side of your body, sudden or severe headache, problems with vision, speech, or walking  · Pain in your lower leg (calf)  · Swelling in your ankles or feet  · Unusual bleeding or bruising  · Unusual tiredness or weakness  If you notice these less serious side effects, talk with your doctor:   · Breast pain  · Diarrhea, constipation, stomach pain  · Headache  · Increased sweating  · Mild joint, back, or muscle pain  · Trouble sleeping  · Vaginal bleeding  · Warmth or redness in your face, neck, arms, or upper chest  · Weight gain or loss  If you notice other side effects that you think are caused by this medicine, tell your doctor. Call your doctor for medical advice about side effects. You may report side effects to FDA at 5-678-FDA-7924  © 2017 [address] Information is for End User's use only and may not be sold, redistributed or otherwise used for commercial purposes. The above information is an  only. It is not intended as medical advice for individual conditions or treatments. Talk to your doctor, nurse or pharmacist before following any medical regimen to see if it is safe and effective for you.

## 2018-12-10 NOTE — PROGRESS NOTES
Hematology/Oncology  Progress Note    Name: Lee Navarro  Date: 12/10/2018  : 1955    PCP: Rachel Best MD     Ms. Becka Ashby is a 58 y.o. female who was seen for management of her invasive ductal adenocarcinoma of the left breast.    Current therapy: Femara 2.5mg PO daily. Subjective:     Mrs. Becka Ashby is a 64-year-old woman who has a history of invasive ductal adenocarcinoma involving the left breast.  She is doing well. She continues to take Femara 2.5mg PO daily and is tolerating the treatment reasonably well. She has no new complaints or concerns to report. Past medical history, family history, and social history: these were reviewed and remains unchanged.     Past Medical History:   Diagnosis Date    Anemia      Past Surgical History:   Procedure Laterality Date    BREAST SURGERY PROCEDURE UNLISTED      HX HYSTERECTOMY       Social History     Socioeconomic History    Marital status:      Spouse name: Not on file    Number of children: Not on file    Years of education: Not on file    Highest education level: Not on file   Social Needs    Financial resource strain: Not on file    Food insecurity - worry: Not on file    Food insecurity - inability: Not on file   [employer] needs - medical: Not on file   [employer] needs - non-medical: Not on file   Occupational History    Not on file   Tobacco Use    Smoking status: Never Smoker    Smokeless tobacco: Never Used   Substance and Sexual Activity    Alcohol use: No    Drug use: Not on file    Sexual activity: Not on file   Other Topics Concern    Not on file   Social History Narrative    Not on file     Family History   Problem Relation Age of Onset    Heart Disease Mother     Cancer Mother         colon    Diabetes Father     Cancer Brother         prostate    Alzheimer Paternal Uncle     Other Maternal Grandfather         old age    Cancer Brother         colon    Alzheimer Cousin      Current Outpatient Medications   Medication Sig Dispense Refill    cholecalciferol (VITAMIN D3) 1,000 unit tablet 1,000 Units.  Ferrous Fumarate 325 mg (106 mg iron) tab Take  by Mouth Once a Day.  docosahexanoic acid/epa (FISH OIL PO) Take  by Mouth Once a Day.  losartan (COZAAR) 100 mg tablet Take 1 Tab by Mouth Once a Day.  metFORMIN (GLUMETZA ER) 500 mg TG24 24 hour tablet Take 1 Tab by Mouth Once a Day.  multivitamin (ONE A DAY) tablet Take 1 Tab by Mouth Once a Day.  simvastatin (ZOCOR) 40 mg tablet 20 mg.      triamterene-hydroCHLOROthiazide (MAXZIDE) 75-50 mg per tablet       letrozole (FEMARA) 2.5 mg tablet TAKE ONE TABLET BY MOUTH ONCE DAILY 90 Tab 3       Review of Systems  Constitutional: The patient has no acute distress or discomfort. HEENT: The patient denies recent head trauma, eye pain, blurred vision,  hearing deficit, oropharyngeal mucosal pain or lesions, and the patient denies throat pain or discomfort. Lymphatics: The patient denies palpable peripheral lymphadenopathy. Hematologic: The patient denies having bruising, bleeding, or progressive fatigue. Respiratory: Patient denies having shortness of breath, cough, sputum production, fever, or dyspnea on exertion. Cardiovascular: The patient denies having leg pain, leg swelling, heart palpitations, chest permit, chest pain, or lightheadedness. The patient denies having dyspnea on exertion. Gastrointestinal: The patient denies having nausea, emesis, or diarrhea. The patient denies having any hematemesis or blood in the stool. Genitourinary: Patient denies having urinary urgency, frequency, or dysuria. The patient denies having blood in the urine. Psychological: The patient denies having symptoms of nervousness, anxiety, depression, or thoughts of harming self. Skin: Patient denies having skin rashes, skin, ulcerations, or unexplained itching or pruritus.   Musculoskeletal: The patient denies having pain in the joints or bones.      Objective: There were no vitals taken for this visit. ECOG PS=0; Pain score=0/10  Physical Exam:   Gen. Appearance: The patient is in no acute distress. Skin: There is no bruise or rash. HEENT: The exam is unremarkable. Neck: Supple without lymphadenopathy or thyromegaly. Lungs: Clear to auscultation and percussion; there are no wheezes or rhonchi. Heart: Regular rate and rhythm; there are no murmurs, gallops, or rubs. Anterior chest wall and breast: there is no eveidence of disease recurrence. Axilla reveals no palpable lymphadenopathy. Abdomen: Bowel sounds are present and normal.  There is no guarding, tenderness, or hepatosplenomegaly. Extremities: There is no clubbing, cyanosis, or edema. Neurologic: There are no focal neurologic deficits. Lymphatics: There is no palpable peripheral lymphadenopathy. Musculoskeletal: The patient has full range of motion at all joints. There is no evidence of joint deformity or effusions. There is no focal joint tenderness. Psychological/psychiatric: There is no clinical evidence of anxiety, depression, or melancholy. Lab data:      Results for orders placed or performed during the hospital encounter of 12/10/18   CBC WITH 3 PART DIFF     Status: Abnormal   Result Value Ref Range Status    WBC 4.8 4.5 - 13.0 K/uL Final    RBC 3.56 (L) 4.10 - 5.10 M/uL Final    HGB 10.7 (L) 12.0 - 16 g/dL Final    HCT 32.1 (L) 36 - 48 % Final    MCV 90.2 78 - 102 FL Final    MCH 30.1 25.0 - 35.0 PG Final    MCHC 33.3 31 - 37 g/dL Final    RDW 13.9 11.5 - 14.5 % Final    PLATELET 505 190 - 892 K/uL Final    NEUTROPHILS 63 40 - 70 % Final    MIXED CELLS 11 0.1 - 17 % Final    LYMPHOCYTES 26 14 - 44 % Final    ABS. NEUTROPHILS 3.0 1.8 - 9.5 K/UL Final    ABS. MIXED CELLS 0.5 0.0 - 2.3 K/uL Final    ABS. LYMPHOCYTES 1.3 1.1 - 5.9 K/UL Final     Comment: Test performed at [place] [address] Location. Results Reviewed by Medical Director.     DF AUTOMATED   Final Assessment:     1. Malignant neoplasm of upper-inner quadrant of left breast in female, estrogen receptor positive ([address]    2. Iron deficiency anemia secondary to inadequate dietary iron intake      Plan:   Invasive ductal adenocarcinoma left breast: I have encouraged the patient to continue with her self breast exams. She will also continue with annual screening mammography. She completed her most recent screening  Mammogram on 3/16/2018 and there was no mammographic evidence of disease. Femara 2.5mg PO daily will be continued. Her CA 27-29 on 09/12/18 was normal at 13.2U/mL. At this time I will check her CA-27-29 level. Functional iron deficiency anemia: I have explained to the patient that her CBC  showed a hemoglobin of 10.7g/dL with hematocrit 32.1%. I explained to the patient that her ferritin level from 09/12/2018 was normal at 167ng/mL. She has not been taking the iron supplement on a regular basis. I advised her to begin taking 1 tablet of Slow Fe daily. I will check her iron profile and ferritin levels at this time. Follow-up will occur in 3 months or sooner if indicated. Orders Placed This Encounter    COMPLETE CBC & AUTO DIFF WBC    METABOLIC PANEL, COMPREHENSIVE     Standing Status:   Future     Standing Expiration Date:   12/11/2019    FERRITIN     Standing Status:   Future     Standing Expiration Date:   12/11/2019    CA 27.29     Standing Status:   Future     Standing Expiration Date:   12/11/2019    InHouse CBC ([employer])     Standing Status:   Future     Number of Occurrences:   1     Standing Expiration Date:   12/17/2018    IRON PROFILE     Standing Status:   Future     Standing Expiration Date:   12/11/2019       Marquise Juárez NP   12/10/2018    I have assessed the patient independently and  agree with the full assessment as outlined.   Ady Davila MD, Sultana Grimaldo

## 2018-12-11 LAB — CANCER AG27-29 SERPL-ACNC: 12.5 U/ML (ref 0–38.6)

## 2019-02-25 ENCOUNTER — OFFICE VISIT (OUTPATIENT)
Dept: ORTHOPEDIC SURGERY | Facility: CLINIC | Age: 64
End: 2019-02-25

## 2019-02-25 VITALS
RESPIRATION RATE: 18 BRPM | TEMPERATURE: 96.1 F | SYSTOLIC BLOOD PRESSURE: 134 MMHG | OXYGEN SATURATION: 100 % | HEIGHT: 69 IN | WEIGHT: 187 LBS | BODY MASS INDEX: 27.7 KG/M2 | DIASTOLIC BLOOD PRESSURE: 63 MMHG | HEART RATE: 59 BPM

## 2019-02-25 DIAGNOSIS — M54.50 LUMBAR PAIN: ICD-10-CM

## 2019-02-25 DIAGNOSIS — M16.11 PRIMARY OSTEOARTHRITIS OF RIGHT HIP: Primary | ICD-10-CM

## 2019-02-25 DIAGNOSIS — M51.36 DDD (DEGENERATIVE DISC DISEASE), LUMBAR: ICD-10-CM

## 2019-02-25 DIAGNOSIS — M25.551 ACUTE HIP PAIN, RIGHT: ICD-10-CM

## 2019-02-25 RX ORDER — BETAMETHASONE SODIUM PHOSPHATE AND BETAMETHASONE ACETATE 3; 3 MG/ML; MG/ML
6 INJECTION, SUSPENSION INTRA-ARTICULAR; INTRALESIONAL; INTRAMUSCULAR; SOFT TISSUE ONCE
Qty: 0.5 ML | Refills: 0
Start: 2019-02-25 | End: 2019-02-25

## 2019-02-25 RX ORDER — BUPIVACAINE HYDROCHLORIDE 2.5 MG/ML
4 INJECTION, SOLUTION EPIDURAL; INFILTRATION; INTRACAUDAL ONCE
Qty: 4 ML | Refills: 0
Start: 2019-02-25 | End: 2019-02-25

## 2019-02-25 NOTE — PROGRESS NOTES
Patient: Cristhian Palafox                MRN: [id number]       SSN: [id number]  YOB: 1955        AGE: 71 y.o. SEX: female    PCP: Xochilt Manzo MD  02/25/19    Chief Complaint   Patient presents with    Hip Pain     Right Groin     HISTORY:  Cristhian Palafox is a 71 y.o. female who is seen for right hip pain present for the past several months. She has been experiencing increasing right hip pain for the past week. There was no previous injury. She notes pain with standing and walking. She has pain and difficulty with stair climbing. She has pain with twisting, bending, and abducting. She has pain with lifting her leg. She reports significant groin pain and some lateral hip pain. She has a h/o lumbar pain. She has no pain radiating to her feet. Pain Assessment  2/25/2019   Location of Pain Hip   Location Modifiers Right   Severity of Pain 0   Limiting Behavior No     Occupation, etc:  Ms. Dillan Mendieta retired in 2012 as a  at the [employer]. She lives with her  and mother in [place]. She has a son, 5 grand children and 2 great grand children. She is a metformin dependent diabetic. She notes that metformin helped her lose weight. She reports she was previously 250 lbs. Ms. Dillan Mendieta weighs 187 lbs and is 5'9\" tall.        Lab Results   Component Value Date/Time    Hemoglobin A1c 6.4 (H) 09/27/2010 02:52 PM     Weight Metrics 2/25/2019 9/10/2018 3/5/2018 12/4/2017 9/1/2017 5/23/2017 4/25/2017   Weight 187 lb 183 lb 194 lb 191 lb 187 lb 6.4 oz 189 lb 190 lb   BMI 27.62 kg/m2 27.02 kg/m2 28.65 kg/m2 28.21 kg/m2 27.67 kg/m2 27.91 kg/m2 28.06 kg/m2       Patient Active Problem List   Diagnosis Code    Malignant neoplasm of upper-inner quadrant of left female breast ([facility] [address] C50.212    Iron deficiency anemia secondary to inadequate dietary iron intake D50.8     REVIEW OF SYSTEMS: All Below are Negative except: See HPI   Constitutional: negative for fever, chills, and weight loss.   Cardiovascular: negative for chest pain, claudication, leg swelling, SOB, DEAL   Gastrointestinal: Negative for pain, N/V/C/D, Blood in stool or urine, dysuria,  hematuria, incontinence, pelvic pain. Musculoskeletal: See HPI   Neurological: Negative for dizziness and weakness. Negative for headaches, Visual changes, confusion, seizures   Phychiatric/Behavioral: Negative for depression, memory loss, substance  abuse. Extremities: Negative for hair changes, rash, or skin lesion changes. Hematologic: Negative for bleeding problems, bruising, pallor or swollen lymph  nodes   Peripheral Vascular: No calf pain, no circulation deficits. Social History     Socioeconomic History    Marital status:      Spouse name: Not on file    Number of children: Not on file    Years of education: Not on file    Highest education level: Not on file   Social Needs    Financial resource strain: Not on file    Food insecurity - worry: Not on file    Food insecurity - inability: Not on file    Transportation needs - medical: Not on file   [employer] needs - non-medical: Not on file   Occupational History    Not on file   Tobacco Use    Smoking status: Never Smoker    Smokeless tobacco: Never Used   Substance and Sexual Activity    Alcohol use: No    Drug use: Not on file    Sexual activity: Not on file   Other Topics Concern    Not on file   Social History Narrative    Not on file      No Known Allergies   Current Outpatient Medications   Medication Sig    cholecalciferol (VITAMIN D3) 1,000 unit tablet 1,000 Units.  Ferrous Fumarate 325 mg (106 mg iron) tab Take  by Mouth Once a Day.  docosahexanoic acid/epa (FISH OIL PO) Take  by Mouth Once a Day.  losartan (COZAAR) 100 mg tablet Take 1 Tab by Mouth Once a Day.  metFORMIN (GLUMETZA ER) 500 mg TG24 24 hour tablet Take 1 Tab by Mouth Once a Day.  multivitamin (ONE A DAY) tablet Take 1 Tab by Mouth Once a Day.      simvastatin (ZOCOR) 40 mg tablet 20 mg.    triamterene-hydroCHLOROthiazide (MAXZIDE) 75-50 mg per tablet     letrozole (FEMARA) 2.5 mg tablet TAKE ONE TABLET BY MOUTH ONCE DAILY     No current facility-administered medications for this visit. PHYSICAL EXAMINATION:  Visit Vitals  /63   Pulse (!) 59   Temp 96.1 °F (35.6 °C) (Oral)   Resp 18   Ht 5' 9\" (1.753 m)   Wt 187 lb (84.8 kg)   SpO2 100%   BMI 27.62 kg/m²      ORTHO EXAMINATION:  Examination Right hip Left hip   Skin Intact Intact   External Rotation ROM 10 20   Internal Rotation ROM 10 10   Trochanteric tenderness + posterior -   Hip flexion contracture - -   Antalgic gait - -   Trendelenberg sign - -   Lumbar tenderness - -   Straight leg raise - -   Calf tenderness - -   Neurovascular Intact Intact     Examination Lumbar Thoracic   Skin Intact Intact   Tenderness + -   Tightness - -   Lordosis Normal N/A   Kyphosis N/A Normal   Scoliosis - -   Flexion Fingertips to mid shin N/A   Extension 10 N/A   Knee reflexes Normal N/A   Ankle reflexes Normal N/A   Straight leg raise - N/A   Calf tenderness - N/A       TIME OUT:  Chart reviewed for the following:   I, Sasha Flynn MD, have reviewed the History, Physical and updated the Allergic reactions for [address] [place] [address] performed immediately prior to start of procedure:  Blair Crar MD, have performed the following reviews on [facility] prior to the start of the procedure:          * Patient was identified by name and date of birth   * Agreement on procedure being performed was verified  * Risks and Benefits explained to the patient  * Procedure site verified and marked as necessary  * Patient was positioned for comfort  * Consent was obtained     Time: 11:07 AM     Date of procedure: 2/25/2019  Procedure performed by:  Sasha Flynn MD  Ms. Salinas tolerated the procedure well with no complications.     RADIOGRAPHS:  XR RIGHT HIP 2/25/19 LAURYN  IMPRESSION:  AP pelvis and two views - No fractures, moderate joint space narrowing, + osteophytes present. Tonnis grade 2. Facet joint narrowing L5-S1      IMPRESSION:      ICD-10-CM ICD-9-CM    1. Primary osteoarthritis of right hip M16.11 715.15 betamethasone (CELESTONE SOLUSPAN) 6 mg/mL injection      BETAMETHASONE ACETATE & SODIUM PHOSPHATE INJECTION 3 MG EA.      DRAIN/INJECT LARGE JOINT/BURSA      bupivacaine, PF, (MARCAINE, PF,) 0.25 % (2.5 mg/mL) injection   2. Acute hip pain, right M25.551 719.45 AMB POC X-RAY RADEX HIP UNI WITH PELVIS 2-3 VIEWS      betamethasone (CELESTONE SOLUSPAN) 6 mg/mL injection      BETAMETHASONE ACETATE & SODIUM PHOSPHATE INJECTION 3 MG EA.      DRAIN/INJECT LARGE JOINT/BURSA      bupivacaine, PF, (MARCAINE, PF,) 0.25 % (2.5 mg/mL) injection   3. DDD (degenerative disc disease), lumbar M51.36 722.52 REFERRAL TO SPINE SURGERY   4. Lumbar pain M54.5 724.2 REFERRAL TO SPINE SURGERY     PLAN:  After discussing treatment options, patient's right hip was injected with 4 cc Marcaine and 1/2 cc Celestone. There is no need for surgery at this time. She will follow up at the spine center.      Scribed by Arlen Bullard ([address]) as dictated by Agustina Bah MD

## 2019-03-06 ENCOUNTER — TELEPHONE (OUTPATIENT)
Dept: ORTHOPEDIC SURGERY | Facility: CLINIC | Age: 64
End: 2019-03-06

## 2019-03-06 NOTE — TELEPHONE ENCOUNTER
Pt came by the h/s ortho location states no one has called her to schedule her spine appt or her physical therapy. Upon looking at dr Lars Giles last [address] note of 2/25/19 there is no mention of physical therapy. Please call the pt to discuss.     Pt p#[phone]

## 2019-03-11 ENCOUNTER — OFFICE VISIT (OUTPATIENT)
Dept: ONCOLOGY | Age: 64
End: 2019-03-11

## 2019-03-11 ENCOUNTER — HOSPITAL ENCOUNTER (OUTPATIENT)
Dept: LAB | Age: 64
Discharge: HOME | End: 2019-03-11
Payer: COMMERCIAL

## 2019-03-11 ENCOUNTER — HOSPITAL ENCOUNTER (OUTPATIENT)
Dept: ONCOLOGY | Age: 64
Discharge: HOME | End: 2019-03-11

## 2019-03-11 VITALS
DIASTOLIC BLOOD PRESSURE: 73 MMHG | SYSTOLIC BLOOD PRESSURE: 131 MMHG | WEIGHT: 185 LBS | TEMPERATURE: 97.7 F | RESPIRATION RATE: 18 BRPM | HEART RATE: 55 BPM | BODY MASS INDEX: 27.4 KG/M2 | HEIGHT: 69 IN | OXYGEN SATURATION: 99 %

## 2019-03-11 DIAGNOSIS — Z17.0 MALIGNANT NEOPLASM OF UPPER-INNER QUADRANT OF LEFT BREAST IN FEMALE, ESTROGEN RECEPTOR POSITIVE (HCC): Primary | ICD-10-CM

## 2019-03-11 DIAGNOSIS — D50.8 IRON DEFICIENCY ANEMIA SECONDARY TO INADEQUATE DIETARY IRON INTAKE: ICD-10-CM

## 2019-03-11 DIAGNOSIS — Z17.0 MALIGNANT NEOPLASM OF UPPER-INNER QUADRANT OF LEFT BREAST IN FEMALE, ESTROGEN RECEPTOR POSITIVE (HCC): ICD-10-CM

## 2019-03-11 DIAGNOSIS — C50.212 MALIGNANT NEOPLASM OF UPPER-INNER QUADRANT OF LEFT BREAST IN FEMALE, ESTROGEN RECEPTOR POSITIVE (HCC): Primary | ICD-10-CM

## 2019-03-11 DIAGNOSIS — C50.212 MALIGNANT NEOPLASM OF UPPER-INNER QUADRANT OF LEFT BREAST IN FEMALE, ESTROGEN RECEPTOR POSITIVE (HCC): ICD-10-CM

## 2019-03-11 LAB
ALBUMIN SERPL-MCNC: 4.2 G/DL (ref 3.4–5)
ALBUMIN/GLOB SERPL: 1.2 {RATIO} (ref 0.8–1.7)
ALP SERPL-CCNC: 98 U/L (ref 45–117)
ALT SERPL-CCNC: 16 U/L (ref 13–56)
ANION GAP SERPL CALC-SCNC: 8 MMOL/L (ref 3–18)
AST SERPL-CCNC: 14 U/L (ref 15–37)
BASO+EOS+MONOS # BLD AUTO: 0.5 K/UL (ref 0–2.3)
BASO+EOS+MONOS NFR BLD AUTO: 9 % (ref 0.1–17)
BILIRUB SERPL-MCNC: 0.5 MG/DL (ref 0.2–1)
BUN SERPL-MCNC: 16 MG/DL (ref 7–18)
BUN/CREAT SERPL: 19 (ref 12–20)
CALCIUM SERPL-MCNC: 9.6 MG/DL (ref 8.5–10.1)
CHLORIDE SERPL-SCNC: 101 MMOL/L (ref 100–108)
CO2 SERPL-SCNC: 29 MMOL/L (ref 21–32)
CREAT SERPL-MCNC: 0.84 MG/DL (ref 0.6–1.3)
DIFFERENTIAL METHOD BLD: ABNORMAL
ERYTHROCYTE [DISTWIDTH] IN BLOOD BY AUTOMATED COUNT: 13.7 % (ref 11.5–14.5)
FERRITIN SERPL-MCNC: 113 NG/ML (ref 8–388)
GLOBULIN SER CALC-MCNC: 3.6 G/DL (ref 2–4)
GLUCOSE SERPL-MCNC: 90 MG/DL (ref 74–99)
HCT VFR BLD AUTO: 33.9 % (ref 36–48)
HGB BLD-MCNC: 11.4 G/DL (ref 12–16)
IRON SATN MFR SERPL: 20 %
IRON SERPL-MCNC: 69 UG/DL (ref 50–175)
LYMPHOCYTES # BLD: 1.5 K/UL (ref 1.1–5.9)
LYMPHOCYTES NFR BLD: 30 % (ref 14–44)
MCH RBC QN AUTO: 29.9 PG (ref 25–35)
MCHC RBC AUTO-ENTMCNC: 33.6 G/DL (ref 31–37)
MCV RBC AUTO: 89 FL (ref 78–102)
NEUTS SEG # BLD: 3 K/UL (ref 1.8–9.5)
NEUTS SEG NFR BLD: 61 % (ref 40–70)
PLATELET # BLD AUTO: 196 K/UL (ref 135–420)
POTASSIUM SERPL-SCNC: 3.7 MMOL/L (ref 3.5–5.5)
PROT SERPL-MCNC: 7.8 G/DL (ref 6.4–8.2)
RBC # BLD AUTO: 3.81 M/UL (ref 4.1–5.1)
SODIUM SERPL-SCNC: 138 MMOL/L (ref 136–145)
TIBC SERPL-MCNC: 345 UG/DL (ref 250–450)
WBC # BLD AUTO: 5 K/UL (ref 4.5–13)

## 2019-03-11 PROCEDURE — 86300 IMMUNOASSAY TUMOR CA 15-3: CPT

## 2019-03-11 PROCEDURE — 80053 COMPREHEN METABOLIC PANEL: CPT

## 2019-03-11 PROCEDURE — 82728 ASSAY OF FERRITIN: CPT

## 2019-03-11 PROCEDURE — 83540 ASSAY OF IRON: CPT

## 2019-03-11 NOTE — PATIENT INSTRUCTIONS
Iron Deficiency Anemia: Care Instructions  Your Care Instructions    Anemia means that you do not have enough red blood cells. Red blood cells carry oxygen around your body. When you have anemia, it can make you pale, weak, and tired. Many things can cause anemia. The most common cause is loss of blood. This can happen if you have heavy menstrual periods. It can also happen if you have bleeding in your stomach or bowel. You can also get anemia if you don't have enough iron in your diet or if it's hard for your body to absorb iron. In some cases, pregnancy causes anemia. That's because a pregnant woman needs more iron. Your doctor may do more tests to find the cause of your anemia. If a disease or other health problem is causing it, your doctor will treat that problem. It's important to follow up with your doctor to make sure that your iron level returns to normal.  Follow-up care is a key part of your treatment and safety. Be sure to make and go to all appointments, and call your doctor if you are having problems. It's also a good idea to know your test results and keep a list of the medicines you take. How can you care for yourself at home? · If your doctor recommended iron pills, take them as directed. ? Try to take the pills on an empty stomach. You can do this about 1 hour before or 2 hours after meals. But you may need to take iron with food to avoid an upset stomach. ? Do not take antacids or drink milk or anything with caffeine within 2 hours of when you take your iron. They can keep your body from absorbing the iron well. ? Vitamin C helps your body absorb iron. You may want to take iron pills with a glass of orange juice or some other food high in vitamin C.  ? Iron pills may cause stomach problems. These include heartburn, nausea, diarrhea, constipation, and cramps. It can help to drink plenty of fluids and include fruits, vegetables, and fiber in your diet.   ? It's normal for iron pills to make your stool a greenish or grayish black. But internal bleeding can also cause dark stool. So it's important to tell your doctor about any color changes. ? Call your doctor if you think you are having a problem with your iron pills. Even after you start to feel better, it will take several months for your body to build up its supply of iron. ? If you miss a pill, don't take a double dose. ? Keep iron pills out of the reach of small children. Too much iron can be very dangerous. · Eat foods with a lot of iron. These include red meat, shellfish, poultry, and eggs. They also include beans, raisins, whole-grain bread, and leafy green vegetables. · Steam your vegetables. This is the best way to prepare them if you want to get as much iron as possible. · Be safe with medicines. Do not take nonsteroidal anti-inflammatory pain relievers unless your doctor tells you to. These include aspirin, naproxen (Aleve), and ibuprofen (Advil, Motrin). · Liquid iron can stain your teeth. But you can mix it with water or juice and drink it with a straw. Then it won't get on your teeth. When should you call for help? Call 911 anytime you think you may need emergency care. For example, call if:    · You passed out (lost consciousness).    Call your doctor now or seek immediate medical care if:    · You are short of breath.     · You are dizzy or light-headed, or you feel like you may faint.     · You have new or worse bleeding.    Watch closely for changes in your health, and be sure to contact your doctor if:    · You feel weaker or more tired than usual.     · You do not get better as expected. Where can you learn more? Go to http://ashlie-funmilayo.info/. Enter T544 in the search box to learn more about \"Iron Deficiency Anemia: Care Instructions. \"  Current as of: May 6, 2018  Content Version: 11.9  © 2481-4341 "[employer].  Care instructions adapted under license by [employer] (which disclaims liability or warranty for this information). If you have questions about a medical condition or this instruction, always ask your healthcare professional. [address] any warranty or liability for your use of this information. Breast Cancer: Care Instructions  Your Care Instructions    Breast cancer occurs when abnormal cells grow out of control in the breast. These cancer cells can spread within the breast, to nearby lymph nodes and other tissues, and to other parts of the body. Being treated for cancer can weaken your body, and you may feel very tired. Get the rest your body needs so you can feel better. Finding out that you have cancer is scary. You may feel many emotions and may need some help coping. Seek out family, friends, and counselors for support. You also can do things at home to make yourself feel better while you go through treatment. Call the [employer] ([phone]) or visit its website at [address] for more information. Follow-up care is a key part of your treatment and safety. Be sure to make and go to all appointments, and call your doctor if you are having problems. It's also a good idea to know your test results and keep a list of the medicines you take. How can you care for yourself at home? · Take your medicines exactly as prescribed. Call your doctor if you think you are having a problem with your medicine. You may get medicine for nausea and vomiting if you have these side effects. · Follow your doctor's instructions to relieve pain. Pain from cancer and surgery can almost always be controlled. Use pain medicine when you first notice pain, before it becomes severe. · Eat healthy food. If you do not feel like eating, try to eat food that has protein and extra calories to keep up your strength and prevent weight loss. Drink liquid meal replacements for extra calories and protein. Try to eat your main meal early.   · Get some physical activity every day, but do not get too tired. Keep doing the hobbies you enjoy as your energy allows. · Do not smoke. Smoking can make your cancer worse. If you need help quitting, talk to your doctor about stop-smoking programs and medicines. These can increase your chances of quitting for good. · Take steps to control your stress and workload. Learn relaxation techniques. ? Share your feelings. Stress and tension affect our emotions. By expressing your feelings to others, you may be able to understand and cope with them. ? Consider joining a support group. Talking about a problem with your spouse, a good friend, or other people with similar problems is a good way to reduce tension and stress. ? Express yourself through art. Try writing, crafts, dance, or art to relieve stress. Some dance, writing, or art groups may be available just for people who have cancer. ? Be kind to your body and mind. Getting enough sleep, eating a healthy diet, and taking time to do things you enjoy can contribute to an overall feeling of balance in your life and can help reduce stress. ? Get help if you need it. Discuss your concerns with your doctor or counselor. · If you are vomiting or have diarrhea:  ? Drink plenty of fluids (enough so that your urine is light yellow or clear like water) to prevent dehydration. Choose water and other caffeine-free clear liquids. If you have kidney, heart, or liver disease and have to limit fluids, talk with your doctor before you increase the amount of fluids you drink. ? When you are able to eat, try clear soups, mild foods, and liquids until all symptoms are gone for 12 to 48 hours. Other good choices include dry toast, crackers, cooked cereal, and gelatin dessert, such as Jell-O.  · If you have not already done so, prepare a list of advance directives.  Advance directives are instructions to your doctor and family members about what kind of care you want if you become unable to speak or express yourself. When should you call for help? Call 911 anytime you think you may need emergency care. For example, call if:    · You passed out (lost consciousness).    Call your doctor now or seek immediate medical care if:    · You have a fever.     · You have abnormal bleeding.     · You think you have an infection.     · You have new or worse pain.     · You have new symptoms, such as a cough, belly pain, vomiting, diarrhea, or a rash.    Watch closely for changes in your health, and be sure to contact your doctor if:    · You are much more tired than usual.     · You have swollen glands in your armpits, groin, or neck.     · You do not get better as expected. Where can you learn more? Go to http://ashlie-funmilayo.info/. Enter V321 in the search box to learn more about \"Breast Cancer: Care Instructions. \"  Current as of: March 27, 2018  Content Version: 11.9  © 1937-1576 [employer]. Care instructions adapted under license by [employer] (which disclaims liability or warranty for this information). If you have questions about a medical condition or this instruction, always ask your healthcare professional. [place] [address] any warranty or liability for your use of this information.

## 2019-03-11 NOTE — PROGRESS NOTES
Hematology/Oncology  Progress Note    Name: Reyes Sportsman  Date: 3/11/2019  : 1955    PCP: Freddie Gray MD     Ms. Maximo Ruffin is a 71 y.o. female who was seen for management of her invasive ductal adenocarcinoma of the left breast.    Current therapy: Femara 2.5mg PO daily. Subjective:     Mrs. Maximo Ruffin is a 41-year-old woman who has a history of invasive ductal adenocarcinoma involving the left breast.  She is doing well. She continues to take Femara 2.5mg PO daily and is tolerating the treatment reasonably well. She has no new complaints or concerns to report. Past medical history, family history, and social history: these were reviewed and remains unchanged.     Past Medical History:   Diagnosis Date    Anemia      Past Surgical History:   Procedure Laterality Date    BREAST SURGERY PROCEDURE UNLISTED      HX HYSTERECTOMY       Social History     Socioeconomic History    Marital status:      Spouse name: Not on file    Number of children: Not on file    Years of education: Not on file    Highest education level: Not on file   Social Needs    Financial resource strain: Not on file    Food insecurity - worry: Not on file    Food insecurity - inability: Not on file   [employer] needs - medical: Not on file   [employer] needs - non-medical: Not on file   Occupational History    Not on file   Tobacco Use    Smoking status: Never Smoker    Smokeless tobacco: Never Used   Substance and Sexual Activity    Alcohol use: No    Drug use: Not on file    Sexual activity: Not on file   Other Topics Concern    Not on file   Social History Narrative    Not on file     Family History   Problem Relation Age of Onset    Heart Disease Mother     Cancer Mother         colon    Diabetes Father     Cancer Brother         prostate    Alzheimer Paternal Uncle     Other Maternal Grandfather         old age    Cancer Brother         colon    Alzheimer Cousin      Current Outpatient Medications   Medication Sig Dispense Refill    cholecalciferol (VITAMIN D3) 1,000 unit tablet 1,000 Units.  Ferrous Fumarate 325 mg (106 mg iron) tab Take  by Mouth Once a Day.  docosahexanoic acid/epa (FISH OIL PO) Take  by Mouth Once a Day.  losartan (COZAAR) 100 mg tablet Take 1 Tab by Mouth Once a Day.  metFORMIN (GLUMETZA ER) 500 mg TG24 24 hour tablet Take 1 Tab by Mouth Once a Day.  multivitamin (ONE A DAY) tablet Take 1 Tab by Mouth Once a Day.  simvastatin (ZOCOR) 40 mg tablet 20 mg.      triamterene-hydroCHLOROthiazide (MAXZIDE) 75-50 mg per tablet       letrozole (FEMARA) 2.5 mg tablet TAKE ONE TABLET BY MOUTH ONCE DAILY 90 Tab 3       Review of Systems  Constitutional: The patient has no acute distress or discomfort. HEENT: The patient denies recent head trauma, eye pain, blurred vision,  hearing deficit, oropharyngeal mucosal pain or lesions, and the patient denies throat pain or discomfort. Lymphatics: The patient denies palpable peripheral lymphadenopathy. Hematologic: The patient denies having bruising, bleeding, or progressive fatigue. Respiratory: Patient denies having shortness of breath, cough, sputum production, fever, or dyspnea on exertion. Cardiovascular: The patient denies having leg pain, leg swelling, heart palpitations, chest permit, chest pain, or lightheadedness. The patient denies having dyspnea on exertion. Gastrointestinal: The patient denies having nausea, emesis, or diarrhea. The patient denies having any hematemesis or blood in the stool. Genitourinary: Patient denies having urinary urgency, frequency, or dysuria. The patient denies having blood in the urine. Psychological: The patient denies having symptoms of nervousness, anxiety, depression, or thoughts of harming self. Skin: Patient denies having skin rashes, skin, ulcerations, or unexplained itching or pruritus.   Musculoskeletal: The patient denies having pain in the joints or bones.      Objective:     Visit Vitals  /73   Pulse (!) 55   Temp 97.7 °F (36.5 °C) (Oral)   Resp 18   Ht 5' 9\" (1.753 m)   Wt 83.9 kg (185 lb)   SpO2 99%   BMI 27.32 kg/m²     ECOG PS=0; Pain score=0/10  Physical Exam:   Gen. Appearance: The patient is in no acute distress. Skin: There is no bruise or rash. HEENT: The exam is unremarkable. Neck: Supple without lymphadenopathy or thyromegaly. Lungs: Clear to auscultation and percussion; there are no wheezes or rhonchi. Heart: Regular rate and rhythm; there are no murmurs, gallops, or rubs. Anterior chest wall and breast: there is no eveidence of disease recurrence. Axilla reveals no palpable lymphadenopathy. Abdomen: Bowel sounds are present and normal.  There is no guarding, tenderness, or hepatosplenomegaly. Extremities: There is no clubbing, cyanosis, or edema. Neurologic: There are no focal neurologic deficits. Lymphatics: There is no palpable peripheral lymphadenopathy. Musculoskeletal: The patient has full range of motion at all joints. There is no evidence of joint deformity or effusions. There is no focal joint tenderness. Psychological/psychiatric: There is no clinical evidence of anxiety, depression, or melancholy. Lab data:      Results for orders placed or performed during the hospital encounter of 03/11/19   CBC WITH 3 PART DIFF     Status: Abnormal   Result Value Ref Range Status    WBC 5.0 4.5 - 13.0 K/uL Final    RBC 3.81 (L) 4.10 - 5.10 M/uL Final    HGB 11.4 (L) 12.0 - 16 g/dL Final    HCT 33.9 (L) 36 - 48 % Final    MCV 89.0 78 - 102 FL Final    MCH 29.9 25.0 - 35.0 PG Final    MCHC 33.6 31 - 37 g/dL Final    RDW 13.7 11.5 - 14.5 % Final    NEUTROPHILS 61 40 - 70 % Final    MIXED CELLS 9 0.1 - 17 % Final    LYMPHOCYTES 30 14 - 44 % Final    ABS. NEUTROPHILS 3.0 1.8 - 9.5 K/UL Final    ABS. MIXED CELLS 0.5 0.0 - 2.3 K/uL Final    ABS.  LYMPHOCYTES 1.5 1.1 - 5.9 K/UL Final     Comment: Test performed at [facility] [address] Oncology or Outpatient Infusion Center Location. Reviewed by Medical Director. DF AUTOMATED   Final           Assessment:     1. Malignant neoplasm of upper-inner quadrant of left breast in female, estrogen receptor positive ([address]    2. Iron deficiency anemia secondary to inadequate dietary iron intake      Plan:   Invasive ductal adenocarcinoma left breast: I have encouraged the patient to continue with her self breast exams. She will also continue with annual screening mammography. She is due for her screening  Mammogram on 3/14/2019. Femara 2.5mg PO daily will be continued. Her CA 27-29 on 12/10/18 was normal at 12.5U/mL. At this time I will check her CA-27-29 level. Functional iron deficiency anemia: I have explained to the patient that her CBC  showed a hemoglobin of 11.4g/dL with hematocrit 33.9%. She has not been taking the iron supplement on a regular basis. I advised her to continue  taking 1 tablet of Slow Fe daily. I will check her iron profile and ferritin levels at this time. Follow-up will occur in 3 months or sooner if indicated. Orders Placed This Encounter    COMPLETE CBC & AUTO DIFF WBC    METABOLIC PANEL, COMPREHENSIVE     Standing Status:   Future     Standing Expiration Date:   3/11/2020    IRON PROFILE     Standing Status:   Future     Standing Expiration Date:   3/11/2020    FERRITIN     Standing Status:   Future     Standing Expiration Date:   3/11/2020    CA 27.29     Standing Status:   Future     Standing Expiration Date:   3/11/2020    InHouse CBC ([facility])     Standing Status:   Future     Number of Occurrences:   1     Standing Expiration Date:   3/18/2019       Mary Lam NP  03/11/2019    I have assessed the patient independently and  agree with the full assessment as outlined.   Amisha Giles MD, Melani Rao

## 2019-03-12 LAB — CANCER AG27-29 SERPL-ACNC: 18.1 U/ML (ref 0–38.6)

## 2019-04-04 RX ORDER — LETROZOLE 2.5 MG/1
TABLET, FILM COATED ORAL
Qty: 90 TAB | Refills: 3 | Status: SHIPPED | OUTPATIENT
Start: 2019-04-04 | End: 2020-07-01

## 2019-04-04 RX ORDER — LETROZOLE 2.5 MG/1
TABLET, FILM COATED ORAL
Qty: 90 TAB | Refills: 3 | Status: CANCELLED | OUTPATIENT
Start: 2019-04-04

## 2019-04-08 RX ORDER — LETROZOLE 2.5 MG/1
TABLET, FILM COATED ORAL
Qty: 90 TAB | Refills: 3 | Status: SHIPPED | OUTPATIENT
Start: 2019-04-08 | End: 2020-03-06 | Stop reason: SDUPTHER

## 2019-07-22 ENCOUNTER — OFFICE VISIT (OUTPATIENT)
Dept: ONCOLOGY | Age: 64
End: 2019-07-22

## 2019-07-22 ENCOUNTER — HOSPITAL ENCOUNTER (OUTPATIENT)
Dept: ONCOLOGY | Age: 64
Discharge: HOME | End: 2019-07-22

## 2019-07-22 ENCOUNTER — HOSPITAL ENCOUNTER (OUTPATIENT)
Dept: LAB | Age: 64
Discharge: HOME | End: 2019-07-22
Payer: COMMERCIAL

## 2019-07-22 VITALS
SYSTOLIC BLOOD PRESSURE: 110 MMHG | RESPIRATION RATE: 12 BRPM | HEIGHT: 69 IN | DIASTOLIC BLOOD PRESSURE: 70 MMHG | OXYGEN SATURATION: 99 % | TEMPERATURE: 96 F | HEART RATE: 99 BPM | WEIGHT: 181.8 LBS | BODY MASS INDEX: 26.93 KG/M2

## 2019-07-22 DIAGNOSIS — Z79.811 LONG TERM (CURRENT) USE OF AROMATASE INHIBITORS: ICD-10-CM

## 2019-07-22 DIAGNOSIS — C50.212 MALIGNANT NEOPLASM OF UPPER-INNER QUADRANT OF LEFT BREAST IN FEMALE, ESTROGEN RECEPTOR POSITIVE (HCC): ICD-10-CM

## 2019-07-22 DIAGNOSIS — C50.212 MALIGNANT NEOPLASM OF UPPER-INNER QUADRANT OF LEFT BREAST IN FEMALE, ESTROGEN RECEPTOR POSITIVE (HCC): Primary | ICD-10-CM

## 2019-07-22 DIAGNOSIS — D50.8 IRON DEFICIENCY ANEMIA SECONDARY TO INADEQUATE DIETARY IRON INTAKE: ICD-10-CM

## 2019-07-22 DIAGNOSIS — Z17.0 MALIGNANT NEOPLASM OF UPPER-INNER QUADRANT OF LEFT BREAST IN FEMALE, ESTROGEN RECEPTOR POSITIVE (HCC): Primary | ICD-10-CM

## 2019-07-22 DIAGNOSIS — Z17.0 MALIGNANT NEOPLASM OF UPPER-INNER QUADRANT OF LEFT BREAST IN FEMALE, ESTROGEN RECEPTOR POSITIVE (HCC): ICD-10-CM

## 2019-07-22 LAB
ALBUMIN SERPL-MCNC: 4 G/DL (ref 3.4–5)
ALBUMIN/GLOB SERPL: 1.1 {RATIO} (ref 0.8–1.7)
ALP SERPL-CCNC: 90 U/L (ref 45–117)
ALT SERPL-CCNC: 14 U/L (ref 13–56)
ANION GAP SERPL CALC-SCNC: 3 MMOL/L (ref 3–18)
AST SERPL-CCNC: 11 U/L (ref 10–38)
BASO+EOS+MONOS # BLD AUTO: 0.6 K/UL (ref 0–2.3)
BASO+EOS+MONOS NFR BLD AUTO: 10 % (ref 0.1–17)
BILIRUB SERPL-MCNC: 0.3 MG/DL (ref 0.2–1)
BUN SERPL-MCNC: 19 MG/DL (ref 7–18)
BUN/CREAT SERPL: 22 (ref 12–20)
CALCIUM SERPL-MCNC: 9.5 MG/DL (ref 8.5–10.1)
CHLORIDE SERPL-SCNC: 102 MMOL/L (ref 100–111)
CO2 SERPL-SCNC: 33 MMOL/L (ref 21–32)
CREAT SERPL-MCNC: 0.88 MG/DL (ref 0.6–1.3)
DIFFERENTIAL METHOD BLD: ABNORMAL
ERYTHROCYTE [DISTWIDTH] IN BLOOD BY AUTOMATED COUNT: 13.6 % (ref 11.5–14.5)
FERRITIN SERPL-MCNC: 144 NG/ML (ref 8–388)
GLOBULIN SER CALC-MCNC: 3.7 G/DL (ref 2–4)
GLUCOSE SERPL-MCNC: 70 MG/DL (ref 74–99)
HCT VFR BLD AUTO: 32.8 % (ref 36–48)
HGB BLD-MCNC: 10.8 G/DL (ref 12–16)
IRON SATN MFR SERPL: 21 %
IRON SERPL-MCNC: 69 UG/DL (ref 50–175)
LYMPHOCYTES # BLD: 1.7 K/UL (ref 1.1–5.9)
LYMPHOCYTES NFR BLD: 28 % (ref 14–44)
MCH RBC QN AUTO: 29.8 PG (ref 25–35)
MCHC RBC AUTO-ENTMCNC: 32.9 G/DL (ref 31–37)
MCV RBC AUTO: 90.4 FL (ref 78–102)
NEUTS SEG # BLD: 3.6 K/UL (ref 1.8–9.5)
NEUTS SEG NFR BLD: 62 % (ref 40–70)
PLATELET # BLD AUTO: 190 K/UL (ref 140–440)
POTASSIUM SERPL-SCNC: 3.5 MMOL/L (ref 3.5–5.5)
PROT SERPL-MCNC: 7.7 G/DL (ref 6.4–8.2)
RBC # BLD AUTO: 3.63 M/UL (ref 4.1–5.1)
SODIUM SERPL-SCNC: 138 MMOL/L (ref 136–145)
TIBC SERPL-MCNC: 323 UG/DL (ref 250–450)
WBC # BLD AUTO: 5.9 K/UL (ref 4.5–13)

## 2019-07-22 PROCEDURE — 82728 ASSAY OF FERRITIN: CPT

## 2019-07-22 PROCEDURE — 36415 COLL VENOUS BLD VENIPUNCTURE: CPT

## 2019-07-22 PROCEDURE — 80053 COMPREHEN METABOLIC PANEL: CPT

## 2019-07-22 PROCEDURE — 83540 ASSAY OF IRON: CPT

## 2019-07-22 PROCEDURE — 86300 IMMUNOASSAY TUMOR CA 15-3: CPT

## 2019-07-22 RX ORDER — TERBINAFINE HYDROCHLORIDE 250 MG/1
TABLET ORAL
Refills: 0 | COMMUNITY
Start: 2019-06-19 | End: 2022-09-22

## 2019-07-22 RX ORDER — CICLOPIROX OLAMINE 7.7 MG/G
CREAM TOPICAL
Refills: 99 | COMMUNITY
Start: 2019-06-26

## 2019-07-22 NOTE — PROGRESS NOTES
Hematology/Oncology  Progress Note    Name: Lindsay Lechuga  Date: 2019  : 1955    PCP: Ira Silva MD     Ms. Dakotah Rider is a 71 y.o. female who was seen for management of her invasive ductal adenocarcinoma of the left breast.    Current therapy: Femara 2.5mg PO daily started May 2017    Subjective:     Mrs. Dakotah Rider is a 69-year-old woman who has a history of invasive ductal adenocarcinoma involving the left breast.  She is doing well. She continues to take Femara 2.5mg PO daily and is tolerating the treatment reasonably well. She has no new complaints or concerns to report. She is Up to date with her mammogram.      Past medical history, family history, and social history: these were reviewed and remains unchanged.     Past Medical History:   Diagnosis Date    Anemia      Past Surgical History:   Procedure Laterality Date    BREAST SURGERY PROCEDURE UNLISTED      HX HYSTERECTOMY       Social History     Socioeconomic History    Marital status:      Spouse name: Not on file    Number of children: Not on file    Years of education: Not on file    Highest education level: Not on file   Occupational History    Not on file   Social Needs    Financial resource strain: Not on file    Food insecurity:     Worry: Not on file     Inability: Not on file    Transportation needs:     Medical: Not on file     Non-medical: Not on file   Tobacco Use    Smoking status: Never Smoker    Smokeless tobacco: Never Used   Substance and Sexual Activity    Alcohol use: No    Drug use: Not on file    Sexual activity: Not on file   Lifestyle    Physical activity:     Days per week: Not on file     Minutes per session: Not on file    Stress: Not on file   Relationships    Social connections:     Talks on phone: Not on file     Gets together: Not on file     Attends Jain service: Not on file     Active member of club or organization: Not on file     Attends meetings of clubs or organizations: Not on file     Relationship status: Not on file    Intimate partner violence:     Fear of current or ex partner: Not on file     Emotionally abused: Not on file     Physically abused: Not on file     Forced sexual activity: Not on file   Other Topics Concern    Not on file   Social History Narrative    Not on file     Family History   Problem Relation Age of Onset    Heart Disease Mother    Echo Fine Mother         colon    Diabetes Father     Cancer Brother         prostate    Alzheimer Paternal Uncle     Other Maternal Grandfather         old age   [facility] Cancer Brother         colon    Alzheimer Cousin      Current Outpatient Medications   Medication Sig Dispense Refill    ciclopirox (LOPROX) 0.77 % topical cream APPLY CREAM TO SOLES AND SIDES OF FEET TWICE DAILY  99    terbinafine HCl (LAMISIL) 250 mg tablet TAKE 1 TABLET BY MOUTH ONCE DAILY  0    letrozole (FEMARA) 2.5 mg tablet TAKE 1 TABLET BY MOUTH ONCE DAILY 90 Tab 3    letrozole (FEMARA) 2.5 mg tablet TAKE ONE TABLET BY MOUTH ONCE DAILY 90 Tab 3    cholecalciferol (VITAMIN D3) 1,000 unit tablet 1,000 Units.  Ferrous Fumarate 325 mg (106 mg iron) tab Take  by Mouth Once a Day.  docosahexanoic acid/epa (FISH OIL PO) Take  by Mouth Once a Day.  losartan (COZAAR) 100 mg tablet Take 1 Tab by Mouth Once a Day.  metFORMIN (GLUMETZA ER) 500 mg TG24 24 hour tablet Take 1 Tab by Mouth Once a Day.  multivitamin (ONE A DAY) tablet Take 1 Tab by Mouth Once a Day.  simvastatin (ZOCOR) 40 mg tablet 20 mg.      triamterene-hydroCHLOROthiazide (MAXZIDE) 75-50 mg per tablet          Review of Systems  Constitutional: The patient has no acute distress or discomfort. HEENT: The patient denies recent head trauma, eye pain, blurred vision,  hearing deficit, oropharyngeal mucosal pain or lesions, and the patient denies throat pain or discomfort. Lymphatics: The patient denies palpable peripheral lymphadenopathy.   Hematologic: The patient denies having bruising, bleeding, or progressive fatigue. Respiratory: Patient denies having shortness of breath, cough, sputum production, fever, or dyspnea on exertion. Cardiovascular: The patient denies having leg pain, leg swelling, heart palpitations, chest permit, chest pain, or lightheadedness. The patient denies having dyspnea on exertion. Gastrointestinal: The patient denies having nausea, emesis, or diarrhea. The patient denies having any hematemesis or blood in the stool. Genitourinary: Patient denies having urinary urgency, frequency, or dysuria. The patient denies having blood in the urine. Psychological: The patient denies having symptoms of nervousness, anxiety, depression, or thoughts of harming self. Skin: Patient denies having skin rashes, skin, ulcerations, or unexplained itching or pruritus. Musculoskeletal: The patient denies having pain in the joints or bones. Objective:     Visit Vitals  /70   Pulse 99   Temp 96 °F (35.6 °C) (Oral)   Resp 12   Ht 5' 9\" (1.753 m)   Wt 82.5 kg (181 lb 12.8 oz)   SpO2 99%   BMI 26.85 kg/m²     ECOG PS=0; Pain score=0/10  Physical Exam:   Gen. Appearance: The patient is in no acute distress. Skin: There is no bruise or rash. HEENT: The exam is unremarkable. Neck: Supple without lymphadenopathy or thyromegaly. Lungs: Clear to auscultation and percussion; there are no wheezes or rhonchi. Heart: Regular rate and rhythm; there are no murmurs, gallops, or rubs. Anterior chest wall and breast: there is no eveidence of disease recurrence. Axilla reveals no palpable lymphadenopathy. Abdomen: Bowel sounds are present and normal.  There is no guarding, tenderness, or hepatosplenomegaly. Extremities: There is no clubbing, cyanosis, or edema. Neurologic: There are no focal neurologic deficits. Lymphatics: There is no palpable peripheral lymphadenopathy. Musculoskeletal: The patient has full range of motion at all joints.   There is no evidence of joint deformity or effusions. There is no focal joint tenderness. Psychological/psychiatric: There is no clinical evidence of anxiety, depression, or melancholy. Lab data:      Results for orders placed or performed during the hospital encounter of 07/22/19   CBC WITH 3 PART DIFF     Status: Abnormal   Result Value Ref Range Status    WBC 5.9 4.5 - 13.0 K/uL Final    RBC 3.63 (L) 4.10 - 5.10 M/uL Final    HGB 10.8 (L) 12.0 - 16 g/dL Final    HCT 32.8 (L) 36 - 48 % Final    MCV 90.4 78 - 102 FL Final    MCH 29.8 25.0 - 35.0 PG Final    MCHC 32.9 31 - 37 g/dL Final    RDW 13.6 11.5 - 14.5 % Final    PLATELET 745 253 - 956 K/uL Final    NEUTROPHILS 62 40 - 70 % Final    MIXED CELLS 10 0.1 - 17 % Final    LYMPHOCYTES 28 14 - 44 % Final    ABS. NEUTROPHILS 3.6 1.8 - 9.5 K/UL Final    ABS. MIXED CELLS 0.6 0.0 - 2.3 K/uL Final    ABS. LYMPHOCYTES 1.7 1.1 - 5.9 K/UL Final     Comment: Test performed at [address] [place] [address] or Outpatient Infusion Center Location. Reviewed by Medical Director. DF AUTOMATED   Final           Assessment:     1. Malignant neoplasm of upper-inner quadrant of left breast in female, estrogen receptor positive ([address]    2. Iron deficiency anemia secondary to inadequate dietary iron intake    3. Long term (current) use of aromatase inhibitors      Plan:   Invasive ductal adenocarcinoma left breast/long-term current use of aromatase inhibitors: I have encouraged the patient to continue with her self breast exams. She will also continue with annual screening mammography. Recent mammogram on was on 3/18/2019. Femara 2.5mg PO daily will be continued. Her CA 27-29 on 03/11/2019 was normal at 18.1 U/mL. At this time I will check her CA-27-29 level. Functional iron deficiency anemia: I have explained to the patient that her CBC  showed a hemoglobin of 10.8 g/dL with hematocrit 32.8%. She has not been taking the iron supplement on a regular basis.  I advised her to continue  taking 1 tablet of Slow Fe daily. I will check her iron profile and ferritin levels at this time. Follow-up will occur in 4 months or sooner if indicated.     Orders Placed This Encounter    COMPLETE CBC & AUTO DIFF WBC    InHouse CBC ([employer])     Standing Status:   Future     Number of Occurrences:   1     Standing Expiration Date:   6/26/3248    METABOLIC PANEL, COMPREHENSIVE     Standing Status:   Future     Number of Occurrences:   1     Standing Expiration Date:   7/22/2020    CA 27.29     Standing Status:   Future     Number of Occurrences:   1     Standing Expiration Date:   7/22/2020    FERRITIN     Standing Status:   Future     Number of Occurrences:   1     Standing Expiration Date:   7/22/2020    IRON PROFILE     Standing Status:   Future     Number of Occurrences:   1     Standing Expiration Date:   7/22/2020    ciclopirox (LOPROX) 0.77 % topical cream     Sig: APPLY CREAM TO SOLES AND SIDES OF FEET TWICE DAILY     Refill:  99    terbinafine HCl (LAMISIL) 250 mg tablet     Sig: TAKE 1 TABLET BY MOUTH ONCE DAILY     Refill:  0       Becca Jerry MD  07/22/2019

## 2019-07-22 NOTE — PROGRESS NOTES
ROOM # 3    Yuli Chanel presents today for   Chief Complaint   Patient presents with    Anemia    Breast Problem       Yuli Chanel preferred language for health care discussion is english/other. Is someone accompanying this pt? no    Is the patient using any DME equipment during [address]? no    Depression Screening:  3 most recent PHQ Screens 7/22/2019 9/10/2018 6/4/2018   Little interest or pleasure in doing things Not at all Not at all Not at all   Feeling down, depressed, irritable, or hopeless Not at all Not at all Not at all   Total Score PHQ 2 0 0 0       Learning Assessment:  No flowsheet data found. Abuse Screening:  No flowsheet data found. Fall Risk  Fall Risk Assessment, last 12 mths 7/22/2019   Able to walk? Yes   Fall in past 12 months? No       Health Maintenance reviewed and discussed per provider. Yes    Yuli Chanel is due for   Health Maintenance Due   Topic Date Due    Hepatitis C Screening  1955    DTaP/Tdap/Td series (1 - Tdap) 02/14/1975    Shingrix Vaccine Age 50> (1 of 2) 02/14/2004    FOBT Q 1 YEAR AGE 50-75  02/14/2004    BREAST CANCER SCRN MAMMOGRAM  03/25/2018    GLAUCOMA SCREENING Q2Y  02/14/2019    Bone Densitometry (Dexa) Screening  02/14/2019    Pneumococcal 65+ years (1 of 2 - PCV13) 02/14/2019         Please order/place referral if appropriate. Advance Directive:  1. Do you have an advance directive in place? Patient Reply: no    2. If not, would you like material regarding how to put one in place? Patient Reply: no    Coordination of Care:  1. Have you been to the ER, urgent care clinic since your last visit? Hospitalized since your last visit? no    2. Have you seen or consulted any other health care providers outside of the [address] [place] [address] since your last visit? Include any pap smears or colon screening.  no

## 2019-07-22 NOTE — PATIENT INSTRUCTIONS
Breast Cancer: Care Instructions  Your Care Instructions    Breast cancer occurs when abnormal cells grow out of control in the breast. These cancer cells can spread within the breast, to nearby lymph nodes and other tissues, and to other parts of the body. Being treated for cancer can weaken your body, and you may feel very tired. Get the rest your body needs so you can feel better. Finding out that you have cancer is scary. You may feel many emotions and may need some help coping. Seek out family, friends, and counselors for support. You also can do things at home to make yourself feel better while you go through treatment. Call the [employer] (1-969.240.4203) or visit its website at [facility] for more information. Follow-up care is a key part of your treatment and safety. Be sure to make and go to all appointments, and call your doctor if you are having problems. It's also a good idea to know your test results and keep a list of the medicines you take. How can you care for yourself at home? · Take your medicines exactly as prescribed. Call your doctor if you think you are having a problem with your medicine. You may get medicine for nausea and vomiting if you have these side effects. · Follow your doctor's instructions to relieve pain. Pain from cancer and surgery can almost always be controlled. Use pain medicine when you first notice pain, before it becomes severe. · Eat healthy food. If you do not feel like eating, try to eat food that has protein and extra calories to keep up your strength and prevent weight loss. Drink liquid meal replacements for extra calories and protein. Try to eat your main meal early. · Get some physical activity every day, but do not get too tired. Keep doing the hobbies you enjoy as your energy allows. · Do not smoke. Smoking can make your cancer worse. If you need help quitting, talk to your doctor about stop-smoking programs and medicines.  These can increase your chances of quitting for good. · Take steps to control your stress and workload. Learn relaxation techniques. ? Share your feelings. Stress and tension affect our emotions. By expressing your feelings to others, you may be able to understand and cope with them. ? Consider joining a support group. Talking about a problem with your spouse, a good friend, or other people with similar problems is a good way to reduce tension and stress. ? Express yourself through art. Try writing, crafts, dance, or art to relieve stress. Some dance, writing, or art groups may be available just for people who have cancer. ? Be kind to your body and mind. Getting enough sleep, eating a healthy diet, and taking time to do things you enjoy can contribute to an overall feeling of balance in your life and can help reduce stress. ? Get help if you need it. Discuss your concerns with your doctor or counselor. · If you are vomiting or have diarrhea:  ? Drink plenty of fluids (enough so that your urine is light yellow or clear like water) to prevent dehydration. Choose water and other caffeine-free clear liquids. If you have kidney, heart, or liver disease and have to limit fluids, talk with your doctor before you increase the amount of fluids you drink. ? When you are able to eat, try clear soups, mild foods, and liquids until all symptoms are gone for 12 to 48 hours. Other good choices include dry toast, crackers, cooked cereal, and gelatin dessert, such as Jell-O.  · If you have not already done so, prepare a list of advance directives. Advance directives are instructions to your doctor and family members about what kind of care you want if you become unable to speak or express yourself. When should you call for help? Call 911 anytime you think you may need emergency care.  For example, call if:    · You passed out (lost consciousness).    Call your doctor now or seek immediate medical care if:    · You have a fever.     · You have abnormal bleeding.     · You think you have an infection.     · You have new or worse pain.     · You have new symptoms, such as a cough, belly pain, vomiting, diarrhea, or a rash.    Watch closely for changes in your health, and be sure to contact your doctor if:    · You are much more tired than usual.     · You have swollen glands in your armpits, groin, or neck.     · You do not get better as expected. Where can you learn more? Go to http://ashlie-funmilayo.info/. Enter V321 in the search box to learn more about \"Breast Cancer: Care Instructions. \"  Current as of: December 19, 2018  Content Version: 12.1  © 6019-1893 [employer]. Care instructions adapted under license by [employer] (which disclaims liability or warranty for this information). If you have questions about a medical condition or this instruction, always ask your healthcare professional. [address] any warranty or liability for your use of this information. Letrozole (By mouth)   Letrozole (LET-julio cesar-zole)  Treats breast cancer. Brand Name(s): Yary Bales Femara Co-Pack   There may be other brand names for this medicine. When This Medicine Should Not Be Used: This medicine is not right for everyone. Do not use it if you had an allergic reaction to letrozole, or if you are pregnant. How to Use This Medicine:   Tablet  · Your doctor will tell you how much medicine to use. Do not use more than directed. · Missed dose: This medicine needs to be given on a fixed schedule. If you miss a dose, call your doctor for instructions. · Store the medicine in a closed container at room temperature, away from heat, moisture, and direct light. Drugs and Foods to Avoid:   Ask your doctor or pharmacist before using any other medicine, including over-the-counter medicines, vitamins, and herbal products. · Some medicines can affect how letrozole works.  Tell your doctor if you are also using tamoxifen. Warnings While Using This Medicine:   · It is not safe to take this medicine during pregnancy. It could harm an unborn baby. Tell your doctor right away if you become pregnant. Use an effective form of birth control during treatment with this medicine and for at least 3 weeks after the last dose. · Do not breastfeed while you are taking this medicine and for at least 3 weeks after your last dose. · Tell your doctor if you have liver disease (including cirrhosis), bone problems (including osteoporosis), or high cholesterol in the blood. · This medicine may cause the following problems:  ¨ Low bone mineral density  ¨ High cholesterol or fat levels in the blood  ¨ Liver problems  · This medicine may make you dizzy, drowsy, or tired. Do not drive or do anything else that could be dangerous until you know how this medicine affects you. · This medicine could cause infertility. Talk with your doctor before using this medicine if you plan to have children. · Medicines used to treat cancer are very strong and can have many side effects. Before receiving this medicine, make sure you understand all the risks and benefits. It is important for you to work closely with your doctor during your treatment. · Your doctor will do lab tests at regular visits to check on the effects of this medicine. Keep all appointments. · Keep all medicine out of the reach of children. Never share your medicine with anyone.   Possible Side Effects While Using This Medicine:   Call your doctor right away if you notice any of these side effects:  · Allergic reaction: Itching or hives, swelling in your face or hands, swelling or tingling in your mouth or throat, chest tightness, trouble breathing  · Bone pain  · Chest pain, trouble breathing, coughing up blood  · Dark urine, pale stools, nausea, vomiting, loss of appetite, stomach pain, yellow skin or eyes  · Numbness or weakness on one side of your body, sudden or severe headache, problems with vision, speech, or walking  · Pain in your lower leg (calf)  · Swelling in your ankles or feet  · Unusual bleeding or bruising  · Unusual tiredness or weakness  If you notice these less serious side effects, talk with your doctor:   · Breast pain  · Diarrhea, constipation, stomach pain  · Headache  · Increased sweating  · Mild joint, back, or muscle pain  · Trouble sleeping  · Vaginal bleeding  · Warmth or redness in your face, neck, arms, or upper chest  · Weight gain or loss  If you notice other side effects that you think are caused by this medicine, tell your doctor. Call your doctor for medical advice about side effects. You may report side effects to FDA at 1-673-FDA-2627  © 2017 [facility] Information is for End User's use only and may not be sold, redistributed or otherwise used for commercial purposes. The above information is an  only. It is not intended as medical advice for individual conditions or treatments. Talk to your doctor, nurse or pharmacist before following any medical regimen to see if it is safe and effective for you.

## 2019-07-24 LAB — CANCER AG27-29 SERPL-ACNC: 14.7 U/ML (ref 0–38.6)

## 2019-09-09 ENCOUNTER — HOSPITAL ENCOUNTER (OUTPATIENT)
Dept: GENERAL RADIOLOGY | Age: 64
Discharge: HOME | End: 2019-09-09
Payer: MEDICARE

## 2019-09-09 DIAGNOSIS — R07.81 RIB PAIN: ICD-10-CM

## 2019-09-09 PROCEDURE — 71100 X-RAY EXAM RIBS UNI 2 VIEWS: CPT

## 2019-10-28 ENCOUNTER — HOSPITAL ENCOUNTER (OUTPATIENT)
Dept: LAB | Age: 64
Discharge: HOME | End: 2019-10-28
Payer: MEDICARE

## 2019-10-28 ENCOUNTER — OFFICE VISIT (OUTPATIENT)
Dept: ONCOLOGY | Age: 64
End: 2019-10-28

## 2019-10-28 VITALS
HEART RATE: 59 BPM | BODY MASS INDEX: 26.66 KG/M2 | WEIGHT: 180 LBS | DIASTOLIC BLOOD PRESSURE: 75 MMHG | SYSTOLIC BLOOD PRESSURE: 145 MMHG | RESPIRATION RATE: 18 BRPM | HEIGHT: 69 IN

## 2019-10-28 DIAGNOSIS — C50.212 MALIGNANT NEOPLASM OF UPPER-INNER QUADRANT OF LEFT BREAST IN FEMALE, ESTROGEN RECEPTOR POSITIVE (HCC): ICD-10-CM

## 2019-10-28 DIAGNOSIS — Z17.0 MALIGNANT NEOPLASM OF UPPER-INNER QUADRANT OF LEFT BREAST IN FEMALE, ESTROGEN RECEPTOR POSITIVE (HCC): ICD-10-CM

## 2019-10-28 DIAGNOSIS — Z79.811 LONG TERM (CURRENT) USE OF AROMATASE INHIBITORS: ICD-10-CM

## 2019-10-28 DIAGNOSIS — C50.212 MALIGNANT NEOPLASM OF UPPER-INNER QUADRANT OF LEFT BREAST IN FEMALE, ESTROGEN RECEPTOR POSITIVE (HCC): Primary | ICD-10-CM

## 2019-10-28 DIAGNOSIS — D50.8 IRON DEFICIENCY ANEMIA SECONDARY TO INADEQUATE DIETARY IRON INTAKE: ICD-10-CM

## 2019-10-28 DIAGNOSIS — Z17.0 MALIGNANT NEOPLASM OF UPPER-INNER QUADRANT OF LEFT BREAST IN FEMALE, ESTROGEN RECEPTOR POSITIVE (HCC): Primary | ICD-10-CM

## 2019-10-28 LAB
ALBUMIN SERPL-MCNC: 4.1 G/DL (ref 3.4–5)
ALBUMIN/GLOB SERPL: 1.1 {RATIO} (ref 0.8–1.7)
ALP SERPL-CCNC: 94 U/L (ref 45–117)
ALT SERPL-CCNC: 13 U/L (ref 13–56)
ANION GAP SERPL CALC-SCNC: 5 MMOL/L (ref 3–18)
AST SERPL-CCNC: 14 U/L (ref 10–38)
BASOPHILS # BLD: 0 K/UL (ref 0–0.1)
BASOPHILS NFR BLD: 0 % (ref 0–2)
BILIRUB SERPL-MCNC: 0.5 MG/DL (ref 0.2–1)
BUN SERPL-MCNC: 19 MG/DL (ref 7–18)
BUN/CREAT SERPL: 21 (ref 12–20)
CALCIUM SERPL-MCNC: 9.6 MG/DL (ref 8.5–10.1)
CHLORIDE SERPL-SCNC: 100 MMOL/L (ref 100–111)
CO2 SERPL-SCNC: 32 MMOL/L (ref 21–32)
CREAT SERPL-MCNC: 0.92 MG/DL (ref 0.6–1.3)
DIFFERENTIAL METHOD BLD: ABNORMAL
EOSINOPHIL # BLD: 0.1 K/UL (ref 0–0.4)
EOSINOPHIL NFR BLD: 2 % (ref 0–5)
ERYTHROCYTE [DISTWIDTH] IN BLOOD BY AUTOMATED COUNT: 14.7 % (ref 11.6–14.5)
FERRITIN SERPL-MCNC: 53 NG/ML (ref 8–388)
GLOBULIN SER CALC-MCNC: 3.6 G/DL (ref 2–4)
GLUCOSE SERPL-MCNC: 88 MG/DL (ref 74–99)
HCT VFR BLD AUTO: 33.2 % (ref 35–45)
HGB BLD-MCNC: 10.7 G/DL (ref 12–16)
IRON SATN MFR SERPL: 18 %
IRON SERPL-MCNC: 53 UG/DL (ref 50–175)
LYMPHOCYTES # BLD: 1.3 K/UL (ref 0.9–3.6)
LYMPHOCYTES NFR BLD: 35 % (ref 21–52)
MCH RBC QN AUTO: 29.6 PG (ref 24–34)
MCHC RBC AUTO-ENTMCNC: 32.2 G/DL (ref 31–37)
MCV RBC AUTO: 91.7 FL (ref 74–97)
MONOCYTES # BLD: 0.4 K/UL (ref 0.05–1.2)
MONOCYTES NFR BLD: 10 % (ref 3–10)
NEUTS SEG # BLD: 2 K/UL (ref 1.8–8)
NEUTS SEG NFR BLD: 53 % (ref 40–73)
PLATELET # BLD AUTO: 231 K/UL (ref 135–420)
PMV BLD AUTO: 12.2 FL (ref 9.2–11.8)
POTASSIUM SERPL-SCNC: 3.8 MMOL/L (ref 3.5–5.5)
PROT SERPL-MCNC: 7.7 G/DL (ref 6.4–8.2)
RBC # BLD AUTO: 3.62 M/UL (ref 4.2–5.3)
SODIUM SERPL-SCNC: 137 MMOL/L (ref 136–145)
TIBC SERPL-MCNC: 289 UG/DL (ref 250–450)
WBC # BLD AUTO: 3.8 K/UL (ref 4.6–13.2)

## 2019-10-28 PROCEDURE — 82728 ASSAY OF FERRITIN: CPT

## 2019-10-28 PROCEDURE — 86300 IMMUNOASSAY TUMOR CA 15-3: CPT

## 2019-10-28 PROCEDURE — 36415 COLL VENOUS BLD VENIPUNCTURE: CPT

## 2019-10-28 PROCEDURE — 83540 ASSAY OF IRON: CPT

## 2019-10-28 PROCEDURE — 85025 COMPLETE CBC W/AUTO DIFF WBC: CPT

## 2019-10-28 PROCEDURE — 80053 COMPREHEN METABOLIC PANEL: CPT

## 2019-10-28 NOTE — PROGRESS NOTES
Hematology/Oncology  Progress Note    Name: Amber Reis  Date: 10/28/2019  : 1955    PCP: Navjot Huerta MD     Ms. Juan M Reaves is a 71 y.o. female who was seen for management of her invasive ductal adenocarcinoma of the left breast.    Current therapy: Femara 2.5mg PO daily started May 2017    Subjective:     Mrs. Juan M Reaves is a 69-year-old woman who has a history of invasive ductal adenocarcinoma involving the left breast.  She is doing well. She continues to take Femara 2.5mg PO daily and is tolerating the treatment reasonably well. She has no new complaints or concerns to report. She is Up to date with her mammogram.      Past medical history, family history, and social history: these were reviewed and remains unchanged.     Past Medical History:   Diagnosis Date    Anemia      Past Surgical History:   Procedure Laterality Date    BREAST SURGERY PROCEDURE UNLISTED      HX HYSTERECTOMY       Social History     Socioeconomic History    Marital status:      Spouse name: Not on file    Number of children: Not on file    Years of education: Not on file    Highest education level: Not on file   Occupational History    Not on file   Social Needs    Financial resource strain: Not on file    Food insecurity:     Worry: Not on file     Inability: Not on file    Transportation needs:     Medical: Not on file     Non-medical: Not on file   Tobacco Use    Smoking status: Never Smoker    Smokeless tobacco: Never Used   Substance and Sexual Activity    Alcohol use: No    Drug use: Not on file    Sexual activity: Not on file   Lifestyle    Physical activity:     Days per week: Not on file     Minutes per session: Not on file    Stress: Not on file   Relationships    Social connections:     Talks on phone: Not on file     Gets together: Not on file     Attends Sikhism service: Not on file     Active member of club or organization: Not on file     Attends meetings of clubs or organizations: Not on file     Relationship status: Not on file    Intimate partner violence:     Fear of current or ex partner: Not on file     Emotionally abused: Not on file     Physically abused: Not on file     Forced sexual activity: Not on file   Other Topics Concern    Not on file   Social History Narrative    Not on file     Family History   Problem Relation Age of Onset    Heart Disease Mother    Mayra Chauhan Mother         colon    Diabetes Father     Cancer Brother         prostate    Alzheimer Paternal Uncle     Other Maternal Grandfather         old age   Teresa Horn Cancer Brother         colon    Alzheimer Cousin      Current Outpatient Medications   Medication Sig Dispense Refill    ciclopirox (LOPROX) 0.77 % topical cream APPLY CREAM TO SOLES AND SIDES OF FEET TWICE DAILY  99    terbinafine HCl (LAMISIL) 250 mg tablet TAKE 1 TABLET BY MOUTH ONCE DAILY  0    letrozole (FEMARA) 2.5 mg tablet TAKE 1 TABLET BY MOUTH ONCE DAILY 90 Tab 3    letrozole (FEMARA) 2.5 mg tablet TAKE ONE TABLET BY MOUTH ONCE DAILY 90 Tab 3    cholecalciferol (VITAMIN D3) 1,000 unit tablet 1,000 Units.  Ferrous Fumarate 325 mg (106 mg iron) tab Take  by Mouth Once a Day.  docosahexanoic acid/epa (FISH OIL PO) Take  by Mouth Once a Day.  losartan (COZAAR) 100 mg tablet Take 1 Tab by Mouth Once a Day.  metFORMIN (GLUMETZA ER) 500 mg TG24 24 hour tablet Take 1 Tab by Mouth Once a Day.  multivitamin (ONE A DAY) tablet Take 1 Tab by Mouth Once a Day.  simvastatin (ZOCOR) 40 mg tablet 20 mg.      triamterene-hydroCHLOROthiazide (MAXZIDE) 75-50 mg per tablet          Review of Systems  Constitutional: The patient has no acute distress or discomfort. HEENT: The patient denies recent head trauma, eye pain, blurred vision,  hearing deficit, oropharyngeal mucosal pain or lesions, and the patient denies throat pain or discomfort. Lymphatics: The patient denies palpable peripheral lymphadenopathy.   Hematologic: The patient denies having bruising, bleeding, or progressive fatigue. Respiratory: Patient denies having shortness of breath, cough, sputum production, fever, or dyspnea on exertion. Cardiovascular: The patient denies having leg pain, leg swelling, heart palpitations, chest permit, chest pain, or lightheadedness. The patient denies having dyspnea on exertion. Gastrointestinal: The patient denies having nausea, emesis, or diarrhea. The patient denies having any hematemesis or blood in the stool. Genitourinary: Patient denies having urinary urgency, frequency, or dysuria. The patient denies having blood in the urine. Psychological: The patient denies having symptoms of nervousness, anxiety, depression, or thoughts of harming self. Skin: Patient denies having skin rashes, skin, ulcerations, or unexplained itching or pruritus. Musculoskeletal: The patient denies having pain in the joints or bones. Objective:     Visit Vitals  /75   Pulse (!) 59   Resp 18   Ht 5' 9\" (1.753 m)   Wt 81.6 kg (180 lb)   BMI 26.58 kg/m²     ECOG PS=0; Pain score=0/10  Physical Exam:   Gen. Appearance: The patient is in no acute distress. Skin: There is no bruise or rash. HEENT: The exam is unremarkable. Neck: Supple without lymphadenopathy or thyromegaly. Lungs: Clear to auscultation and percussion; there are no wheezes or rhonchi. Heart: Regular rate and rhythm; there are no murmurs, gallops, or rubs. Anterior chest wall and breast: there is no eveidence of disease recurrence. Axilla reveals no palpable lymphadenopathy. Abdomen: Bowel sounds are present and normal.  There is no guarding, tenderness, or hepatosplenomegaly. Extremities: There is no clubbing, cyanosis, or edema. Neurologic: There are no focal neurologic deficits. Lymphatics: There is no palpable peripheral lymphadenopathy. Musculoskeletal: The patient has full range of motion at all joints. There is no evidence of joint deformity or effusions.   There is no focal joint tenderness. Psychological/psychiatric: There is no clinical evidence of anxiety, depression, or melancholy. Lab data:      Results for orders placed or performed during the hospital encounter of 07/22/19   CBC WITH 3 PART DIFF     Status: Abnormal   Result Value Ref Range Status    WBC 5.9 4.5 - 13.0 K/uL Final    RBC 3.63 (L) 4.10 - 5.10 M/uL Final    HGB 10.8 (L) 12.0 - 16 g/dL Final    HCT 32.8 (L) 36 - 48 % Final    MCV 90.4 78 - 102 FL Final    MCH 29.8 25.0 - 35.0 PG Final    MCHC 32.9 31 - 37 g/dL Final    RDW 13.6 11.5 - 14.5 % Final    PLATELET 302 061 - 441 K/uL Final    NEUTROPHILS 62 40 - 70 % Final    MIXED CELLS 10 0.1 - 17 % Final    LYMPHOCYTES 28 14 - 44 % Final    ABS. NEUTROPHILS 3.6 1.8 - 9.5 K/UL Final    ABS. MIXED CELLS 0.6 0.0 - 2.3 K/uL Final    ABS. LYMPHOCYTES 1.7 1.1 - 5.9 K/UL Final     Comment: Test performed at [address] [place] [address] or Outpatient Infusion Center Location. Reviewed by Medical Director. DF AUTOMATED   Final           Assessment:     1. Malignant neoplasm of upper-inner quadrant of left breast in female, estrogen receptor positive ([facility] [address]    2. Long term (current) use of aromatase inhibitors    3. Iron deficiency anemia secondary to inadequate dietary iron intake      Plan:   Invasive ductal adenocarcinoma left breast/long-term current use of aromatase inhibitors: I have encouraged the patient to continue with her self breast exams. She will also continue with annual screening mammography. Recent mammogram on was on 3/18/2019. Femara 2.5mg PO daily will be continued. Her CA 27-29 on 7/24/2019 was 14.7 u/mL. At this time I will check her CA-27-29 level. Functional iron deficiency anemia: I have explained to the patient that her CBC  showed a hemoglobin of 10.8 g/dL with hematocrit 32.8%, from blood that was drawn on 7/24/2019. The CBC from today is pending.   She has not been taking the iron supplement on a regular basis. I advised her to continue  taking 1 tablet of Slow Fe daily. I will check her iron profile and ferritin levels at this time. Follow-up will occur in 4 months or sooner if indicated.     Orders Placed This Encounter    CBC WITH AUTOMATED DIFF     Standing Status:   Future     Standing Expiration Date:   74/93/1578    METABOLIC PANEL, COMPREHENSIVE     Standing Status:   Future     Standing Expiration Date:   10/28/2020    IRON PROFILE     Standing Status:   Future     Standing Expiration Date:   10/28/2020    FERRITIN     Standing Status:   Future     Standing Expiration Date:   10/28/2020    CA 27.29     Standing Status:   Future     Standing Expiration Date:   10/28/2020       Aleks Kuo MD  07/22/2019

## 2019-10-28 NOTE — PATIENT INSTRUCTIONS
Iron Deficiency Anemia: Care Instructions  Your Care Instructions    Anemia means that you do not have enough red blood cells. Red blood cells carry oxygen around your body. When you have anemia, it can make you pale, weak, and tired. Many things can cause anemia. The most common cause is loss of blood. This can happen if you have heavy menstrual periods. It can also happen if you have bleeding in your stomach or bowel. You can also get anemia if you don't have enough iron in your diet or if it's hard for your body to absorb iron. In some cases, pregnancy causes anemia. That's because a pregnant woman needs more iron. Your doctor may do more tests to find the cause of your anemia. If a disease or other health problem is causing it, your doctor will treat that problem. It's important to follow up with your doctor to make sure that your iron level returns to normal.  Follow-up care is a key part of your treatment and safety. Be sure to make and go to all appointments, and call your doctor if you are having problems. It's also a good idea to know your test results and keep a list of the medicines you take. How can you care for yourself at home? · If your doctor recommended iron pills, take them as directed. ? Try to take the pills on an empty stomach. You can do this about 1 hour before or 2 hours after meals. But you may need to take iron with food to avoid an upset stomach. ? Do not take antacids or drink milk or anything with caffeine within 2 hours of when you take your iron. They can keep your body from absorbing the iron well. ? Vitamin C helps your body absorb iron. You may want to take iron pills with a glass of orange juice or some other food high in vitamin C.  ? Iron pills may cause stomach problems. These include heartburn, nausea, diarrhea, constipation, and cramps. It can help to drink plenty of fluids and include fruits, vegetables, and fiber in your diet.   ? It's normal for iron pills to make your stool a greenish or grayish black. But internal bleeding can also cause dark stool. So it's important to tell your doctor about any color changes. ? Call your doctor if you think you are having a problem with your iron pills. Even after you start to feel better, it will take several months for your body to build up its supply of iron. ? If you miss a pill, don't take a double dose. ? Keep iron pills out of the reach of small children. Too much iron can be very dangerous. · Eat foods with a lot of iron. These include red meat, shellfish, poultry, and eggs. They also include beans, raisins, whole-grain bread, and leafy green vegetables. · Steam your vegetables. This is the best way to prepare them if you want to get as much iron as possible. · Be safe with medicines. Do not take nonsteroidal anti-inflammatory pain relievers unless your doctor tells you to. These include aspirin, naproxen (Aleve), and ibuprofen (Advil, Motrin). · Liquid iron can stain your teeth. But you can mix it with water or juice and drink it with a straw. Then it won't get on your teeth. When should you call for help? Call 911 anytime you think you may need emergency care. For example, call if:    · You passed out (lost consciousness).    Call your doctor now or seek immediate medical care if:    · You are short of breath.     · You are dizzy or light-headed, or you feel like you may faint.     · You have new or worse bleeding.    Watch closely for changes in your health, and be sure to contact your doctor if:    · You feel weaker or more tired than usual.     · You do not get better as expected. Where can you learn more? Go to http://ashlie-funmilayo.info/. Enter N293 in the search box to learn more about \"Iron Deficiency Anemia: Care Instructions. \"  Current as of: March 28, 2019  Content Version: 12.2  © 2870-6202 [employer].  Care instructions adapted under license by [employer] (which disclaims liability or warranty for this information). If you have questions about a medical condition or this instruction, always ask your healthcare professional. [address] any warranty or liability for your use of this information.

## 2019-10-29 LAB — CANCER AG27-29 SERPL-ACNC: 13 U/ML (ref 0–38.6)

## 2020-02-03 ENCOUNTER — OFFICE VISIT (OUTPATIENT)
Dept: ONCOLOGY | Age: 65
End: 2020-02-03

## 2020-02-03 ENCOUNTER — HOSPITAL ENCOUNTER (OUTPATIENT)
Dept: LAB | Age: 65
Discharge: HOME | End: 2020-02-03
Payer: MEDICARE

## 2020-02-03 VITALS
BODY MASS INDEX: 26.22 KG/M2 | DIASTOLIC BLOOD PRESSURE: 65 MMHG | RESPIRATION RATE: 16 BRPM | HEART RATE: 60 BPM | HEIGHT: 69 IN | TEMPERATURE: 98.2 F | WEIGHT: 177 LBS | SYSTOLIC BLOOD PRESSURE: 157 MMHG

## 2020-02-03 DIAGNOSIS — D50.8 IRON DEFICIENCY ANEMIA SECONDARY TO INADEQUATE DIETARY IRON INTAKE: ICD-10-CM

## 2020-02-03 DIAGNOSIS — C50.212 MALIGNANT NEOPLASM OF UPPER-INNER QUADRANT OF LEFT BREAST IN FEMALE, ESTROGEN RECEPTOR POSITIVE (HCC): ICD-10-CM

## 2020-02-03 DIAGNOSIS — Z17.0 MALIGNANT NEOPLASM OF UPPER-INNER QUADRANT OF LEFT BREAST IN FEMALE, ESTROGEN RECEPTOR POSITIVE (HCC): Primary | ICD-10-CM

## 2020-02-03 DIAGNOSIS — Z79.811 LONG TERM (CURRENT) USE OF AROMATASE INHIBITORS: ICD-10-CM

## 2020-02-03 DIAGNOSIS — C50.212 MALIGNANT NEOPLASM OF UPPER-INNER QUADRANT OF LEFT BREAST IN FEMALE, ESTROGEN RECEPTOR POSITIVE (HCC): Primary | ICD-10-CM

## 2020-02-03 DIAGNOSIS — Z17.0 MALIGNANT NEOPLASM OF UPPER-INNER QUADRANT OF LEFT BREAST IN FEMALE, ESTROGEN RECEPTOR POSITIVE (HCC): ICD-10-CM

## 2020-02-03 LAB
ALBUMIN SERPL-MCNC: 4 G/DL (ref 3.4–5)
ALBUMIN/GLOB SERPL: 1.1 {RATIO} (ref 0.8–1.7)
ALP SERPL-CCNC: 96 U/L (ref 45–117)
ALT SERPL-CCNC: 16 U/L (ref 13–56)
ANION GAP SERPL CALC-SCNC: 6 MMOL/L (ref 3–18)
AST SERPL-CCNC: 15 U/L (ref 10–38)
BASOPHILS # BLD: 0 K/UL (ref 0–0.1)
BASOPHILS NFR BLD: 1 % (ref 0–2)
BILIRUB SERPL-MCNC: 0.3 MG/DL (ref 0.2–1)
BUN SERPL-MCNC: 22 MG/DL (ref 7–18)
BUN/CREAT SERPL: 28 (ref 12–20)
CALCIUM SERPL-MCNC: 9.5 MG/DL (ref 8.5–10.1)
CHLORIDE SERPL-SCNC: 103 MMOL/L (ref 100–111)
CO2 SERPL-SCNC: 28 MMOL/L (ref 21–32)
CREAT SERPL-MCNC: 0.8 MG/DL (ref 0.6–1.3)
DIFFERENTIAL METHOD BLD: ABNORMAL
EOSINOPHIL # BLD: 0.1 K/UL (ref 0–0.4)
EOSINOPHIL NFR BLD: 1 % (ref 0–5)
ERYTHROCYTE [DISTWIDTH] IN BLOOD BY AUTOMATED COUNT: 14.6 % (ref 11.6–14.5)
FERRITIN SERPL-MCNC: 109 NG/ML (ref 8–388)
GLOBULIN SER CALC-MCNC: 3.6 G/DL (ref 2–4)
GLUCOSE SERPL-MCNC: 83 MG/DL (ref 74–99)
HCT VFR BLD AUTO: 32.7 % (ref 35–45)
HGB BLD-MCNC: 10.8 G/DL (ref 12–16)
IRON SATN MFR SERPL: 20 % (ref 20–50)
IRON SERPL-MCNC: 61 UG/DL (ref 50–175)
LYMPHOCYTES # BLD: 1.7 K/UL (ref 0.9–3.6)
LYMPHOCYTES NFR BLD: 29 % (ref 21–52)
MCH RBC QN AUTO: 29.4 PG (ref 24–34)
MCHC RBC AUTO-ENTMCNC: 33 G/DL (ref 31–37)
MCV RBC AUTO: 89.1 FL (ref 74–97)
MONOCYTES # BLD: 0.6 K/UL (ref 0.05–1.2)
MONOCYTES NFR BLD: 10 % (ref 3–10)
NEUTS SEG # BLD: 3.5 K/UL (ref 1.8–8)
NEUTS SEG NFR BLD: 59 % (ref 40–73)
PLATELET # BLD AUTO: 208 K/UL (ref 135–420)
PMV BLD AUTO: 12 FL (ref 9.2–11.8)
POTASSIUM SERPL-SCNC: 3.6 MMOL/L (ref 3.5–5.5)
PROT SERPL-MCNC: 7.6 G/DL (ref 6.4–8.2)
RBC # BLD AUTO: 3.67 M/UL (ref 4.2–5.3)
SODIUM SERPL-SCNC: 137 MMOL/L (ref 136–145)
TIBC SERPL-MCNC: 306 UG/DL (ref 250–450)
WBC # BLD AUTO: 5.9 K/UL (ref 4.6–13.2)

## 2020-02-03 PROCEDURE — 82728 ASSAY OF FERRITIN: CPT

## 2020-02-03 PROCEDURE — 83540 ASSAY OF IRON: CPT

## 2020-02-03 PROCEDURE — 80053 COMPREHEN METABOLIC PANEL: CPT

## 2020-02-03 PROCEDURE — 36415 COLL VENOUS BLD VENIPUNCTURE: CPT

## 2020-02-03 PROCEDURE — 86300 IMMUNOASSAY TUMOR CA 15-3: CPT

## 2020-02-03 PROCEDURE — 85025 COMPLETE CBC W/AUTO DIFF WBC: CPT

## 2020-02-03 NOTE — PATIENT INSTRUCTIONS

## 2020-02-03 NOTE — PROGRESS NOTES
Hematology/Oncology  Progress Note    Name: Deanie Bosworth  Date: 2/3/2020  : 1955    PCP: Chapito Reeder MD     Ms. Doris Allen is a 71 y.o. female who was seen for management of her invasive ductal adenocarcinoma of the left breast.    Current therapy: Femara 2.5mg PO daily started May 2017    Subjective:     Mrs. Doris Allen is a 50-year-old woman who has a history of invasive ductal adenocarcinoma involving the left breast.  She is doing well. She continues to take Femara 2.5mg PO daily and is tolerating the treatment reasonably well. She has no new complaints or concerns to report. She is Up to date with her mammogram.  The patient reports that she continues to do breast self exams on a monthly basis. Past medical history, family history, and social history: these were reviewed and remains unchanged.     Past Medical History:   Diagnosis Date    Anemia      Past Surgical History:   Procedure Laterality Date    BREAST SURGERY PROCEDURE UNLISTED      HX HYSTERECTOMY       Social History     Socioeconomic History    Marital status:      Spouse name: Not on file    Number of children: Not on file    Years of education: Not on file    Highest education level: Not on file   Occupational History    Not on file   Social Needs    Financial resource strain: Not on file    Food insecurity:     Worry: Not on file     Inability: Not on file    Transportation needs:     Medical: Not on file     Non-medical: Not on file   Tobacco Use    Smoking status: Never Smoker    Smokeless tobacco: Never Used   Substance and Sexual Activity    Alcohol use: No    Drug use: Not on file    Sexual activity: Not on file   Lifestyle    Physical activity:     Days per week: Not on file     Minutes per session: Not on file    Stress: Not on file   Relationships    Social connections:     Talks on phone: Not on file     Gets together: Not on file     Attends Rastafarian service: Not on file     Active member of club or organization: Not on file     Attends meetings of clubs or organizations: Not on file     Relationship status: Not on file    Intimate partner violence:     Fear of current or ex partner: Not on file     Emotionally abused: Not on file     Physically abused: Not on file     Forced sexual activity: Not on file   Other Topics Concern    Not on file   Social History Narrative    Not on file     Family History   Problem Relation Age of Onset    Heart Disease Mother     Cancer Mother         colon    Diabetes Father     Cancer Brother         prostate    Alzheimer Paternal Uncle     Other Maternal Grandfather         old age   Farideh Flores Cancer Brother         colon    Alzheimer Cousin      Current Outpatient Medications   Medication Sig Dispense Refill    ciclopirox (LOPROX) 0.77 % topical cream APPLY CREAM TO SOLES AND SIDES OF FEET TWICE DAILY  99    terbinafine HCl (LAMISIL) 250 mg tablet TAKE 1 TABLET BY MOUTH ONCE DAILY  0    letrozole (FEMARA) 2.5 mg tablet TAKE 1 TABLET BY MOUTH ONCE DAILY 90 Tab 3    letrozole (FEMARA) 2.5 mg tablet TAKE ONE TABLET BY MOUTH ONCE DAILY 90 Tab 3    cholecalciferol (VITAMIN D3) 1,000 unit tablet 1,000 Units.  Ferrous Fumarate 325 mg (106 mg iron) tab Take  by Mouth Once a Day.  docosahexanoic acid/epa (FISH OIL PO) Take  by Mouth Once a Day.  losartan (COZAAR) 100 mg tablet Take 1 Tab by Mouth Once a Day.  metFORMIN (GLUMETZA ER) 500 mg TG24 24 hour tablet Take 1 Tab by Mouth Once a Day.  multivitamin (ONE A DAY) tablet Take 1 Tab by Mouth Once a Day.  simvastatin (ZOCOR) 40 mg tablet 20 mg.      triamterene-hydroCHLOROthiazide (MAXZIDE) 75-50 mg per tablet          Review of Systems  Constitutional: The patient has no acute distress or discomfort. HEENT: The patient denies recent head trauma, eye pain, blurred vision,  hearing deficit, oropharyngeal mucosal pain or lesions, and the patient denies throat pain or discomfort. Lymphatics:  The patient denies palpable peripheral lymphadenopathy. Hematologic: The patient denies having bruising, bleeding, or progressive fatigue. Respiratory: Patient denies having shortness of breath, cough, sputum production, fever, or dyspnea on exertion. Cardiovascular: The patient denies having leg pain, leg swelling, heart palpitations, chest permit, chest pain, or lightheadedness. The patient denies having dyspnea on exertion. Gastrointestinal: The patient denies having nausea, emesis, or diarrhea. The patient denies having any hematemesis or blood in the stool. Genitourinary: Patient denies having urinary urgency, frequency, or dysuria. The patient denies having blood in the urine. Psychological: The patient denies having symptoms of nervousness, anxiety, depression, or thoughts of harming self. Skin: Patient denies having skin rashes, skin, ulcerations, or unexplained itching or pruritus. Musculoskeletal: The patient denies having pain in the joints or bones. Objective:     Visit Vitals  /65   Pulse 60   Temp 98.2 °F (36.8 °C) (Oral)   Resp 16   Ht 5' 9\" (1.753 m)   Wt 80.3 kg (177 lb)   BMI 26.14 kg/m²     ECOG PS=0; Pain score=0/10  Physical Exam:   Gen. Appearance: The patient is in no acute distress. Skin: There is no bruise or rash. HEENT: The exam is unremarkable. Neck: Supple without lymphadenopathy or thyromegaly. Lungs: Clear to auscultation and percussion; there are no wheezes or rhonchi. Heart: Regular rate and rhythm; there are no murmurs, gallops, or rubs. Anterior chest wall and breast: there is no eveidence of disease recurrence. Axilla reveals no palpable lymphadenopathy. Abdomen: Bowel sounds are present and normal.  There is no guarding, tenderness, or hepatosplenomegaly. Extremities: There is no clubbing, cyanosis, or edema. Neurologic: There are no focal neurologic deficits. Lymphatics: There is no palpable peripheral lymphadenopathy.  Musculoskeletal: The patient has full range of motion at all joints. There is no evidence of joint deformity or effusions. There is no focal joint tenderness. Psychological/psychiatric: There is no clinical evidence of anxiety, depression, or melancholy. Lab data:      Results for orders placed or performed during the hospital encounter of 07/22/19   CBC WITH 3 PART DIFF     Status: Abnormal   Result Value Ref Range Status    WBC 5.9 4.5 - 13.0 K/uL Final    RBC 3.63 (L) 4.10 - 5.10 M/uL Final    HGB 10.8 (L) 12.0 - 16 g/dL Final    HCT 32.8 (L) 36 - 48 % Final    MCV 90.4 78 - 102 FL Final    MCH 29.8 25.0 - 35.0 PG Final    MCHC 32.9 31 - 37 g/dL Final    RDW 13.6 11.5 - 14.5 % Final    PLATELET 172 729 - 390 K/uL Final    NEUTROPHILS 62 40 - 70 % Final    MIXED CELLS 10 0.1 - 17 % Final    LYMPHOCYTES 28 14 - 44 % Final    ABS. NEUTROPHILS 3.6 1.8 - 9.5 K/UL Final    ABS. MIXED CELLS 0.6 0.0 - 2.3 K/uL Final    ABS. LYMPHOCYTES 1.7 1.1 - 5.9 K/UL Final     Comment: Test performed at [address] [place] [address] or Outpatient Infusion Center Location. Reviewed by Medical Director. DF AUTOMATED   Final           Assessment:     1. Malignant neoplasm of upper-inner quadrant of left breast in female, estrogen receptor positive ([facility] [place] [address]    2. Long term (current) use of aromatase inhibitors    3. Iron deficiency anemia secondary to inadequate dietary iron intake      Plan:   Invasive ductal adenocarcinoma left breast/long-term current use of aromatase inhibitors: I have encouraged the patient to continue with her self breast exams. She will also continue with annual screening mammography. Recent mammogram on was on 3/18/2019. Next mammogram is scheduled for March 2020 Femara 2.5mg PO daily will be continued. Her CA 27-29 on 10/28/2019 was 13 u/mL. At this time I will check her CA-27-29 level. A new prescription for Femara 2.5 mg daily with refills will be entered prior to her next clinic visit.   We will remain on the medication for full 10 years. Functional iron deficiency anemia: I have explained to the patient that her CBC from today is pending. The most recent CBC from 10/28/2019 showed a hemoglobin of 10.7 g/dL with hematocrit of 33.2%. The patient was advised to continue to take the Slow Fe 1 tablet daily. At this time I will recheck her iron profile and ferritin levels. The ferritin level from 10/28/2019 was 53 ng/mL. Total time 25 minutes, greater than 50% of the time was in counseling and coordination of care. Follow-up will occur in 4 months or sooner if indicated.   Orders Placed This Encounter    CBC WITH AUTOMATED DIFF     Standing Status:   Future     Standing Expiration Date:   8/4/1336    METABOLIC PANEL, COMPREHENSIVE     Standing Status:   Future     Standing Expiration Date:   2/3/2021    IRON PROFILE     Standing Status:   Future     Standing Expiration Date:   2/3/2021    FERRITIN     Standing Status:   Future     Standing Expiration Date:   2/3/2021    CA 27.29     Standing Status:   Future     Standing Expiration Date:   2/3/2021       Navya Strickland MD  2/3/2020

## 2020-02-04 LAB — CANCER AG27-29 SERPL-ACNC: 10.4 U/ML (ref 0–38.6)

## 2020-02-26 ENCOUNTER — HOSPITAL ENCOUNTER (OUTPATIENT)
Dept: NON INVASIVE DIAGNOSTICS | Age: 65
Discharge: HOME | End: 2020-02-26
Attending: INTERNAL MEDICINE
Payer: MEDICARE

## 2020-02-26 VITALS
HEIGHT: 69 IN | SYSTOLIC BLOOD PRESSURE: 157 MMHG | DIASTOLIC BLOOD PRESSURE: 65 MMHG | BODY MASS INDEX: 26.22 KG/M2 | WEIGHT: 177 LBS

## 2020-02-26 DIAGNOSIS — R01.1 HEART MURMUR: ICD-10-CM

## 2020-02-26 LAB
ECHO AO ROOT DIAM: 2.87 CM
ECHO LA AREA 4C: 24.3 CM2
ECHO LA VOL 2C: 54.95 ML (ref 22–52)
ECHO LA VOL 4C: 84.13 ML (ref 22–52)
ECHO LA VOL BP: 72.39 ML (ref 22–52)
ECHO LA VOL/BSA BIPLANE: 36.91 ML/M2 (ref 16–28)
ECHO LA VOLUME INDEX A2C: 28.02 ML/M2 (ref 16–28)
ECHO LA VOLUME INDEX A4C: 42.9 ML/M2 (ref 16–28)
ECHO LV E' LATERAL VELOCITY: 13.84 CM/S
ECHO LV E' SEPTAL VELOCITY: 9.01 CM/S
ECHO LV EDV TEICHHOLZ: 0.83 ML
ECHO LV ESV TEICHHOLZ: 0.32 ML
ECHO LV INTERNAL DIMENSION DIASTOLIC: 5.47 CM (ref 3.9–5.3)
ECHO LV INTERNAL DIMENSION SYSTOLIC: 3.65 CM
ECHO LV IVSD: 0.89 CM (ref 0.6–0.9)
ECHO LV MASS 2D: 230.5 G (ref 67–162)
ECHO LV MASS INDEX 2D: 117.5 G/M2 (ref 43–95)
ECHO LV POSTERIOR WALL DIASTOLIC: 1 CM (ref 0.6–0.9)
ECHO LVOT DIAM: 2.21 CM
ECHO LVOT PEAK GRADIENT: 3 MMHG
ECHO LVOT PEAK VELOCITY: 86 CM/S
ECHO LVOT VTI: 21.32 CM
ECHO MV A VELOCITY: 76.99 CM/S
ECHO MV E DECELERATION TIME (DT): 174.1 MS
ECHO MV E VELOCITY: 88.98 CM/S
ECHO MV E/A RATIO: 1.16
ECHO MV E/E' LATERAL: 6.43
ECHO MV E/E' RATIO (AVERAGED): 8.15
ECHO MV E/E' SEPTAL: 9.88
ECHO RV TAPSE: 2.63 CM (ref 1.5–2)
LVFS 2D: 33.31 %
LVOT MG: 1.63 MMHG
LVOT MV: 0.61 CM/S
LVSV (TEICH): 45.09 ML
MV DEC SLOPE: 5.11

## 2020-02-26 PROCEDURE — 93306 TTE W/DOPPLER COMPLETE: CPT

## 2020-03-06 ENCOUNTER — OFFICE VISIT (OUTPATIENT)
Dept: CARDIOLOGY CLINIC | Age: 65
End: 2020-03-06

## 2020-03-06 VITALS
HEIGHT: 69 IN | SYSTOLIC BLOOD PRESSURE: 122 MMHG | BODY MASS INDEX: 26.81 KG/M2 | WEIGHT: 181 LBS | DIASTOLIC BLOOD PRESSURE: 62 MMHG | OXYGEN SATURATION: 98 % | HEART RATE: 56 BPM

## 2020-03-06 DIAGNOSIS — C50.212 MALIGNANT NEOPLASM OF UPPER-INNER QUADRANT OF LEFT FEMALE BREAST, UNSPECIFIED ESTROGEN RECEPTOR STATUS (HCC): ICD-10-CM

## 2020-03-06 DIAGNOSIS — D50.8 IRON DEFICIENCY ANEMIA SECONDARY TO INADEQUATE DIETARY IRON INTAKE: ICD-10-CM

## 2020-03-06 DIAGNOSIS — R01.1 HEART MURMUR: Primary | ICD-10-CM

## 2020-03-06 RX ORDER — LOSARTAN POTASSIUM 50 MG/1
TABLET ORAL
Qty: 30 TAB | Refills: 3 | Status: SHIPPED | OUTPATIENT
Start: 2020-03-06 | End: 2020-07-06 | Stop reason: SDUPTHER

## 2020-03-06 NOTE — PROGRESS NOTES
Neida Gregory presents today for   Chief Complaint   Patient presents with   [facility] New Patient     Self- Referred       Neida Gregory preferred language for health care discussion is english/other. Is someone accompanying this pt? no    Is the patient using any DME equipment during [address]? no    Depression Screening:  3 most recent PHQ Screens 3/6/2020   Little interest or pleasure in doing things Not at all   Feeling down, depressed, irritable, or hopeless Not at all   Total Score PHQ 2 0       Learning Assessment:  Learning Assessment 10/28/2019   PRIMARY LEARNER Patient   BARRIERS PRIMARY LEARNER [address] CAREGIVER No   PRIMARY LANGUAGE ENGLISH   LEARNER PREFERENCE PRIMARY DEMONSTRATION   ANSWERED BY self   RELATIONSHIP SELF       Abuse Screening:  Abuse Screening Questionnaire 10/28/2019   Do you ever feel afraid of your partner? N   Are you in a relationship with someone who physically or mentally threatens you? N   Is it safe for you to go home? Y       Fall Risk  Fall Risk Assessment, last 12 mths 3/6/2020   Able to walk? Yes   Fall in past 12 months? Yes   Fall with injury? No   Number of falls in past 12 months 2   Fall Risk Score 2       Pt currently taking Anticoagulant therapy? no    Coordination of Care:  1. Have you been to the ER, urgent care clinic since your last visit? Hospitalized since your last visit? no    2. Have you seen or consulted any other health care providers outside of the [address] [place] [address] since your last visit? Include any pap smears or colon screening.  no

## 2020-03-06 NOTE — PROGRESS NOTES
Dary Xavier    Perioperative risk stratification for colonoscopy  \"all my vitals dropped\"  Dizziness    HPI    Dary Xavier is a 76 y.o. -American female with no known coronary disease here for perioperative or stratification prior to colonoscopy. As you know this is a very active patient whose only chronic medical problem is iron deficiency anemia and I understand she is currently undergoing a work-up for this. She has absolutely no chest pain has never passed out no palpitations or lower extremity edema. She does tell me she is lost 60 pounds most recently and was on metformin for short amount of time but after the weight loss was able to come off of it. She was considered prediabetic for a few years but again recently lost quite a bit of weight. She is only on losartan for blood pressure and has been on this amount of losartan even when she was heavier. More recently she has noticed that if she changes positions too quickly even could be standing and suddenly can feel a bit lightheaded. This sensation just lasts maybe a couple seconds to minutes and goes away. She did not think much of it and did not really complain until she was recently adequate    Past Medical History:   Diagnosis Date    Anemia        Past Surgical History:   Procedure Laterality Date    BREAST SURGERY PROCEDURE UNLISTED      HX HYSTERECTOMY         Current Outpatient Medications   Medication Sig Dispense Refill    losartan (COZAAR) 50 mg tablet Take 1 tablet by mouth nightly. Indications: high blood pressure 30 Tab 3    letrozole (FEMARA) 2.5 mg tablet TAKE ONE TABLET BY MOUTH ONCE DAILY 90 Tab 3    cholecalciferol (VITAMIN D3) 1,000 unit tablet 1,000 Units.  docosahexanoic acid/epa (FISH OIL PO) Take  by Mouth Once a Day.  multivitamin (ONE A DAY) tablet Take 1 Tab by Mouth Once a Day.        simvastatin (ZOCOR) 40 mg tablet 20 mg.      triamterene-hydroCHLOROthiazide (MAXZIDE) 75-50 mg per tablet       ciclopirox (LOPROX) 0.77 % topical cream APPLY CREAM TO SOLES AND SIDES OF FEET TWICE DAILY  99    terbinafine HCl (LAMISIL) 250 mg tablet TAKE 1 TABLET BY MOUTH ONCE DAILY  0    Ferrous Fumarate 325 mg (106 mg iron) tab Take  by Mouth Once a Day.  metFORMIN (GLUMETZA ER) 500 mg TG24 24 hour tablet Take 1 Tab by Mouth Once a Day. No Known Allergies    Social History     Socioeconomic History    Marital status:      Spouse name: Not on file    Number of children: Not on file    Years of education: Not on file    Highest education level: Not on file   Occupational History    Not on file   Social Needs    Financial resource strain: Not on file    Food insecurity:     Worry: Not on file     Inability: Not on file    Transportation needs:     Medical: Not on file     Non-medical: Not on file   Tobacco Use    Smoking status: Never Smoker    Smokeless tobacco: Never Used   Substance and Sexual Activity    Alcohol use: No    Drug use: Not on file    Sexual activity: Not on file   Lifestyle    Physical activity:     Days per week: Not on file     Minutes per session: Not on file    Stress: Not on file   Relationships    Social connections:     Talks on phone: Not on file     Gets together: Not on file     Attends Sikh service: Not on file     Active member of club or organization: Not on file     Attends meetings of clubs or organizations: Not on file     Relationship status: Not on file    Intimate partner violence:     Fear of current or ex partner: Not on file     Emotionally abused: Not on file     Physically abused: Not on file     Forced sexual activity: Not on file   Other Topics Concern    Not on file   Social History Narrative    Not on file        The patient has a family history of    Review of Systems    14 pt Review of Systems is negative unless otherwise mentioned in the HPI.     Wt Readings from Last 3 Encounters:   03/06/20 82.1 kg (181 lb)   02/26/20 80.3 kg (177 lb)   02/03/20 80.3 kg (177 lb)     Temp Readings from Last 3 Encounters:   02/03/20 98.2 °F (36.8 °C) (Oral)   07/22/19 96 °F (35.6 °C) (Oral)   03/11/19 97.7 °F (36.5 °C) (Oral)     BP Readings from Last 3 Encounters:   03/06/20 122/62   02/26/20 157/65   02/03/20 157/65     Pulse Readings from Last 3 Encounters:   03/06/20 (!) 56   02/03/20 60   10/28/19 (!) 59       02/26/20   ECHO ADULT COMPLETE 02/26/2020 2/26/2020    Narrative · Normal cavity size, wall thickness and systolic function (ejection   fraction normal). Estimated left ventricular ejection fraction is 55 -   60%. Visually measured ejection fraction. No regional wall motion   abnormality noted. Age-appropriate left ventricular diastolic function. · Mildly dilated left atrium. · Trace mitral valve regurgitation is present. Signed by: Taylor Slade MD       Physical Exam:    Visit Vitals  /62 (BP 1 Location: Right arm, BP Patient Position: Sitting)   Pulse (!) 56   Ht 5' 9\" (1.753 m)   Wt 82.1 kg (181 lb)   SpO2 98%   BMI 26.73 kg/m²      Physical Exam  HENT:      Head: Normocephalic and atraumatic. Eyes:      Pupils: Pupils are equal, round, and reactive to light. Cardiovascular:      Rate and Rhythm: Normal rate and regular rhythm. Heart sounds: Normal heart sounds. No murmur. No friction rub. No gallop. Pulmonary:      Effort: Pulmonary effort is normal. No respiratory distress. Breath sounds: Normal breath sounds. No wheezing or rales. Chest:      Chest wall: No tenderness. Abdominal:      General: Bowel sounds are normal.      Palpations: Abdomen is soft. Musculoskeletal:         General: No tenderness. Skin:     General: Skin is warm and dry. Neurological:      Mental Status: She is alert and oriented to person, place, and time.          EKG today shows: NSR, normal axis and intervals, no ST segment abnormalities    Impression and Plan:  Eyad Reeder is a 76 y.o. with:    1.) Perioperative risk stratification prior to colonoscopy  2.) Dizziness/ \"vitals dropping\" likely stress reaction/ autonomic dysfunction that her BP med may be contributing to  3.) Recent 60 lb wt loss with h/o preDM  4.) HTN  5.) Normal LV function    1.) Cut down on Losartan to 50 mg daily  2.) Keep close eye on symptoms and will recheck her back in a couple weeks, if doing ok will proceed with colonoscopy    >60 mins spent  Would not pursue ischemic eval, doubt this reaction was ACS- may even just be due to stress  Will do my best to optimize her prior to this low risk procedure    Thank you for allowing me to participate in the care of your patient, please do not hesitate to call with questions or concerns.     Kindest Regards,    Aurea Alvarado, DO

## 2020-05-10 NOTE — PROGRESS NOTES
Hematology/Oncology  Progress Note    Name: Samuel Marsh  Date: 2/3/2020  : 1955    PCP: Diana Quijano MD     Ms. Francis Tsai is a 76 y.o. female who was seen for management of her invasive ductal adenocarcinoma of the left breast.    Current therapy: Femara 2.5mg PO daily started May 2017    Subjective:     Mrs. Francis Tsai is a 69-year-old woman who has a history of invasive ductal adenocarcinoma involving the left breast.    Today she is doing well. She continues to take Femara 2.5mg PO daily and is tolerating the treatment reasonably well. Today she reported feeling well overall. She denied any significant change or distress since last visit. She was informed by her primary care physician she may have rheumatoid arthritis and would like to have referral to rheumatology. She will have mammogram done next month, and also colonoscopy. She denied any fever, chills, skin lumps or bumps, unintentional weight loss, acute bleeding, bruising. She denied any nausea, vomiting, abdominal pain, diarrhea, focal weakness or numbness, new pain or bone pain. She has no other complaints at this point. The patient reports that she continues to do breast self exams on a monthly basis. Past medical history, family history, and social history: these were reviewed and remains unchanged.     Past Medical History:   Diagnosis Date    Anemia      Past Surgical History:   Procedure Laterality Date    BREAST SURGERY PROCEDURE UNLISTED      HX HYSTERECTOMY       Social History     Socioeconomic History    Marital status:      Spouse name: Not on file    Number of children: Not on file    Years of education: Not on file    Highest education level: Not on file   Occupational History    Not on file   Social Needs    Financial resource strain: Not on file    Food insecurity     Worry: Not on file     Inability: Not on file    Transportation needs     Medical: Not on file     Non-medical: Not on file   Tobacco Use    Smoking status: Never Smoker    Smokeless tobacco: Never Used   Substance and Sexual Activity    Alcohol use: No    Drug use: Not on file    Sexual activity: Not on file   Lifestyle    Physical activity     Days per week: Not on file     Minutes per session: Not on file    Stress: Not on file   Relationships    Social connections     Talks on phone: Not on file     Gets together: Not on file     Attends Buddhism service: Not on file     Active member of club or organization: Not on file     Attends meetings of clubs or organizations: Not on file     Relationship status: Not on file    Intimate partner violence     Fear of current or ex partner: Not on file     Emotionally abused: Not on file     Physically abused: Not on file     Forced sexual activity: Not on file   Other Topics Concern    Not on file   Social History Narrative    Not on file     Family History   Problem Relation Age of Onset    Heart Disease Mother     Cancer Mother         colon    Diabetes Father     Cancer Brother         prostate    Alzheimer Paternal Uncle     Other Maternal Grandfather         old age   [facility] Cancer Brother         colon    Alzheimer Cousin      Current Outpatient Medications   Medication Sig Dispense Refill    losartan (COZAAR) 50 mg tablet Take 1 tablet by mouth nightly. Indications: high blood pressure 30 Tab 3    ciclopirox (LOPROX) 0.77 % topical cream APPLY CREAM TO SOLES AND SIDES OF FEET TWICE DAILY  99    terbinafine HCl (LAMISIL) 250 mg tablet TAKE 1 TABLET BY MOUTH ONCE DAILY  0    letrozole (FEMARA) 2.5 mg tablet TAKE ONE TABLET BY MOUTH ONCE DAILY 90 Tab 3    cholecalciferol (VITAMIN D3) 1,000 unit tablet 1,000 Units.  Ferrous Fumarate 325 mg (106 mg iron) tab Take  by Mouth Once a Day.  docosahexanoic acid/epa (FISH OIL PO) Take  by Mouth Once a Day.  metFORMIN (GLUMETZA ER) 500 mg TG24 24 hour tablet Take 1 Tab by Mouth Once a Day.       multivitamin (ONE A DAY) tablet Take 1 Tab by Mouth Once a Day.  simvastatin (ZOCOR) 40 mg tablet 20 mg.      triamterene-hydroCHLOROthiazide (MAXZIDE) 75-50 mg per tablet          Review of Systems   Constitutional: Negative for chills, diaphoresis, fever, malaise/fatigue and weight loss. Respiratory: Negative for cough, hemoptysis, shortness of breath and wheezing. Cardiovascular: Negative for chest pain, palpitations and leg swelling. Gastrointestinal: Negative for abdominal pain, diarrhea, heartburn, nausea and vomiting. Genitourinary: Negative for dysuria, frequency, hematuria and urgency. Musculoskeletal: Negative for joint pain and myalgias. Skin: Negative for itching and rash. Neurological: Negative for dizziness, seizures, weakness and headaches. Psychiatric/Behavioral: Negative for depression. The patient does not have insomnia. Objective:     Visit Vitals  /69   Pulse 64   Temp 98.6 °F (37 °C) (Oral)   Resp 18   Ht 5' 9\" (1.753 m)   Wt 83 kg (183 lb)   SpO2 100%   BMI 27.02 kg/m²       ECOG Performance Status (grade): 0  0 - able to carry on all pre-disease activity w/out restriction  1 - restricted but able to carry out light work  2 - ambulatory and can self- care but unable to carry out work  3 - bed or chair >50% of waking hours  4 - completely disable, total care, confined to bed or chair    Physical Exam  Constitutional:       Appearance: Normal appearance. HENT:      Head: Normocephalic and atraumatic. Eyes:      Pupils: Pupils are equal, round, and reactive to light. Neck:      Musculoskeletal: Neck supple. Cardiovascular:      Rate and Rhythm: Normal rate and regular rhythm. Heart sounds: Normal heart sounds. Pulmonary:      Effort: Pulmonary effort is normal.      Breath sounds: Normal breath sounds. Abdominal:      General: Bowel sounds are normal.      Palpations: Abdomen is soft. Tenderness: There is no abdominal tenderness. There is no guarding.    Musculoskeletal: Normal range of motion. Skin:     General: Skin is warm. Neurological:      General: No focal deficit present. Mental Status: She is alert and oriented to person, place, and time. Mental status is at baseline. Diagnostics:      No results found for this or any previous visit (from the past 96 hour(s)). Imaging:  No results found for this or any previous visit. Results for orders placed during the hospital encounter of 09/09/19   XR RIBS LT UNI 2 V    Narrative EXAM: XR RIBS LT UNI 2 V    INDICATION: 71 years Female. rib left. ADDITIONAL HISTORY: None reported trauma. Rib pain. TECHNIQUE: 3 views of the left ribs    COMPARISON: This radiograph 1/18/2011    FINDINGS:  There is no displaced left-sided rib fracture. There is generalized osteopenia. Moderate degenerative changes noted in the mid and lower thoracic spine. No  evidence of acute osseous abnormality. There is tortuosity the aorta. Atherosclerotic calcifications are noted in the  aorta. No confluent consolidation is noted in the visualized lungs. No evidence  of left-sided pleural effusion. Impression IMPRESSION:  1. No displaced left sided rib fracture. 2.  Tortuosity of the aorta. Aortic atherosclerosis. No results found for this or any previous visit. Assessment:     1. Malignant neoplasm of upper-inner quadrant of left breast in female, estrogen receptor positive ([address]    2. Long term (current) use of aromatase inhibitors    3. Iron deficiency anemia secondary to inadequate dietary iron intake      Plan:   # Invasive ductal adenocarcinoma left breast  -- I have encouraged the patient to continue with her self breast exams. She will also continue with annual screening mammography. She states she is going to get mammogram next month. -- Femara 2.5mg PO daily will be continued. She would like to remain on the medication for full 10 years as previous discussed with Dr. Yuli Jay.   -- Her previous CA 27-29 was WNL. At this time I will check her CA-27-29 level. # Long-term current use of aromatase inhibitors:   # Bone Health:   -- Advised on adequate amounts of calcium (at least 1,200 mg per day) and vitamin D(800-1,000 IU per day). The higher dose of vitamin D may be needed if vitamin D deficiency. -- Recommend regular weight-bearing and muscle-strengthening exercise to reduce the risk of falls and fractures. -- Advise avoidance of tobacco smoking and excessive alcohol intake. --The patient was advised to follow-up her primary care physician for DEXA scan and vitamin D checking. # Lifestyle Modification:  -- Discussed/ recommend ACS guidelines, Impact on lifestyle modification on cancer outcomes. -- Achieve and maintain a healthy weight throughout life.  + Be as lean as possible throughout life without being underweight.  + Avoid excess weight gain at all ages. For those who are overweight or obese, losing even a small amount of weight has health benefits and is a good place to start.  + Get regular physical activity and limit intake of high-calorie foods and drinks as keys to help maintain a healthy weight. -- Be physically active.  + Adults: Get at least 150 minutes of moderate intensity or 75 minutes of vigorous intensity activity each week (or a combination of these), preferably spread throughout the week. -- Eat a healthy diet, with an emphasis on plant foods. + Choose foods and drinks in amounts that help you get to and maintain a healthy weight.  + Limit how much processed meat and red meat you eat.  + Eat at least 2½ cups of vegetables and fruits each day.  + Choose whole grains instead of refined grain products.  + If you drink alcohol, limit your intake, ideally no alcohol intake. # Functional iron deficiency anemia:  -- The patient was advised to continue to take the Slow Fe 1 tablet daily. At this time I will recheck her iron profile and ferritin levels.   The patient reported she has follow-up with her GI and already scheduled colonoscopy next month. -- I will see the patient back in clinic in about 3 months. Always sooner if required. The patient can have lab done prior our next clinic visit. Orders Placed This Encounter    COMPLETE CBC & AUTO DIFF WBC    InHouse CBC ([facility])     Standing Status:   Future     Number of Occurrences:   1     Standing Expiration Date:   2/85/3277    METABOLIC PANEL, COMPREHENSIVE     Standing Status:   Future     Standing Expiration Date:   5/12/2021    CA 27.29     Standing Status:   Future     Standing Expiration Date:   5/12/2021    IRON PROFILE     Standing Status:   Future     Standing Expiration Date:   5/12/2021    FERRITIN     Standing Status:   Future     Standing Expiration Date:   5/12/2021       Ms. Ayleen Buckner has a reminder for a \"due or due soon\" health maintenance. I have asked that she contact her primary care provider for follow-up on this health maintenance. All of patient's questions answered to their apparent satisfaction. They verbally show understanding and agreement with aforementioned plan. Quyen Wyatt MD  5/11/2020          About 25 minutes were spent for this encounter with more than 50% of the time spent in face-to-face counseling, discussing on diagnosis and management plan going forward, and co-ordination of care. Parts of this document has been produced using Dragon dictation system. Unrecognized errors in transcription may be present. Please do not hesitate to reach out for any questions or clarifications.         CC: Lisa Calvert MD

## 2020-05-11 ENCOUNTER — OFFICE VISIT (OUTPATIENT)
Dept: ONCOLOGY | Age: 65
End: 2020-05-11

## 2020-05-11 ENCOUNTER — HOSPITAL ENCOUNTER (OUTPATIENT)
Dept: LAB | Age: 65
Discharge: HOME | End: 2020-05-11
Payer: MEDICARE

## 2020-05-11 ENCOUNTER — HOSPITAL ENCOUNTER (OUTPATIENT)
Dept: ONCOLOGY | Age: 65
Discharge: HOME | End: 2020-05-11

## 2020-05-11 VITALS
BODY MASS INDEX: 27.11 KG/M2 | TEMPERATURE: 98.6 F | DIASTOLIC BLOOD PRESSURE: 69 MMHG | OXYGEN SATURATION: 100 % | SYSTOLIC BLOOD PRESSURE: 165 MMHG | HEART RATE: 64 BPM | WEIGHT: 183 LBS | HEIGHT: 69 IN | RESPIRATION RATE: 18 BRPM

## 2020-05-11 DIAGNOSIS — Z17.0 MALIGNANT NEOPLASM OF UPPER-INNER QUADRANT OF LEFT BREAST IN FEMALE, ESTROGEN RECEPTOR POSITIVE (HCC): Primary | ICD-10-CM

## 2020-05-11 DIAGNOSIS — C50.212 MALIGNANT NEOPLASM OF UPPER-INNER QUADRANT OF LEFT BREAST IN FEMALE, ESTROGEN RECEPTOR POSITIVE (HCC): Primary | ICD-10-CM

## 2020-05-11 DIAGNOSIS — C50.212 MALIGNANT NEOPLASM OF UPPER-INNER QUADRANT OF LEFT BREAST IN FEMALE, ESTROGEN RECEPTOR POSITIVE (HCC): ICD-10-CM

## 2020-05-11 DIAGNOSIS — Z79.811 LONG TERM (CURRENT) USE OF AROMATASE INHIBITORS: ICD-10-CM

## 2020-05-11 DIAGNOSIS — Z17.0 MALIGNANT NEOPLASM OF UPPER-INNER QUADRANT OF LEFT BREAST IN FEMALE, ESTROGEN RECEPTOR POSITIVE (HCC): ICD-10-CM

## 2020-05-11 DIAGNOSIS — D50.8 IRON DEFICIENCY ANEMIA SECONDARY TO INADEQUATE DIETARY IRON INTAKE: ICD-10-CM

## 2020-05-11 LAB
ALBUMIN SERPL-MCNC: 4 G/DL (ref 3.4–5)
ALBUMIN/GLOB SERPL: 1.1 {RATIO} (ref 0.8–1.7)
ALP SERPL-CCNC: 103 U/L (ref 45–117)
ALT SERPL-CCNC: 17 U/L (ref 13–56)
ANION GAP SERPL CALC-SCNC: 3 MMOL/L (ref 3–18)
AST SERPL-CCNC: 14 U/L (ref 10–38)
BASO+EOS+MONOS # BLD AUTO: 0.3 K/UL (ref 0–2.3)
BASO+EOS+MONOS NFR BLD AUTO: 5 % (ref 0.1–17)
BILIRUB SERPL-MCNC: 0.9 MG/DL (ref 0.2–1)
BUN SERPL-MCNC: 19 MG/DL (ref 7–18)
BUN/CREAT SERPL: 25 (ref 12–20)
CALCIUM SERPL-MCNC: 9 MG/DL (ref 8.5–10.1)
CHLORIDE SERPL-SCNC: 105 MMOL/L (ref 100–111)
CO2 SERPL-SCNC: 31 MMOL/L (ref 21–32)
CREAT SERPL-MCNC: 0.76 MG/DL (ref 0.6–1.3)
DIFFERENTIAL METHOD BLD: ABNORMAL
ERYTHROCYTE [DISTWIDTH] IN BLOOD BY AUTOMATED COUNT: 13.4 % (ref 11.5–14.5)
FERRITIN SERPL-MCNC: 116 NG/ML (ref 8–388)
GLOBULIN SER CALC-MCNC: 3.6 G/DL (ref 2–4)
GLUCOSE SERPL-MCNC: 87 MG/DL (ref 74–99)
HCT VFR BLD AUTO: 34.9 % (ref 36–48)
HGB BLD-MCNC: 11.6 G/DL (ref 12–16)
IRON SATN MFR SERPL: 17 % (ref 20–50)
IRON SERPL-MCNC: 56 UG/DL (ref 50–175)
LYMPHOCYTES # BLD: 1.4 K/UL (ref 1.1–5.9)
LYMPHOCYTES NFR BLD: 26 % (ref 14–44)
MCH RBC QN AUTO: 30.2 PG (ref 25–35)
MCHC RBC AUTO-ENTMCNC: 33.2 G/DL (ref 31–37)
MCV RBC AUTO: 90.9 FL (ref 78–102)
NEUTS SEG # BLD: 3.6 K/UL (ref 1.8–9.5)
NEUTS SEG NFR BLD: 69 % (ref 40–70)
PLATELET # BLD AUTO: 196 K/UL (ref 135–420)
POTASSIUM SERPL-SCNC: 3.7 MMOL/L (ref 3.5–5.5)
PROT SERPL-MCNC: 7.6 G/DL (ref 6.4–8.2)
RBC # BLD AUTO: 3.84 M/UL (ref 4.1–5.1)
SODIUM SERPL-SCNC: 139 MMOL/L (ref 136–145)
TIBC SERPL-MCNC: 324 UG/DL (ref 250–450)
WBC # BLD AUTO: 5.3 K/UL (ref 4.5–13)

## 2020-05-11 PROCEDURE — 36415 COLL VENOUS BLD VENIPUNCTURE: CPT

## 2020-05-11 PROCEDURE — 86300 IMMUNOASSAY TUMOR CA 15-3: CPT

## 2020-05-11 PROCEDURE — 83540 ASSAY OF IRON: CPT

## 2020-05-11 PROCEDURE — 82728 ASSAY OF FERRITIN: CPT

## 2020-05-11 PROCEDURE — 80053 COMPREHEN METABOLIC PANEL: CPT

## 2020-05-11 NOTE — LETTER
5/11/20 Patient: Dylan Muhammad YOB: 1955 Date of Visit: 5/11/2020 Romy Bobby MD 
[address] [place] [id number] VIA Facsimile: [phone] Dear Romy Bobby MD, Thank you for referring Ms. Dylan Muhammad to [facility] for evaluation. My notes for this consultation are attached. If you have questions, please do not hesitate to call me. I look forward to following your patient along with you. Sincerely, Kaylyn Woo MD

## 2020-05-12 ENCOUNTER — DOCUMENTATION ONLY (OUTPATIENT)
Dept: ONCOLOGY | Age: 65
End: 2020-05-12

## 2020-05-13 ENCOUNTER — HOSPITAL ENCOUNTER (OUTPATIENT)
Dept: LAB | Age: 65
Discharge: HOME | End: 2020-05-13
Payer: MEDICARE

## 2020-05-13 DIAGNOSIS — D50.9 IRON DEFICIENCY ANEMIA, UNSPECIFIED: ICD-10-CM

## 2020-05-13 LAB
BASOPHILS # BLD: 0 K/UL (ref 0–0.1)
BASOPHILS NFR BLD: 1 % (ref 0–2)
DIFFERENTIAL METHOD BLD: ABNORMAL
EOSINOPHIL # BLD: 0.1 K/UL (ref 0–0.4)
EOSINOPHIL NFR BLD: 2 % (ref 0–5)
ERYTHROCYTE [DISTWIDTH] IN BLOOD BY AUTOMATED COUNT: 13.9 % (ref 11.6–14.5)
FERRITIN SERPL-MCNC: 131 NG/ML (ref 8–388)
HCT VFR BLD AUTO: 34.4 % (ref 35–45)
HGB BLD-MCNC: 11.5 G/DL (ref 12–16)
IRON SERPL-MCNC: 112 UG/DL (ref 50–175)
LYMPHOCYTES # BLD: 1.2 K/UL (ref 0.9–3.6)
LYMPHOCYTES NFR BLD: 28 % (ref 21–52)
MCH RBC QN AUTO: 30.1 PG (ref 24–34)
MCHC RBC AUTO-ENTMCNC: 33.4 G/DL (ref 31–37)
MCV RBC AUTO: 90.1 FL (ref 74–97)
MONOCYTES # BLD: 0.4 K/UL (ref 0.05–1.2)
MONOCYTES NFR BLD: 10 % (ref 3–10)
NEUTS SEG # BLD: 2.4 K/UL (ref 1.8–8)
NEUTS SEG NFR BLD: 59 % (ref 40–73)
PLATELET # BLD AUTO: 189 K/UL (ref 135–420)
PMV BLD AUTO: 12.4 FL (ref 9.2–11.8)
RBC # BLD AUTO: 3.82 M/UL (ref 4.2–5.3)
WBC # BLD AUTO: 4.1 K/UL (ref 4.6–13.2)

## 2020-05-13 PROCEDURE — 83540 ASSAY OF IRON: CPT

## 2020-05-13 PROCEDURE — 36415 COLL VENOUS BLD VENIPUNCTURE: CPT

## 2020-05-13 PROCEDURE — 85025 COMPLETE CBC W/AUTO DIFF WBC: CPT

## 2020-05-13 PROCEDURE — 84466 ASSAY OF TRANSFERRIN: CPT

## 2020-05-13 PROCEDURE — 82728 ASSAY OF FERRITIN: CPT

## 2020-05-14 LAB
CANCER AG27-29 SERPL-ACNC: 18.2 U/ML (ref 0–38.6)
TRANSFERRIN SERPL-MCNC: 243 MG/DL (ref 192–364)

## 2020-05-27 ENCOUNTER — OFFICE VISIT (OUTPATIENT)
Dept: CARDIOLOGY CLINIC | Age: 65
End: 2020-05-27

## 2020-05-27 DIAGNOSIS — R07.9 CHEST PAIN, UNSPECIFIED TYPE: Primary | ICD-10-CM

## 2020-05-27 NOTE — PROGRESS NOTES
Parish Hurtado    Perioperative risk stratification for colonoscopy  \"all my vitals dropped\"  Dizziness    TREVIN Hurtado is a 76 y.o. -American female with no known coronary disease here for perioperative or stratification prior to colonoscopy. As you know this is a very active patient whose only chronic medical problem is iron deficiency anemia and I understand she is currently undergoing a work-up for this. She has absolutely no chest pain has never passed out no palpitations or lower extremity edema. She does tell me she is lost 60 pounds most recently and was on metformin for short amount of time but after the weight loss was able to come off of it. She was considered prediabetic for a few years but again recently lost quite a bit of weight. She is only on losartan for blood pressure and has been on this amount of losartan even when she was heavier. More recently she has noticed that if she changes positions too quickly even could be standing and suddenly can feel a bit lightheaded but for the last week she complains of dizziness. She also admits to chest pressure in the center of her chest. She says its dull and thinks only lasts for minutes- no provoking factors or assoc symptoms. It happened while driving her today:    CV RFs; preDM?, HTN, age, breast CA    Past Medical History:   Diagnosis Date    Anemia        Past Surgical History:   Procedure Laterality Date    BREAST SURGERY PROCEDURE UNLISTED      HX HYSTERECTOMY         Current Outpatient Medications   Medication Sig Dispense Refill    losartan (COZAAR) 50 mg tablet Take 1 tablet by mouth nightly. Indications: high blood pressure 30 Tab 3    letrozole (FEMARA) 2.5 mg tablet TAKE ONE TABLET BY MOUTH ONCE DAILY 90 Tab 3    cholecalciferol (VITAMIN D3) 1,000 unit tablet 1,000 Units.  Ferrous Fumarate 325 mg (106 mg iron) tab Take  by Mouth Once a Day.  docosahexanoic acid/epa (FISH OIL PO) Take  by Mouth Once a Day.       multivitamin (ONE A DAY) tablet Take 1 Tab by Mouth Once a Day.  simvastatin (ZOCOR) 40 mg tablet 20 mg.      triamterene-hydroCHLOROthiazide (MAXZIDE) 75-50 mg per tablet       ciclopirox (LOPROX) 0.77 % topical cream APPLY CREAM TO SOLES AND SIDES OF FEET TWICE DAILY  99    terbinafine HCl (LAMISIL) 250 mg tablet TAKE 1 TABLET BY MOUTH ONCE DAILY  0    metFORMIN (GLUMETZA ER) 500 mg TG24 24 hour tablet Take 1 Tab by Mouth Once a Day.          No Known Allergies    Social History     Socioeconomic History    Marital status:      Spouse name: Not on file    Number of children: Not on file    Years of education: Not on file    Highest education level: Not on file   Occupational History    Not on file   Social Needs    Financial resource strain: Not on file    Food insecurity     Worry: Not on file     Inability: Not on file    Transportation needs     Medical: Not on file     Non-medical: Not on file   Tobacco Use    Smoking status: Never Smoker    Smokeless tobacco: Never Used   Substance and Sexual Activity    Alcohol use: No    Drug use: Not on file    Sexual activity: Not on file   Lifestyle    Physical activity     Days per week: Not on file     Minutes per session: Not on file    Stress: Not on file   Relationships    Social connections     Talks on phone: Not on file     Gets together: Not on file     Attends Islam service: Not on file     Active member of club or organization: Not on file     Attends meetings of clubs or organizations: Not on file     Relationship status: Not on file    Intimate partner violence     Fear of current or ex partner: Not on file     Emotionally abused: Not on file     Physically abused: Not on file     Forced sexual activity: Not on file   Other Topics Concern    Not on file   Social History Narrative    Not on file        FH: neg premature CAD, no SCD    Review of Systems    14 pt Review of Systems is negative unless otherwise mentioned in the HPI. Wt Readings from Last 3 Encounters:   05/11/20 83 kg (183 lb)   03/06/20 82.1 kg (181 lb)   02/26/20 80.3 kg (177 lb)     Temp Readings from Last 3 Encounters:   05/11/20 98.6 °F (37 °C) (Oral)   02/03/20 98.2 °F (36.8 °C) (Oral)   07/22/19 96 °F (35.6 °C) (Oral)     BP Readings from Last 3 Encounters:   05/11/20 165/69   03/06/20 122/62   02/26/20 157/65     Pulse Readings from Last 3 Encounters:   05/11/20 64   03/06/20 (!) 56   02/03/20 60       02/26/20   ECHO ADULT COMPLETE 02/26/2020 2/26/2020    Narrative · Normal cavity size, wall thickness and systolic function (ejection   fraction normal). Estimated left ventricular ejection fraction is 55 -   60%. Visually measured ejection fraction. No regional wall motion   abnormality noted. Age-appropriate left ventricular diastolic function. · Mildly dilated left atrium. · Trace mitral valve regurgitation is present. Signed by: Aranza Albarran MD       Physical Exam:    There were no vitals taken for this visit. Physical Exam  HENT:      Head: Normocephalic and atraumatic. Eyes:      Pupils: Pupils are equal, round, and reactive to light. Cardiovascular:      Rate and Rhythm: Normal rate and regular rhythm. Heart sounds: Normal heart sounds. No murmur. No friction rub. No gallop. Pulmonary:      Effort: Pulmonary effort is normal. No respiratory distress. Breath sounds: Normal breath sounds. No wheezing or rales. Chest:      Chest wall: No tenderness. Abdominal:      General: Bowel sounds are normal.      Palpations: Abdomen is soft. Musculoskeletal:         General: No tenderness. Skin:     General: Skin is warm and dry. Neurological:      Mental Status: She is alert and oriented to person, place, and time.          EKG today shows: NSR, normal axis and intervals, no ST segment abnormalities    Lab Results   Component Value Date/Time    WBC 4.1 (L) 05/13/2020 12:29 PM    HGB 11.5 (L) 05/13/2020 12:29 PM    HCT 34.4 (L) 05/13/2020 12:29 PM    PLATELET 774 76/01/4890 12:29 PM    MCV 90.1 05/13/2020 12:29 PM     Lab Results   Component Value Date/Time    Sodium 139 05/11/2020 10:51 AM    Potassium 3.7 05/11/2020 10:51 AM    Chloride 105 05/11/2020 10:51 AM    CO2 31 05/11/2020 10:51 AM    Anion gap 3 05/11/2020 10:51 AM    Glucose 87 05/11/2020 10:51 AM    BUN 19 (H) 05/11/2020 10:51 AM    Creatinine 0.76 05/11/2020 10:51 AM    BUN/Creatinine ratio 25 (H) 05/11/2020 10:51 AM    GFR est AA >60 05/11/2020 10:51 AM    GFR est non-AA >60 05/11/2020 10:51 AM    Calcium 9.0 05/11/2020 10:51 AM    Bilirubin, total 0.9 05/11/2020 10:51 AM    Alk.  phosphatase 103 05/11/2020 10:51 AM    Protein, total 7.6 05/11/2020 10:51 AM    Albumin 4.0 05/11/2020 10:51 AM    Globulin 3.6 05/11/2020 10:51 AM    A-G Ratio 1.1 05/11/2020 10:51 AM    ALT (SGPT) 17 05/11/2020 10:51 AM     No results found for: TSH, TSH2, TSH3, TSHP, TSHEXT      Impression and Plan:  Rita Holley is a 76 y.o. with:    1.) Perioperative risk stratification prior to colonoscopy  2.) Dizziness/ \"vitals dropping\" likely stress reaction/ autonomic dysfunction that her BP med may be contributing to  3.) Recent 60 lb wt loss with h/o preDM  4.) HTN  5.) Normal LV function  6.) preDM, was on Metformin for years  7.) New chest pain, atypical    1.) Set up urgent pharm nuc due to chest pain  2.) Will call with result and if negative, can proceed to low risk procedure  3.) Echo, EKG and CV exam WNL  4.) BP logs from home WNL and unlikely contributing to symptoms, would continue current dose of Losartan  5.) RTC ~4 months routine follow up    Pt says has had many procedures and never had a reaction like before  Despite me cutting her Losartan in half, still c/o dizziness and now CP  Will further risk stratify with nuc since new symptoms and coordinate plan pending results    Thank you for allowing me to participate in the care of your patient, please do not hesitate to call with questions or concerns.     Kindest Regards,    Yoan Martinez, DO

## 2020-05-27 NOTE — PATIENT INSTRUCTIONS
Pharm nuc for chest pain Follow up 4 months If you have not heard from the central scheduler to schedule your testing in 48 hours, please call [phone].

## 2020-05-29 ENCOUNTER — HOSPITAL ENCOUNTER (OUTPATIENT)
Dept: NON INVASIVE DIAGNOSTICS | Age: 65
Discharge: HOME | End: 2020-05-29
Attending: INTERNAL MEDICINE
Payer: MEDICARE

## 2020-05-29 VITALS
BODY MASS INDEX: 27.11 KG/M2 | DIASTOLIC BLOOD PRESSURE: 92 MMHG | SYSTOLIC BLOOD PRESSURE: 172 MMHG | WEIGHT: 183 LBS | HEIGHT: 69 IN

## 2020-05-29 DIAGNOSIS — R07.9 CHEST PAIN, UNSPECIFIED TYPE: ICD-10-CM

## 2020-05-29 LAB
STRESS BASELINE DIAS BP: 92 MMHG
STRESS BASELINE HR: 67 BPM
STRESS BASELINE SYS BP: 172 MMHG
STRESS ESTIMATED WORKLOAD: 1.6 METS
STRESS EXERCISE DUR MIN: NORMAL
STRESS PEAK DIAS BP: 92 MMHG
STRESS PEAK SYS BP: 172 MMHG
STRESS PERCENT HR ACHIEVED: 70 %
STRESS POST PEAK HR: 108 BPM
STRESS RATE PRESSURE PRODUCT: NORMAL BPM*MMHG
STRESS TARGET HR: 154 BPM

## 2020-05-29 PROCEDURE — 74011250636 HC RX REV CODE- 250/636: Performed by: INTERNAL MEDICINE

## 2020-05-29 PROCEDURE — 93017 CV STRESS TEST TRACING ONLY: CPT

## 2020-05-29 RX ORDER — SODIUM CHLORIDE 9 MG/ML
250 INJECTION, SOLUTION INTRAVENOUS ONCE
Status: COMPLETED | OUTPATIENT
Start: 2020-05-29 | End: 2020-05-29

## 2020-05-29 RX ADMIN — REGADENOSON 0.4 MG: 0.08 INJECTION, SOLUTION INTRAVENOUS at 11:30

## 2020-05-29 RX ADMIN — SODIUM CHLORIDE 250 ML: 900 INJECTION, SOLUTION INTRAVENOUS at 11:30

## 2020-05-29 NOTE — PROGRESS NOTES
Per your last note\" Patricia Myers is a 76 y.o. with:     1.) Perioperative risk stratification prior to colonoscopy  2.) Dizziness/ \"vitals dropping\" likely stress reaction/ autonomic dysfunction that her BP med may be contributing to  3.) Recent 60 lb wt loss with h/o preDM  4.) HTN  5.) Normal LV function  6.) preDM, was on Metformin for years  7.) New chest pain, atypical     1.) Set up urgent pharm nuc due to chest pain  2.) Will call with result and if negative, can proceed to low risk procedure  3.) Echo, EKG and CV exam WNL  4.) BP logs from home WNL and unlikely contributing to symptoms, would continue current dose of Losartan  5.) RTC ~4 months routine follow up     Pt says has had many procedures and never had a reaction like before  Despite me cutting her Losartan in half, still c/o dizziness and now CP  Will further risk stratify with nuc since new symptoms and coordinate plan pending results

## 2020-05-29 NOTE — PROGRESS NOTES
Patient was given 10.25 milliCuries of 99mTc-Sestamibi for the resting images. Patient was also given 33.0 milliCuries of 99mTc-Sestamibi for the stress images. Injected with 0.4mg Lexiscan. Patient's armband was discarded and shredded.

## 2020-06-03 ENCOUNTER — TELEPHONE (OUTPATIENT)
Dept: CARDIOLOGY CLINIC | Age: 65
End: 2020-06-03

## 2020-06-03 NOTE — LETTER
6/3/2020 1:26 PM 
 
Ms. Kristie Grewal [address] [id number] [facility] Kristie Grewal was seen in our office on 5/27/2020 for cardiac evaluation. From a cardiac standpoint she can proceed for her endoscopy, and is low to moderate risk from a CV standpoint . Please feel free to contact our office if you have any questions regarding this patient. Sincerely, Yissel Drew, DO

## 2020-06-03 NOTE — TELEPHONE ENCOUNTER
----- Message from Clau Cruz DO sent at 6/3/2020  1:23 PM EDT -----  Please let her know her stress test was normal/ low risk  She can proceed for her endoscopy, and is low to moderate risk from a CV standpoint  ----- Message -----  From: Savanah Miguel LPN  Sent: 2/28/4405   3:46 PM EDT  To: Clau Cruz DO    Per your last note\" Flip Berrios is a 76 y.o. with:     1.) Perioperative risk stratification prior to colonoscopy  2.) Dizziness/ \"vitals dropping\" likely stress reaction/ autonomic dysfunction that her BP med may be contributing to  3.) Recent 60 lb wt loss with h/o preDM  4.) HTN  5.) Normal LV function  6.) preDM, was on Metformin for years  7.) New chest pain, atypical     1.) Set up urgent pharm nuc due to chest pain  2.) Will call with result and if negative, can proceed to low risk procedure  3.) Echo, EKG and CV exam WNL  4.) BP logs from home WNL and unlikely contributing to symptoms, would continue current dose of Losartan  5.) RTC ~4 months routine follow up     Pt says has had many procedures and never had a reaction like before  Despite me cutting her Losartan in half, still c/o dizziness and now CP  Will further risk stratify with nuc since new symptoms and coordinate plan pending results

## 2020-06-10 LAB — MAMMOGRAPHY, EXTERNAL: NORMAL

## 2020-07-01 RX ORDER — LETROZOLE 2.5 MG/1
TABLET, FILM COATED ORAL
Qty: 90 TAB | Refills: 0 | Status: SHIPPED | OUTPATIENT
Start: 2020-07-01 | End: 2020-09-30 | Stop reason: SDUPTHER

## 2020-07-06 DIAGNOSIS — D50.8 IRON DEFICIENCY ANEMIA SECONDARY TO INADEQUATE DIETARY IRON INTAKE: ICD-10-CM

## 2020-07-06 DIAGNOSIS — C50.212 MALIGNANT NEOPLASM OF UPPER-INNER QUADRANT OF LEFT FEMALE BREAST, UNSPECIFIED ESTROGEN RECEPTOR STATUS (HCC): ICD-10-CM

## 2020-07-06 RX ORDER — LOSARTAN POTASSIUM 50 MG/1
TABLET ORAL
Qty: 30 TAB | Refills: 0 | Status: SHIPPED | OUTPATIENT
Start: 2020-07-06

## 2020-07-09 ENCOUNTER — HOSPITAL ENCOUNTER (OUTPATIENT)
Dept: BONE DENSITY | Age: 65
Discharge: HOME | End: 2020-07-09
Attending: INTERNAL MEDICINE
Payer: MEDICARE

## 2020-07-09 DIAGNOSIS — Z13.820 SPECIAL SCREENING FOR OSTEOPOROSIS: ICD-10-CM

## 2020-07-09 DIAGNOSIS — M85.80 OSTEOPENIA: ICD-10-CM

## 2020-07-09 DIAGNOSIS — R89.1 ABNORMAL LEVEL OF HORMONES IN SPECIMENS FROM OTH ORG/TISS: ICD-10-CM

## 2020-07-09 DIAGNOSIS — Z78.0 POSTMENOPAUSAL: ICD-10-CM

## 2020-07-09 PROCEDURE — 77080 DXA BONE DENSITY AXIAL: CPT

## 2020-08-05 ENCOUNTER — HOSPITAL ENCOUNTER (OUTPATIENT)
Dept: LAB | Age: 65
Discharge: HOME | End: 2020-08-05
Payer: MEDICARE

## 2020-08-05 ENCOUNTER — LAB ONLY (OUTPATIENT)
Dept: ONCOLOGY | Age: 65
End: 2020-08-05

## 2020-08-05 DIAGNOSIS — D50.8 IRON DEFICIENCY ANEMIA SECONDARY TO INADEQUATE DIETARY IRON INTAKE: ICD-10-CM

## 2020-08-05 DIAGNOSIS — C50.212 MALIGNANT NEOPLASM OF UPPER-INNER QUADRANT OF LEFT BREAST IN FEMALE, ESTROGEN RECEPTOR POSITIVE (HCC): Primary | ICD-10-CM

## 2020-08-05 DIAGNOSIS — Z17.0 MALIGNANT NEOPLASM OF UPPER-INNER QUADRANT OF LEFT BREAST IN FEMALE, ESTROGEN RECEPTOR POSITIVE (HCC): Primary | ICD-10-CM

## 2020-08-05 DIAGNOSIS — C50.212 MALIGNANT NEOPLASM OF UPPER-INNER QUADRANT OF LEFT BREAST IN FEMALE, ESTROGEN RECEPTOR POSITIVE (HCC): ICD-10-CM

## 2020-08-05 DIAGNOSIS — C50.212 MALIGNANT NEOPLASM OF UPPER-INNER QUADRANT OF LEFT FEMALE BREAST, UNSPECIFIED ESTROGEN RECEPTOR STATUS (HCC): ICD-10-CM

## 2020-08-05 DIAGNOSIS — Z17.0 MALIGNANT NEOPLASM OF UPPER-INNER QUADRANT OF LEFT BREAST IN FEMALE, ESTROGEN RECEPTOR POSITIVE (HCC): ICD-10-CM

## 2020-08-05 LAB
ALBUMIN SERPL-MCNC: 4 G/DL (ref 3.4–5)
ALBUMIN/GLOB SERPL: 1.2 {RATIO} (ref 0.8–1.7)
ALP SERPL-CCNC: 103 U/L (ref 45–117)
ALT SERPL-CCNC: 19 U/L (ref 13–56)
ANION GAP SERPL CALC-SCNC: 6 MMOL/L (ref 3–18)
AST SERPL-CCNC: 17 U/L (ref 10–38)
BASOPHILS # BLD: 0 K/UL (ref 0–0.1)
BASOPHILS NFR BLD: 0 % (ref 0–2)
BILIRUB SERPL-MCNC: 0.5 MG/DL (ref 0.2–1)
BUN SERPL-MCNC: 16 MG/DL (ref 7–18)
BUN/CREAT SERPL: 22 (ref 12–20)
CALCIUM SERPL-MCNC: 9.2 MG/DL (ref 8.5–10.1)
CHLORIDE SERPL-SCNC: 106 MMOL/L (ref 100–111)
CO2 SERPL-SCNC: 31 MMOL/L (ref 21–32)
CREAT SERPL-MCNC: 0.74 MG/DL (ref 0.6–1.3)
DIFFERENTIAL METHOD BLD: ABNORMAL
EOSINOPHIL # BLD: 0.1 K/UL (ref 0–0.4)
EOSINOPHIL NFR BLD: 2 % (ref 0–5)
ERYTHROCYTE [DISTWIDTH] IN BLOOD BY AUTOMATED COUNT: 15 % (ref 11.6–14.5)
FERRITIN SERPL-MCNC: 147 NG/ML (ref 8–388)
GLOBULIN SER CALC-MCNC: 3.4 G/DL (ref 2–4)
GLUCOSE SERPL-MCNC: 85 MG/DL (ref 74–99)
HCT VFR BLD AUTO: 34.1 % (ref 35–45)
HGB BLD-MCNC: 11.2 G/DL (ref 12–16)
IRON SATN MFR SERPL: 17 % (ref 20–50)
IRON SERPL-MCNC: 52 UG/DL (ref 50–175)
LYMPHOCYTES # BLD: 1.4 K/UL (ref 0.9–3.6)
LYMPHOCYTES NFR BLD: 27 % (ref 21–52)
MCH RBC QN AUTO: 30 PG (ref 24–34)
MCHC RBC AUTO-ENTMCNC: 32.8 G/DL (ref 31–37)
MCV RBC AUTO: 91.4 FL (ref 74–97)
MONOCYTES # BLD: 0.5 K/UL (ref 0.05–1.2)
MONOCYTES NFR BLD: 9 % (ref 3–10)
NEUTS SEG # BLD: 3.2 K/UL (ref 1.8–8)
NEUTS SEG NFR BLD: 62 % (ref 40–73)
PLATELET # BLD AUTO: 193 K/UL (ref 135–420)
PMV BLD AUTO: 12.6 FL (ref 9.2–11.8)
POTASSIUM SERPL-SCNC: 4.1 MMOL/L (ref 3.5–5.5)
PROT SERPL-MCNC: 7.4 G/DL (ref 6.4–8.2)
RBC # BLD AUTO: 3.73 M/UL (ref 4.2–5.3)
SODIUM SERPL-SCNC: 143 MMOL/L (ref 136–145)
TIBC SERPL-MCNC: 313 UG/DL (ref 250–450)
WBC # BLD AUTO: 5.2 K/UL (ref 4.6–13.2)

## 2020-08-05 PROCEDURE — 85025 COMPLETE CBC W/AUTO DIFF WBC: CPT

## 2020-08-05 PROCEDURE — 82728 ASSAY OF FERRITIN: CPT

## 2020-08-05 PROCEDURE — 86300 IMMUNOASSAY TUMOR CA 15-3: CPT

## 2020-08-05 PROCEDURE — 36415 COLL VENOUS BLD VENIPUNCTURE: CPT

## 2020-08-05 PROCEDURE — 80053 COMPREHEN METABOLIC PANEL: CPT

## 2020-08-05 PROCEDURE — 83540 ASSAY OF IRON: CPT

## 2020-08-07 LAB — CANCER AG27-29 SERPL-ACNC: 6.2 U/ML (ref 0–38.6)

## 2020-08-12 ENCOUNTER — OFFICE VISIT (OUTPATIENT)
Dept: ONCOLOGY | Age: 65
End: 2020-08-12

## 2020-08-12 VITALS
BODY MASS INDEX: 26.88 KG/M2 | OXYGEN SATURATION: 100 % | DIASTOLIC BLOOD PRESSURE: 81 MMHG | HEART RATE: 60 BPM | SYSTOLIC BLOOD PRESSURE: 147 MMHG | WEIGHT: 182 LBS | RESPIRATION RATE: 16 BRPM

## 2020-08-12 DIAGNOSIS — D50.8 IRON DEFICIENCY ANEMIA SECONDARY TO INADEQUATE DIETARY IRON INTAKE: Primary | ICD-10-CM

## 2020-08-12 DIAGNOSIS — Z17.0 MALIGNANT NEOPLASM OF UPPER-INNER QUADRANT OF LEFT BREAST IN FEMALE, ESTROGEN RECEPTOR POSITIVE (HCC): ICD-10-CM

## 2020-08-12 DIAGNOSIS — Z79.811 LONG TERM (CURRENT) USE OF AROMATASE INHIBITORS: ICD-10-CM

## 2020-08-12 DIAGNOSIS — C50.212 MALIGNANT NEOPLASM OF UPPER-INNER QUADRANT OF LEFT BREAST IN FEMALE, ESTROGEN RECEPTOR POSITIVE (HCC): ICD-10-CM

## 2020-08-12 RX ORDER — POLYETHYLENE GLYCOL-3350 AND ELECTROLYTES 236; 6.74; 5.86; 2.97; 22.74 G/274.31G; G/274.31G; G/274.31G; G/274.31G; G/274.31G
POWDER, FOR SOLUTION ORAL
COMMUNITY
Start: 2020-06-03

## 2020-08-12 NOTE — PROGRESS NOTES
Hematology/Oncology  Progress Note    Name: Shahid Dumont  Date: 2020   : 1955    PCP: Joseph Simmons MD     Ms. Medina Leal is a 76 y.o. female who was seen for management of her invasive ductal adenocarcinoma of the left breast.    Current therapy: Femara 2.5mg PO daily started May 2017    Subjective:     Mrs. Medina Leal is a 69-year-old woman who has a history of invasive ductal adenocarcinoma involving the left breast.  She is doing well. She continues to take Femara 2.5mg PO daily and is tolerating the treatment reasonably well. She has no new complaints or concerns to report. She is Up to date with her mammogram.  The patient reports that she continues to do breast self exams on a monthly basis. She denies any breast issues or breast pain at this time. Oncology History   Ms. Medina Leal is a 27-year-old -American woman who recently underwent her screening mammograms which showed an abnormality in the left breast which was concerning. This screening mammogram was completed on 3/6/2017. On 3/15/2017 she had a unilateral diagnostic mammogram and unilateral ultrasound which documented a lesion at the 11 o'clock position in the left breast.   The tumor measured 4 x 3 x 4 mm and was subsequently biopsied on 3/17/2017. The biopsy confirmed an invasive ductal adenocarcinoma, moderately well differentiated. There was no evidence of an in situ component and there was no lymphovascular invasion identified. Further tests on the specimen revealed that the tumor was estrogen receptor positive and progesterone receptor positive but HER-2/david negative. notes from her surgeon reveal she has clinically palpable left axillary lymph node and is tentatively scheduled to undergo further testing. She completed her lumpectomy and external beam radiation on 6/15/2017.  her tumor was hormone receptor positive and HER-2 negative.   She is postmenopausal and therefore antiestrogen therapy in the form of an aromatase inhibitor, Femara. The dose is 2.5 mg daily was started on May 2017         Past medical history, family history, and social history: these were reviewed and remains unchanged.     Past Medical History:   Diagnosis Date    Anemia      Past Surgical History:   Procedure Laterality Date    BREAST SURGERY PROCEDURE UNLISTED      HX HYSTERECTOMY       Social History     Socioeconomic History    Marital status:      Spouse name: Not on file    Number of children: Not on file    Years of education: Not on file    Highest education level: Not on file   Occupational History    Not on file   Social Needs    Financial resource strain: Not on file    Food insecurity     Worry: Not on file     Inability: Not on file    Transportation needs     Medical: Not on file     Non-medical: Not on file   Tobacco Use    Smoking status: Never Smoker    Smokeless tobacco: Never Used   Substance and Sexual Activity    Alcohol use: No    Drug use: Not on file    Sexual activity: Not on file   Lifestyle    Physical activity     Days per week: Not on file     Minutes per session: Not on file    Stress: Not on file   Relationships    Social connections     Talks on phone: Not on file     Gets together: Not on file     Attends Sikhism service: Not on file     Active member of club or organization: Not on file     Attends meetings of clubs or organizations: Not on file     Relationship status: Not on file    Intimate partner violence     Fear of current or ex partner: Not on file     Emotionally abused: Not on file     Physically abused: Not on file     Forced sexual activity: Not on file   Other Topics Concern    Not on file   Social History Narrative    Not on file     Family History   Problem Relation Age of Onset    Heart Disease Mother     Cancer Mother         colon    Diabetes Father     Cancer Brother         prostate    Alzheimer Paternal Uncle     Other Maternal Grandfather         old age   Luisana Romero Cancer Brother         colon    Alzheimer Cousin      Current Outpatient Medications   Medication Sig Dispense Refill    GasuhasLyte-G 236-22.74-6.74 -5.86 gram suspension AS PER PREP INSTRUCTIONS      losartan (COZAAR) 50 mg tablet TAKE 1 TABLET BY MOUTH NIGHTLY FOR HIGH BLOOD PRESSURE 30 Tab 0    letrozole (FEMARA) 2.5 mg tablet Take 1 tablet by mouth once daily 90 Tab 0    ciclopirox (LOPROX) 0.77 % topical cream APPLY CREAM TO SOLES AND SIDES OF FEET TWICE DAILY  99    terbinafine HCl (LAMISIL) 250 mg tablet TAKE 1 TABLET BY MOUTH ONCE DAILY  0    cholecalciferol (VITAMIN D3) 1,000 unit tablet 1,000 Units.  Ferrous Fumarate 325 mg (106 mg iron) tab Take  by Mouth Once a Day.  docosahexanoic acid/epa (FISH OIL PO) Take  by Mouth Once a Day.  metFORMIN (GLUMETZA ER) 500 mg TG24 24 hour tablet Take 1 Tab by Mouth Once a Day.  multivitamin (ONE A DAY) tablet Take 1 Tab by Mouth Once a Day.  simvastatin (ZOCOR) 40 mg tablet 20 mg.      triamterene-hydroCHLOROthiazide (MAXZIDE) 75-50 mg per tablet          Review of Systems  Constitutional: The patient has no acute distress or discomfort. HEENT: The patient denies recent head trauma, eye pain, blurred vision,  hearing deficit, oropharyngeal mucosal pain or lesions, and the patient denies throat pain or discomfort. Lymphatics: The patient denies palpable peripheral lymphadenopathy. Hematologic: The patient denies having bruising, bleeding, or progressive fatigue. Respiratory: Patient denies having shortness of breath, cough, sputum production, fever, or dyspnea on exertion. Cardiovascular: The patient denies having leg pain, leg swelling, heart palpitations, chest permit, chest pain, or lightheadedness. The patient denies having dyspnea on exertion. Gastrointestinal: The patient denies having nausea, emesis, or diarrhea. The patient denies having any hematemesis or blood in the stool.   Genitourinary: Patient denies having urinary urgency, frequency, or dysuria. The patient denies having blood in the urine. Psychological: The patient denies having symptoms of nervousness, anxiety, depression, or thoughts of harming self. Skin: Patient denies having skin rashes, skin, ulcerations, or unexplained itching or pruritus. Musculoskeletal: The patient denies having pain in the joints or bones. Objective:     Visit Vitals  /81   Pulse 60   Resp 16   Wt 82.6 kg (182 lb)   SpO2 100%   BMI 26.88 kg/m²     ECOG PS=0; Pain score=0/10    Physical Exam:   Gen. Appearance: The patient is in no acute distress. Skin: There is no bruise or rash. HEENT: The exam is unremarkable. Neck: Supple without lymphadenopathy or thyromegaly. Lungs: Clear to auscultation and percussion; there are no wheezes or rhonchi. Heart: Regular rate and rhythm; there are no murmurs, gallops, or rubs. Anterior chest wall and breast: there is no eveidence of disease recurrence. Axilla reveals no palpable lymphadenopathy. Abdomen: Bowel sounds are present and normal.  There is no guarding, tenderness, or hepatosplenomegaly. Extremities: There is no clubbing, cyanosis, or edema. Neurologic: There are no focal neurologic deficits. Lymphatics: There is no palpable peripheral lymphadenopathy. Musculoskeletal: The patient has full range of motion at all joints. There is no evidence of joint deformity or effusions. There is no focal joint tenderness. Psychological/psychiatric: There is no clinical evidence of anxiety, depression, or melancholy. Mammogram on 6/10/2020   No mammographic evidence of malignancy. Recommend annual screening mammography. A result letter will be sent to the patient.     The patient will be notified by the [facility] reminder system for scheduling of her annual screening mammogram.    Assessment Category 2: Benign    ZEKE Results (most recent):  Results from [place] encounter on 03/25/16   ZEKE Wiggins PROCEDURE:  Digital Mammograms - Bilateral Screening. CPT:  , [id number]    INDICATION:  Routine screening. COMPARISON:  1/12/15, 1/9/14, 7/8/13    FINDINGS:      - Technique:  Full-field direct digital CC and MLO views. - CAD:  Images are interpreted in conjunction with iCAD (Profusa version 7.2  high-setting). Any areas annotated on the CAD are correlated with visualized  mammographic findings using digital magnification of the images and correlation  with comparison studies.  - Breast parenchymal pattern:  Fatty replaced. No dominant mass, suspicious new microcalcification cluster or architectural  distortion is detected. No potential findings are detected on iCAD. No  significant interval changes are observed. Impression IMPRESSION:    Benign mammograms. Recommend screening mammograms in one year. BIRADS category:  2. Benign. Note: 10% to 15% of breast cancers may not be identified by mammography. Dense  breast parenchyma can obscure an underlying neoplasm and some types of breast  cancers are not well shown on the mammogram.  A negative mammogram report should  not delay further evaluation if clinically suspicious findings, i.e. palpable  firm nodules are present. Lab data: The pathology report dated 3/17/2017 of a left breast specimen is diagnostic for invasive ductal adenocarcinoma, moderately well-differentiated. Immunohistochemical stains for the estrogen receptor and progesterone receptor positive.   HER-2/david was negative      Results for orders placed or performed during the hospital encounter of 05/11/20   CBC WITH 3 PART DIFF     Status: Abnormal   Result Value Ref Range Status    WBC 5.3 4.5 - 13.0 K/uL Final    RBC 3.84 (L) 4.10 - 5.10 M/uL Final    HGB 11.6 (L) 12.0 - 16 g/dL Final    HCT 34.9 (L) 36 - 48 % Final    MCV 90.9 78 - 102 FL Final    MCH 30.2 25.0 - 35.0 PG Final    MCHC 33.2 31 - 37 g/dL Final    RDW 13.4 11.5 - 14.5 % Final NEUTROPHILS 69 40 - 70 % Final    MIXED CELLS 5 0.1 - 17 % Final    LYMPHOCYTES 26 14 - 44 % Final    ABS. NEUTROPHILS 3.6 1.8 - 9.5 K/UL Final    ABS. MIXED CELLS 0.3 0.0 - 2.3 K/uL Final    ABS. LYMPHOCYTES 1.4 1.1 - 5.9 K/UL Final     Comment: Test performed at [address] [place] [address] or Outpatient Infusion [facility] Location. Reviewed by Medical Director. DF AUTOMATED   Final           Assessment:     1. Iron deficiency anemia secondary to inadequate dietary iron intake    2. Malignant neoplasm of upper-inner quadrant of left breast in female, estrogen receptor positive ([facility] [address]    3. Long term (current) use of aromatase inhibitors      Plan:   Invasive ductal adenocarcinoma left breast;  3/17/2017 (Active) Stage X, T1a, NX, M0, G2   -- I have encouraged the patient to continue with her self breast exams. She will also continue with annual screening mammography. Recent mammogram on 6/10/2020 showed No mammographic evidence of malignancy. Recommend annual screening mammography. -- Femara 2.5mg PO daily will be continued. She would like to remain on the medication for full 10 years . -- Her previous CA 27-29 was WNL at 6.2.          Long-term current use of aromatase inhibitors:    Bone Health:   -- pt will continue taking calcium (at least 1,200 mg per day) and vitamin D(800-1,000 IU per day). The higher dose of vitamin D may be needed if vitamin D deficiency. -- Recommend regular weight-bearing and muscle-strengthening exercise to reduce the risk of falls and fractures. -- Advise avoidance of tobacco smoking and excessive alcohol intake.       Functional iron deficiency anemia:  -- The patient was advised to continue to take the Slow Fe 1 tablet daily. At this time I will recheck her iron profile and ferritin levels.   The patient reported she has follow-up with her GI and already scheduled colonoscopy next month.        -- I will see the patient back in clinic in about 3-4 months. Always sooner if required. The patient can have lab done prior our next clinic visit.       Orders Placed This Encounter    GaviLyte-G 236-22.74-6.74 -5.86 gram suspension     Sig: AS PER PREP INSTRUCTIONS       Deo Altman NP  8/12/2020

## 2020-08-12 NOTE — PATIENT INSTRUCTIONS
Anemia: Care Instructions Your Care Instructions Anemia is a low level of red blood cells, which carry oxygen throughout your body. Many things can cause anemia. Lack of iron is one of the most common causes. Your body needs iron to make hemoglobin, a substance in red blood cells that carries oxygen from the lungs to your body's cells. Without enough iron, the body produces fewer and smaller red blood cells. As a result, your body's cells do not get enough oxygen, and you feel tired and weak. And you may have trouble concentrating. Bleeding is the most common cause of a lack of iron. You may have heavy menstrual bleeding or bleeding caused by conditions such as ulcers, hemorrhoids, or cancer. Regular use of aspirin or other anti-inflammatory medicines (such as ibuprofen) also can cause bleeding in some people. A lack of iron in your diet also can cause anemia, especially at times when the body needs more iron, such as during pregnancy, infancy, and the teen years. Your doctor may have prescribed iron pills. It may take several months of treatment for your iron levels to return to normal. Your doctor also may suggest that you eat foods that are rich in iron, such as meat and beans. There are many other causes of anemia. It is not always due to a lack of iron. Finding the specific cause of your anemia will help your doctor find the right treatment for you. Follow-up care is a key part of your treatment and safety. Be sure to make and go to all appointments, and call your doctor if you are having problems. It's also a good idea to know your test results and keep a list of the medicines you take. How can you care for yourself at home? · Take your medicines exactly as prescribed. Call your doctor if you think you are having a problem with your medicine. · If your doctor recommends iron pills, take them as directed: ? Try to take the pills on an empty stomach about 1 hour before or 2 hours after meals. But you may need to take iron with food to avoid an upset stomach. ? Do not take antacids or drink milk or caffeine drinks (such as coffee, tea, or cola) at the same time or within 2 hours of the time that you take your iron. They can make it hard for your body to absorb the iron. ? Vitamin C (from food or supplements) helps your body absorb iron. Try taking iron pills with a glass of orange juice or some other food that is high in vitamin C, such as citrus fruits. ? Iron pills may cause stomach problems, such as heartburn, nausea, diarrhea, constipation, and cramps. Be sure to drink plenty of fluids, and include fruits, vegetables, and fiber in your diet each day. Iron pills often make your bowel movements dark or green. ? If you forget to take an iron pill, do not take a double dose of iron the next time you take a pill. ? Keep iron pills out of the reach of small children. An overdose of iron can be very dangerous. · Follow your doctor's advice about eating iron-rich foods. These include red meat, shellfish, poultry, eggs, beans, raisins, whole-grain bread, and leafy green vegetables. · Steam vegetables to help them keep their iron content. When should you call for help? [id number] anytime you think you may need emergency care. For example, call if: 
· You have symptoms of a heart attack. These may include: 
? Chest pain or pressure, or a strange feeling in the chest. 
? Sweating. ? Shortness of breath. ? Nausea or vomiting. ? Pain, pressure, or a strange feeling in the back, neck, jaw, or upper belly or in one or both shoulders or arms. ? Lightheadedness or sudden weakness. ? A fast or irregular heartbeat. After you call 911, the  may tell you to chew 1 adult-strength or 2 to 4 low-dose aspirin. Wait for an ambulance. Do not try to drive yourself. · You passed out (lost consciousness). Call your doctor now or seek immediate medical care if: · You have new or increased shortness of breath. · You are dizzy or lightheaded, or you feel like you may faint. · Your fatigue and weakness continue or get worse. · You have any abnormal bleeding, such as: 
? Nosebleeds. ? Vaginal bleeding that is different (heavier, more frequent, at a different time of the month) than what you are used to. 
? Bloody or black stools, or rectal bleeding. ? Bloody or pink urine. Watch closely for changes in your health, and be sure to contact your doctor if: 
· You do not get better as expected. Where can you learn more? Go to http://ashlie-funmilayo.info/ Enter R301 in the search box to learn more about \"Anemia: Care Instructions. \" Current as of: November 8, 2019               Content Version: 12.5 © 3853-5146 [employer]. Care instructions adapted under license by [employer] (which disclaims liability or warranty for this information). If you have questions about a medical condition or this instruction, always ask your healthcare professional. [place] [address] any warranty or liability for your use of this information. Letrozole (By mouth) Letrozole (LET-julio cesar-zole) Treats breast cancer. Brand Name(s): MihaiaraYary Femara Co-Pack There may be other brand names for this medicine. When This Medicine Should Not Be Used: This medicine is not right for everyone. Do not use it if you had an allergic reaction to letrozole, or if you are pregnant. How to Use This Medicine:  
Tablet · Your doctor will tell you how much medicine to use. Do not use more than directed. · Missed dose: This medicine needs to be given on a fixed schedule. If you miss a dose, call your doctor for instructions. · Store the medicine in a closed container at room temperature, away from heat, moisture, and direct light. Drugs and Foods to Avoid: Ask your doctor or pharmacist before using any other medicine, including over-the-counter medicines, vitamins, and herbal products. · Some medicines can affect how letrozole works. Tell your doctor if you are also using tamoxifen. Warnings While Using This Medicine: · It is not safe to take this medicine during pregnancy. It could harm an unborn baby. Tell your doctor right away if you become pregnant. Use an effective form of birth control during treatment with this medicine and for at least 3 weeks after the last dose. · Do not breastfeed while you are taking this medicine and for at least 3 weeks after your last dose. · Tell your doctor if you have liver disease (including cirrhosis), bone problems (including osteoporosis), or high cholesterol in the blood. · This medicine may cause the following problems: 
¨ Low bone mineral density ¨ High cholesterol or fat levels in the blood ¨ Liver problems · This medicine may make you dizzy, drowsy, or tired. Do not drive or do anything else that could be dangerous until you know how this medicine affects you. · This medicine could cause infertility. Talk with your doctor before using this medicine if you plan to have children. · Medicines used to treat cancer are very strong and can have many side effects. Before receiving this medicine, make sure you understand all the risks and benefits. It is important for you to work closely with your doctor during your treatment. · Your doctor will do lab tests at regular visits to check on the effects of this medicine. Keep all appointments. · Keep all medicine out of the reach of children. Never share your medicine with anyone. Possible Side Effects While Using This Medicine:  
Call your doctor right away if you notice any of these side effects: · Allergic reaction: Itching or hives, swelling in your face or hands, swelling or tingling in your mouth or throat, chest tightness, trouble breathing · Bone pain · Chest pain, trouble breathing, coughing up blood · Dark urine, pale stools, nausea, vomiting, loss of appetite, stomach pain, yellow skin or eyes · Numbness or weakness on one side of your body, sudden or severe headache, problems with vision, speech, or walking · Pain in your lower leg (calf) · Swelling in your ankles or feet · Unusual bleeding or bruising · Unusual tiredness or weakness If you notice these less serious side effects, talk with your doctor: · Breast pain · Diarrhea, constipation, stomach pain · Headache · Increased sweating · Mild joint, back, or muscle pain · Trouble sleeping · Vaginal bleeding · Warmth or redness in your face, neck, arms, or upper chest 
· Weight gain or loss If you notice other side effects that you think are caused by this medicine, tell your doctor. Call your doctor for medical advice about side effects. You may report side effects to FDA at 8-107-FDA-9642 © 2017 [address] Information is for End User's use only and may not be sold, redistributed or otherwise used for commercial purposes. The above information is an  only. It is not intended as medical advice for individual conditions or treatments. Talk to your doctor, nurse or pharmacist before following any medical regimen to see if it is safe and effective for you. Breast Cancer: Care Instructions Your Care Instructions Breast cancer occurs when abnormal cells grow out of control in the breast. These cancer cells can spread within the breast, to nearby lymph nodes and other tissues, and to other parts of the body. Being treated for cancer can weaken your body, and you may feel very tired. Get the rest your body needs so you can feel better. Finding out that you have cancer is scary. You may feel many emotions and may need some help coping. Seek out family, friends, and counselors for support.  You also can do things at home to make yourself feel better while you go through treatment. Call the [facility] ([phone]) or visit its website at [address] [employer] for more information. Follow-up care is a key part of your treatment and safety. Be sure to make and go to all appointments, and call your doctor if you are having problems. It's also a good idea to know your test results and keep a list of the medicines you take. How can you care for yourself at home? · Take your medicines exactly as prescribed. Call your doctor if you think you are having a problem with your medicine. You may get medicine for nausea and vomiting if you have these side effects. · Follow your doctor's instructions to relieve pain. Pain from cancer and surgery can almost always be controlled. Use pain medicine when you first notice pain, before it becomes severe. · Eat healthy food. If you do not feel like eating, try to eat food that has protein and extra calories to keep up your strength and prevent weight loss. Drink liquid meal replacements for extra calories and protein. Try to eat your main meal early. · Get some physical activity every day, but do not get too tired. Keep doing the hobbies you enjoy as your energy allows. · Do not smoke. Smoking can make your cancer worse. If you need help quitting, talk to your doctor about stop-smoking programs and medicines. These can increase your chances of quitting for good. · Take steps to control your stress and workload. Learn relaxation techniques. ? Share your feelings. Stress and tension affect our emotions. By expressing your feelings to others, you may be able to understand and cope with them. ? Consider joining a support group. Talking about a problem with your spouse, a good friend, or other people with similar problems is a good way to reduce tension and stress. ? Express yourself through art. Try writing, crafts, dance, or art to relieve stress.  Some dance, writing, or art groups may be available just for people who have cancer. ? Be kind to your body and mind. Getting enough sleep, eating a healthy diet, and taking time to do things you enjoy can contribute to an overall feeling of balance in your life and can help reduce stress. ? Get help if you need it. Discuss your concerns with your doctor or counselor. · If you are vomiting or have diarrhea: ? Drink plenty of fluids (enough so that your urine is light yellow or clear like water) to prevent dehydration. Choose water and other caffeine-free clear liquids. If you have kidney, heart, or liver disease and have to limit fluids, talk with your doctor before you increase the amount of fluids you drink. ? When you are able to eat, try clear soups, mild foods, and liquids until all symptoms are gone for 12 to 48 hours. Other good choices include dry toast, crackers, cooked cereal, and gelatin dessert, such as Jell-O. · If you have not already done so, prepare a list of advance directives. Advance directives are instructions to your doctor and family members about what kind of care you want if you become unable to speak or express yourself. When should you call for help? [id number] anytime you think you may need emergency care. For example, call if: 
· You passed out (lost consciousness). Call your doctor now or seek immediate medical care if: 
· You have a fever. · You have abnormal bleeding. · You think you have an infection. · You have new or worse pain. · You have new symptoms, such as a cough, belly pain, vomiting, diarrhea, or a rash. Watch closely for changes in your health, and be sure to contact your doctor if: 
· You are much more tired than usual. 
· You have swollen glands in your armpits, groin, or neck. · You do not get better as expected. Where can you learn more? Go to http://ashlie-funmilayo.info/ Enter V321 in the search box to learn more about \"Breast Cancer: Care Instructions. \" 
 Current as of: August 22, 2019               Content Version: 12.5 © 4163-8688 [employer]. Care instructions adapted under license by [employer] (which disclaims liability or warranty for this information). If you have questions about a medical condition or this instruction, always ask your healthcare professional. [address] any warranty or liability for your use of this information.

## 2020-09-30 DIAGNOSIS — Z79.811 LONG TERM (CURRENT) USE OF AROMATASE INHIBITORS: ICD-10-CM

## 2020-09-30 DIAGNOSIS — D50.8 IRON DEFICIENCY ANEMIA SECONDARY TO INADEQUATE DIETARY IRON INTAKE: Primary | ICD-10-CM

## 2020-09-30 DIAGNOSIS — Z17.0 MALIGNANT NEOPLASM OF UPPER-INNER QUADRANT OF LEFT BREAST IN FEMALE, ESTROGEN RECEPTOR POSITIVE (HCC): ICD-10-CM

## 2020-09-30 DIAGNOSIS — C50.212 MALIGNANT NEOPLASM OF UPPER-INNER QUADRANT OF LEFT FEMALE BREAST, UNSPECIFIED ESTROGEN RECEPTOR STATUS (HCC): ICD-10-CM

## 2020-09-30 DIAGNOSIS — C50.212 MALIGNANT NEOPLASM OF UPPER-INNER QUADRANT OF LEFT BREAST IN FEMALE, ESTROGEN RECEPTOR POSITIVE (HCC): ICD-10-CM

## 2020-09-30 RX ORDER — LETROZOLE 2.5 MG/1
TABLET, FILM COATED ORAL
Qty: 90 TAB | Refills: 0 | Status: SHIPPED | OUTPATIENT
Start: 2020-09-30 | End: 2020-12-30

## 2020-12-01 ENCOUNTER — TRANSCRIBE ORDER (OUTPATIENT)
Dept: SCHEDULING | Age: 65
End: 2020-12-01

## 2020-12-01 DIAGNOSIS — R42 DIZZINESS: Primary | ICD-10-CM

## 2020-12-02 ENCOUNTER — LAB ONLY (OUTPATIENT)
Dept: ONCOLOGY | Age: 65
End: 2020-12-02

## 2020-12-02 ENCOUNTER — HOSPITAL ENCOUNTER (OUTPATIENT)
Dept: LAB | Age: 65
Discharge: HOME | End: 2020-12-02
Payer: MEDICARE

## 2020-12-02 DIAGNOSIS — Z79.811 LONG TERM (CURRENT) USE OF AROMATASE INHIBITORS: ICD-10-CM

## 2020-12-02 DIAGNOSIS — Z17.0 MALIGNANT NEOPLASM OF UPPER-INNER QUADRANT OF LEFT BREAST IN FEMALE, ESTROGEN RECEPTOR POSITIVE (HCC): ICD-10-CM

## 2020-12-02 DIAGNOSIS — D50.8 IRON DEFICIENCY ANEMIA SECONDARY TO INADEQUATE DIETARY IRON INTAKE: ICD-10-CM

## 2020-12-02 DIAGNOSIS — C50.212 MALIGNANT NEOPLASM OF UPPER-INNER QUADRANT OF LEFT BREAST IN FEMALE, ESTROGEN RECEPTOR POSITIVE (HCC): ICD-10-CM

## 2020-12-02 DIAGNOSIS — D50.8 IRON DEFICIENCY ANEMIA SECONDARY TO INADEQUATE DIETARY IRON INTAKE: Primary | ICD-10-CM

## 2020-12-02 LAB
ALBUMIN SERPL-MCNC: 4.2 G/DL (ref 3.4–5)
ALBUMIN/GLOB SERPL: 1.1 {RATIO} (ref 0.8–1.7)
ALP SERPL-CCNC: 103 U/L (ref 45–117)
ALT SERPL-CCNC: 15 U/L (ref 13–56)
ANION GAP SERPL CALC-SCNC: 7 MMOL/L (ref 3–18)
AST SERPL-CCNC: 11 U/L (ref 10–38)
BASOPHILS # BLD: 0 K/UL (ref 0–0.1)
BASOPHILS NFR BLD: 0 % (ref 0–2)
BILIRUB SERPL-MCNC: 0.5 MG/DL (ref 0.2–1)
BUN SERPL-MCNC: 21 MG/DL (ref 7–18)
BUN/CREAT SERPL: 24 (ref 12–20)
CALCIUM SERPL-MCNC: 9.7 MG/DL (ref 8.5–10.1)
CHLORIDE SERPL-SCNC: 101 MMOL/L (ref 100–111)
CO2 SERPL-SCNC: 30 MMOL/L (ref 21–32)
CREAT SERPL-MCNC: 0.88 MG/DL (ref 0.6–1.3)
DIFFERENTIAL METHOD BLD: ABNORMAL
EOSINOPHIL # BLD: 0.1 K/UL (ref 0–0.4)
EOSINOPHIL NFR BLD: 1 % (ref 0–5)
ERYTHROCYTE [DISTWIDTH] IN BLOOD BY AUTOMATED COUNT: 14.2 % (ref 11.6–14.5)
FERRITIN SERPL-MCNC: 240 NG/ML (ref 8–388)
GLOBULIN SER CALC-MCNC: 3.8 G/DL (ref 2–4)
GLUCOSE SERPL-MCNC: 96 MG/DL (ref 74–99)
HCT VFR BLD AUTO: 34.8 % (ref 35–45)
HGB BLD-MCNC: 11.6 G/DL (ref 12–16)
IRON SATN MFR SERPL: 16 % (ref 20–50)
IRON SERPL-MCNC: 53 UG/DL (ref 50–175)
LYMPHOCYTES # BLD: 1.2 K/UL (ref 0.9–3.6)
LYMPHOCYTES NFR BLD: 21 % (ref 21–52)
MCH RBC QN AUTO: 30.1 PG (ref 24–34)
MCHC RBC AUTO-ENTMCNC: 33.3 G/DL (ref 31–37)
MCV RBC AUTO: 90.4 FL (ref 74–97)
MONOCYTES # BLD: 0.4 K/UL (ref 0.05–1.2)
MONOCYTES NFR BLD: 6 % (ref 3–10)
NEUTS SEG # BLD: 4.1 K/UL (ref 1.8–8)
NEUTS SEG NFR BLD: 72 % (ref 40–73)
PLATELET # BLD AUTO: 218 K/UL (ref 135–420)
PMV BLD AUTO: 12.3 FL (ref 9.2–11.8)
POTASSIUM SERPL-SCNC: 3.7 MMOL/L (ref 3.5–5.5)
PROT SERPL-MCNC: 8 G/DL (ref 6.4–8.2)
RBC # BLD AUTO: 3.85 M/UL (ref 4.2–5.3)
SODIUM SERPL-SCNC: 138 MMOL/L (ref 136–145)
TIBC SERPL-MCNC: 323 UG/DL (ref 250–450)
WBC # BLD AUTO: 5.7 K/UL (ref 4.6–13.2)

## 2020-12-02 PROCEDURE — 83540 ASSAY OF IRON: CPT

## 2020-12-02 PROCEDURE — 85025 COMPLETE CBC W/AUTO DIFF WBC: CPT

## 2020-12-02 PROCEDURE — 80053 COMPREHEN METABOLIC PANEL: CPT

## 2020-12-02 PROCEDURE — 86300 IMMUNOASSAY TUMOR CA 15-3: CPT

## 2020-12-02 PROCEDURE — 82728 ASSAY OF FERRITIN: CPT

## 2020-12-03 LAB — CANCER AG27-29 SERPL-ACNC: 9.7 U/ML (ref 0–38.6)

## 2020-12-12 NOTE — PROGRESS NOTES
Hematology/Oncology  Progress Note    Name: Daly Urrutia  : 1955    PCP: Shanae Fuentes MD     Ms. Luis June is a 76 y.o. female who was seen for management of her invasive ductal adenocarcinoma of the left breast.    Current therapy: Femara 2.5mg PO daily started May 2017    Subjective:     Mrs. Luis June is a 58-year-old woman who has a history of invasive ductal adenocarcinoma involving the left breast.  Patient was being followed by Dr. Chico Mccurdy who retired. Today she is doing well overall. She has noticed some weight loss for past couple months with intact appetite. She continues to take Femara 2.5mg PO daily and is tolerating the treatment reasonably well. She denied any significant distress since last visit. She was informed by her primary care physician she may have rheumatoid arthritis and would like to have referral to rheumatology. She denied any fever, chills, skin lumps or bumps, acute bleeding, bruising. She denied any nausea, vomiting, abdominal pain, diarrhea, focal weakness or numbness, new pain or bone pain. She has no other complaints at this point. The patient reports that she continues to do breast self exams on a monthly basis. Past medical history, family history, and social history: these were reviewed and remains unchanged.     Past Medical History:   Diagnosis Date    Anemia      Past Surgical History:   Procedure Laterality Date    BREAST SURGERY PROCEDURE UNLISTED      HX HYSTERECTOMY       Social History     Socioeconomic History    Marital status:      Spouse name: Not on file    Number of children: Not on file    Years of education: Not on file    Highest education level: Not on file   Occupational History    Not on file   Social Needs    Financial resource strain: Not on file    Food insecurity     Worry: Not on file     Inability: Not on file    Transportation needs     Medical: Not on file     Non-medical: Not on file   Tobacco Use    Smoking status: Never Smoker    Smokeless tobacco: Never Used   Substance and Sexual Activity    Alcohol use: No    Drug use: Not on file    Sexual activity: Not on file   Lifestyle    Physical activity     Days per week: Not on file     Minutes per session: Not on file    Stress: Not on file   Relationships    Social connections     Talks on phone: Not on file     Gets together: Not on file     Attends Yazdanism service: Not on file     Active member of club or organization: Not on file     Attends meetings of clubs or organizations: Not on file     Relationship status: Not on file    Intimate partner violence     Fear of current or ex partner: Not on file     Emotionally abused: Not on file     Physically abused: Not on file     Forced sexual activity: Not on file   Other Topics Concern    Not on file   Social History Narrative    Not on file     Family History   Problem Relation Age of Onset    Heart Disease Mother     Cancer Mother         colon    Diabetes Father     Cancer Brother         prostate    Alzheimer Paternal Uncle     Other Maternal Grandfather         old age   [facility] Cancer Brother         colon    Alzheimer Cousin      Current Outpatient Medications   Medication Sig Dispense Refill    letrozole ([address] [facility]) 2.5 mg tablet Take 1 tablet by mouth once daily 90 Tab 0    GaviLyte-G 236-22.74-6.74 -5.86 gram suspension AS PER PREP INSTRUCTIONS      losartan (COZAAR) 50 mg tablet TAKE 1 TABLET BY MOUTH NIGHTLY FOR HIGH BLOOD PRESSURE 30 Tab 0    ciclopirox (LOPROX) 0.77 % topical cream APPLY CREAM TO SOLES AND SIDES OF FEET TWICE DAILY  99    terbinafine HCl (LAMISIL) 250 mg tablet TAKE 1 TABLET BY MOUTH ONCE DAILY  0    cholecalciferol (VITAMIN D3) 1,000 unit tablet 1,000 Units.  Ferrous Fumarate 325 mg (106 mg iron) tab Take  by Mouth Once a Day.  docosahexanoic acid/epa (FISH OIL PO) Take  by Mouth Once a Day.       metFORMIN (GLUMETZA ER) 500 mg TG24 24 hour tablet Take 1 Tab by Mouth Once a Day.  multivitamin (ONE A DAY) tablet Take 1 Tab by Mouth Once a Day.  simvastatin (ZOCOR) 40 mg tablet 20 mg.      triamterene-hydroCHLOROthiazide (MAXZIDE) 75-50 mg per tablet          Review of Systems   Constitutional: Positive for weight loss. Negative for chills, diaphoresis, fever and malaise/fatigue. Respiratory: Negative for cough, hemoptysis, shortness of breath and wheezing. Cardiovascular: Negative for chest pain, palpitations and leg swelling. Gastrointestinal: Negative for abdominal pain, diarrhea, heartburn, nausea and vomiting. Genitourinary: Negative for dysuria, frequency, hematuria and urgency. Musculoskeletal: Negative for joint pain and myalgias. Skin: Negative for itching and rash. Neurological: Negative for dizziness, seizures, weakness and headaches. Psychiatric/Behavioral: Negative for depression. The patient does not have insomnia. Objective:     Visit Vitals  BP (!) 140/78   Pulse 62   Resp 16   Ht 5' 9\" (1.753 m)   Wt 80.3 kg (177 lb)   SpO2 100%   BMI 26.14 kg/m²       ECOG Performance Status (grade): 0  0 - able to carry on all pre-disease activity w/out restriction  1 - restricted but able to carry out light work  2 - ambulatory and can self- care but unable to carry out work  3 - bed or chair >50% of waking hours  4 - completely disable, total care, confined to bed or chair    Physical Exam  Constitutional:       Appearance: Normal appearance. HENT:      Head: Normocephalic and atraumatic. Eyes:      Pupils: Pupils are equal, round, and reactive to light. Neck:      Musculoskeletal: Neck supple. Cardiovascular:      Rate and Rhythm: Normal rate and regular rhythm. Heart sounds: Normal heart sounds. Pulmonary:      Effort: Pulmonary effort is normal.      Breath sounds: Normal breath sounds. Abdominal:      General: Bowel sounds are normal.      Palpations: Abdomen is soft. Tenderness:  There is no abdominal tenderness. There is no guarding. Musculoskeletal: Normal range of motion. Skin:     General: Skin is warm. Neurological:      General: No focal deficit present. Mental Status: She is alert and oriented to person, place, and time. Mental status is at baseline. Diagnostics:      No results found for this or any previous visit (from the past 96 hour(s)). Imaging:  No results found for this or any previous visit. Results for orders placed during the hospital encounter of 09/09/19   XR RIBS LT UNI 2 V    Narrative EXAM: XR RIBS LT UNI 2 V    INDICATION: 71 years Female. rib left. ADDITIONAL HISTORY: None reported trauma. Rib pain. TECHNIQUE: 3 views of the left ribs    COMPARISON: This radiograph 1/18/2011    FINDINGS:  There is no displaced left-sided rib fracture. There is generalized osteopenia. Moderate degenerative changes noted in the mid and lower thoracic spine. No  evidence of acute osseous abnormality. There is tortuosity the aorta. Atherosclerotic calcifications are noted in the  aorta. No confluent consolidation is noted in the visualized lungs. No evidence  of left-sided pleural effusion. Impression IMPRESSION:  1. No displaced left sided rib fracture. 2.  Tortuosity of the aorta. Aortic atherosclerosis. No results found for this or any previous visit. Assessment:     1. Malignant neoplasm of upper-inner quadrant of left breast in female, estrogen receptor positive ([address]    2. Long term (current) use of aromatase inhibitors    3. Malignant neoplasm of upper-inner quadrant of left female breast, unspecified estrogen receptor status ([address]    4. Iron deficiency anemia secondary to inadequate dietary iron intake      Plan:   # Weight loss  # Functional iron deficiency anemia:  -- She has noticed some weight loss for past couple months with intact appetite. -- The patient was advised to continue to take the Slow Fe 1 tablet daily.     -- Iron saturation was of 16% and ferritin was 240.  -- The patient reported she has follow-up with her GI and colonoscopy as scheduled. # Invasive ductal adenocarcinoma left breast  -- I have encouraged the patient to continue with her self breast exams. She will also continue with annual screening mammography. She states she is going to get mammogram next month. -- Femara 2.5mg PO daily will be continued. She would like to remain on the medication for full 10 years as previous discussed with Dr. Jeannie Simpson. -- Her recent mammogram 6/2020 was no mammographic evidence of malignancy. Recommend annual screening mammography. -- 12/2/2020 CBC reported hemoglobin was of 11.6, hematocrit was of 34.8%, platelet was 509,578. CA 27-29 was 9.7, WNL. # Long-term current use of aromatase inhibitors:   # Bone Health:   -- Advised on adequate amounts of calcium (at least 1,200 mg per day) and vitamin D(800-1,000 IU per day). The higher dose of vitamin D may be needed if vitamin D deficiency. -- Recommend regular weight-bearing and muscle-strengthening exercise to reduce the risk of falls and fractures. -- Advise avoidance of tobacco smoking and excessive alcohol intake. --The patient was advised to follow-up her primary care physician for DEXA scan and vitamin D checking. -- I will see the patient back in clinic in about 3 months. Always sooner if required. The patient can have lab done prior our next clinic visit. No orders of the defined types were placed in this encounter. Ms. Christopher Melton has a reminder for a \"due or due soon\" health maintenance. I have asked that she contact her primary care provider for follow-up on this health maintenance. All of patient's questions answered to their apparent satisfaction. They verbally show understanding and agreement with aforementioned plan.          Travis Harrison MD            About 25 minutes were spent for this encounter with more than 50% of the time spent in face-to-face counseling, discussing on diagnosis and management plan going forward, and co-ordination of care. Parts of this document has been produced using Dragon dictation system. Unrecognized errors in transcription may be present. Please do not hesitate to reach out for any questions or clarifications.         CC: Bria Bedolla MD

## 2020-12-16 ENCOUNTER — OFFICE VISIT (OUTPATIENT)
Dept: ONCOLOGY | Age: 65
End: 2020-12-16
Payer: MEDICARE

## 2020-12-16 VITALS
HEIGHT: 69 IN | OXYGEN SATURATION: 100 % | HEART RATE: 62 BPM | WEIGHT: 177 LBS | BODY MASS INDEX: 26.22 KG/M2 | DIASTOLIC BLOOD PRESSURE: 78 MMHG | SYSTOLIC BLOOD PRESSURE: 140 MMHG | RESPIRATION RATE: 16 BRPM

## 2020-12-16 DIAGNOSIS — C50.212 MALIGNANT NEOPLASM OF UPPER-INNER QUADRANT OF LEFT BREAST IN FEMALE, ESTROGEN RECEPTOR POSITIVE (HCC): Primary | ICD-10-CM

## 2020-12-16 DIAGNOSIS — D50.8 IRON DEFICIENCY ANEMIA SECONDARY TO INADEQUATE DIETARY IRON INTAKE: ICD-10-CM

## 2020-12-16 DIAGNOSIS — Z79.811 LONG TERM (CURRENT) USE OF AROMATASE INHIBITORS: ICD-10-CM

## 2020-12-16 DIAGNOSIS — C50.212 MALIGNANT NEOPLASM OF UPPER-INNER QUADRANT OF LEFT FEMALE BREAST, UNSPECIFIED ESTROGEN RECEPTOR STATUS (HCC): ICD-10-CM

## 2020-12-16 DIAGNOSIS — Z17.0 MALIGNANT NEOPLASM OF UPPER-INNER QUADRANT OF LEFT BREAST IN FEMALE, ESTROGEN RECEPTOR POSITIVE (HCC): Primary | ICD-10-CM

## 2020-12-16 PROCEDURE — G0463 HOSPITAL OUTPT CLINIC VISIT: HCPCS | Performed by: INTERNAL MEDICINE

## 2020-12-16 PROCEDURE — 99214 OFFICE O/P EST MOD 30 MIN: CPT | Performed by: INTERNAL MEDICINE

## 2020-12-16 RX ORDER — AMLODIPINE BESYLATE 2.5 MG/1
TABLET ORAL
COMMUNITY
Start: 2020-11-11

## 2020-12-16 RX ORDER — ASPIRIN 81 MG/1
81 TABLET ORAL DAILY
COMMUNITY

## 2020-12-16 RX ORDER — GLUCOSAMINE/CHONDR SU A SOD 750-600 MG
TABLET ORAL
COMMUNITY

## 2020-12-17 ENCOUNTER — HOSPITAL ENCOUNTER (OUTPATIENT)
Age: 65
Discharge: HOME | End: 2020-12-17
Attending: INTERNAL MEDICINE
Payer: MEDICARE

## 2020-12-17 DIAGNOSIS — R42 DIZZINESS: ICD-10-CM

## 2020-12-17 PROCEDURE — 70551 MRI BRAIN STEM W/O DYE: CPT

## 2020-12-29 DIAGNOSIS — Z79.811 LONG TERM (CURRENT) USE OF AROMATASE INHIBITORS: ICD-10-CM

## 2020-12-29 DIAGNOSIS — C50.212 MALIGNANT NEOPLASM OF UPPER-INNER QUADRANT OF LEFT BREAST IN FEMALE, ESTROGEN RECEPTOR POSITIVE (HCC): ICD-10-CM

## 2020-12-29 DIAGNOSIS — C50.212 MALIGNANT NEOPLASM OF UPPER-INNER QUADRANT OF LEFT FEMALE BREAST, UNSPECIFIED ESTROGEN RECEPTOR STATUS (HCC): ICD-10-CM

## 2020-12-29 DIAGNOSIS — D50.8 IRON DEFICIENCY ANEMIA SECONDARY TO INADEQUATE DIETARY IRON INTAKE: ICD-10-CM

## 2020-12-29 DIAGNOSIS — Z17.0 MALIGNANT NEOPLASM OF UPPER-INNER QUADRANT OF LEFT BREAST IN FEMALE, ESTROGEN RECEPTOR POSITIVE (HCC): ICD-10-CM

## 2020-12-30 RX ORDER — LETROZOLE 2.5 MG/1
TABLET, FILM COATED ORAL
Qty: 90 TAB | Refills: 0 | Status: SHIPPED | OUTPATIENT
Start: 2020-12-30 | End: 2021-04-01

## 2021-01-21 ENCOUNTER — HOSPITAL ENCOUNTER (OUTPATIENT)
Dept: LAB | Age: 66
Discharge: HOME | End: 2021-01-21
Payer: MEDICARE

## 2021-01-21 ENCOUNTER — LAB ONLY (OUTPATIENT)
Dept: ONCOLOGY | Age: 66
End: 2021-01-21

## 2021-01-21 DIAGNOSIS — C50.212 MALIGNANT NEOPLASM OF UPPER-INNER QUADRANT OF LEFT BREAST IN FEMALE, ESTROGEN RECEPTOR POSITIVE (HCC): ICD-10-CM

## 2021-01-21 DIAGNOSIS — Z79.811 LONG TERM (CURRENT) USE OF AROMATASE INHIBITORS: ICD-10-CM

## 2021-01-21 DIAGNOSIS — C50.212 MALIGNANT NEOPLASM OF UPPER-INNER QUADRANT OF LEFT FEMALE BREAST, UNSPECIFIED ESTROGEN RECEPTOR STATUS (HCC): ICD-10-CM

## 2021-01-21 DIAGNOSIS — Z17.0 MALIGNANT NEOPLASM OF UPPER-INNER QUADRANT OF LEFT BREAST IN FEMALE, ESTROGEN RECEPTOR POSITIVE (HCC): ICD-10-CM

## 2021-01-21 DIAGNOSIS — D50.8 IRON DEFICIENCY ANEMIA SECONDARY TO INADEQUATE DIETARY IRON INTAKE: ICD-10-CM

## 2021-01-21 DIAGNOSIS — D50.8 IRON DEFICIENCY ANEMIA SECONDARY TO INADEQUATE DIETARY IRON INTAKE: Primary | ICD-10-CM

## 2021-01-21 LAB
ALBUMIN SERPL-MCNC: 4.2 G/DL (ref 3.4–5)
ALBUMIN/GLOB SERPL: 1.1 {RATIO} (ref 0.8–1.7)
ALP SERPL-CCNC: 104 U/L (ref 45–117)
ALT SERPL-CCNC: 18 U/L (ref 13–56)
ANION GAP SERPL CALC-SCNC: 7 MMOL/L (ref 3–18)
AST SERPL-CCNC: 15 U/L (ref 10–38)
BASOPHILS # BLD: 0 K/UL (ref 0–0.1)
BASOPHILS NFR BLD: 0 % (ref 0–2)
BILIRUB SERPL-MCNC: 0.6 MG/DL (ref 0.2–1)
BUN SERPL-MCNC: 11 MG/DL (ref 7–18)
BUN/CREAT SERPL: 13 (ref 12–20)
CALCIUM SERPL-MCNC: 9.7 MG/DL (ref 8.5–10.1)
CHLORIDE SERPL-SCNC: 101 MMOL/L (ref 100–111)
CO2 SERPL-SCNC: 31 MMOL/L (ref 21–32)
CREAT SERPL-MCNC: 0.83 MG/DL (ref 0.6–1.3)
DIFFERENTIAL METHOD BLD: ABNORMAL
EOSINOPHIL # BLD: 0 K/UL (ref 0–0.4)
EOSINOPHIL NFR BLD: 1 % (ref 0–5)
ERYTHROCYTE [DISTWIDTH] IN BLOOD BY AUTOMATED COUNT: 14.1 % (ref 11.6–14.5)
FERRITIN SERPL-MCNC: 271 NG/ML (ref 8–388)
GLOBULIN SER CALC-MCNC: 3.7 G/DL (ref 2–4)
GLUCOSE SERPL-MCNC: 92 MG/DL (ref 74–99)
HCT VFR BLD AUTO: 34.6 % (ref 35–45)
HGB BLD-MCNC: 11.5 G/DL (ref 12–16)
IRON SATN MFR SERPL: 21 % (ref 20–50)
IRON SERPL-MCNC: 69 UG/DL (ref 50–175)
LYMPHOCYTES # BLD: 1.2 K/UL (ref 0.9–3.6)
LYMPHOCYTES NFR BLD: 24 % (ref 21–52)
MCH RBC QN AUTO: 30.3 PG (ref 24–34)
MCHC RBC AUTO-ENTMCNC: 33.2 G/DL (ref 31–37)
MCV RBC AUTO: 91.3 FL (ref 74–97)
MONOCYTES # BLD: 0.3 K/UL (ref 0.05–1.2)
MONOCYTES NFR BLD: 7 % (ref 3–10)
NEUTS SEG # BLD: 3.4 K/UL (ref 1.8–8)
NEUTS SEG NFR BLD: 68 % (ref 40–73)
PLATELET # BLD AUTO: 221 K/UL (ref 135–420)
PMV BLD AUTO: 12.4 FL (ref 9.2–11.8)
POTASSIUM SERPL-SCNC: 3.7 MMOL/L (ref 3.5–5.5)
PROT SERPL-MCNC: 7.9 G/DL (ref 6.4–8.2)
RBC # BLD AUTO: 3.79 M/UL (ref 4.2–5.3)
SODIUM SERPL-SCNC: 139 MMOL/L (ref 136–145)
TIBC SERPL-MCNC: 325 UG/DL (ref 250–450)
WBC # BLD AUTO: 5 K/UL (ref 4.6–13.2)

## 2021-01-21 PROCEDURE — 83540 ASSAY OF IRON: CPT

## 2021-01-21 PROCEDURE — 86300 IMMUNOASSAY TUMOR CA 15-3: CPT

## 2021-01-21 PROCEDURE — 82728 ASSAY OF FERRITIN: CPT

## 2021-01-21 PROCEDURE — 85025 COMPLETE CBC W/AUTO DIFF WBC: CPT

## 2021-01-21 PROCEDURE — 80053 COMPREHEN METABOLIC PANEL: CPT

## 2021-01-21 PROCEDURE — 36415 COLL VENOUS BLD VENIPUNCTURE: CPT

## 2021-01-22 LAB — CANCER AG27-29 SERPL-ACNC: 14.2 U/ML (ref 0–38.6)

## 2021-02-04 ENCOUNTER — HOSPITAL ENCOUNTER (OUTPATIENT)
Dept: LAB | Age: 66
Discharge: HOME | End: 2021-02-04
Payer: MEDICARE

## 2021-02-04 LAB
25(OH)D3 SERPL-MCNC: 24.7 NG/ML (ref 30–100)
FOLATE SERPL-MCNC: >20 NG/ML (ref 3.1–17.5)
T4 FREE SERPL-MCNC: 1 NG/DL (ref 0.7–1.5)
TSH SERPL DL<=0.05 MIU/L-ACNC: 0.82 UIU/ML (ref 0.36–3.74)
VIT B12 SERPL-MCNC: 968 PG/ML (ref 211–911)

## 2021-02-04 PROCEDURE — 36415 COLL VENOUS BLD VENIPUNCTURE: CPT

## 2021-02-04 PROCEDURE — 82607 VITAMIN B-12: CPT

## 2021-02-04 PROCEDURE — 84443 ASSAY THYROID STIM HORMONE: CPT

## 2021-02-04 PROCEDURE — 82306 VITAMIN D 25 HYDROXY: CPT

## 2021-03-03 ENCOUNTER — HOSPITAL ENCOUNTER (OUTPATIENT)
Dept: LAB | Age: 66
Discharge: HOME | End: 2021-03-03
Payer: MEDICARE

## 2021-03-03 ENCOUNTER — LAB ONLY (OUTPATIENT)
Dept: ONCOLOGY | Age: 66
End: 2021-03-03

## 2021-03-03 DIAGNOSIS — D50.8 IRON DEFICIENCY ANEMIA SECONDARY TO INADEQUATE DIETARY IRON INTAKE: ICD-10-CM

## 2021-03-03 DIAGNOSIS — Z17.0 MALIGNANT NEOPLASM OF UPPER-INNER QUADRANT OF LEFT BREAST IN FEMALE, ESTROGEN RECEPTOR POSITIVE (HCC): ICD-10-CM

## 2021-03-03 DIAGNOSIS — D50.8 IRON DEFICIENCY ANEMIA SECONDARY TO INADEQUATE DIETARY IRON INTAKE: Primary | ICD-10-CM

## 2021-03-03 DIAGNOSIS — C50.212 MALIGNANT NEOPLASM OF UPPER-INNER QUADRANT OF LEFT BREAST IN FEMALE, ESTROGEN RECEPTOR POSITIVE (HCC): ICD-10-CM

## 2021-03-03 DIAGNOSIS — C50.212 MALIGNANT NEOPLASM OF UPPER-INNER QUADRANT OF LEFT FEMALE BREAST, UNSPECIFIED ESTROGEN RECEPTOR STATUS (HCC): ICD-10-CM

## 2021-03-03 DIAGNOSIS — Z79.811 LONG TERM (CURRENT) USE OF AROMATASE INHIBITORS: ICD-10-CM

## 2021-03-03 LAB
ALBUMIN SERPL-MCNC: 4.2 G/DL (ref 3.4–5)
ALBUMIN/GLOB SERPL: 1.2 {RATIO} (ref 0.8–1.7)
ALP SERPL-CCNC: 108 U/L (ref 45–117)
ALT SERPL-CCNC: 18 U/L (ref 13–56)
ANION GAP SERPL CALC-SCNC: 9 MMOL/L (ref 3–18)
AST SERPL-CCNC: 13 U/L (ref 10–38)
BASOPHILS # BLD: 0 K/UL (ref 0–0.1)
BASOPHILS NFR BLD: 0 % (ref 0–2)
BILIRUB SERPL-MCNC: 0.6 MG/DL (ref 0.2–1)
BUN SERPL-MCNC: 14 MG/DL (ref 7–18)
BUN/CREAT SERPL: 17 (ref 12–20)
CALCIUM SERPL-MCNC: 9.9 MG/DL (ref 8.5–10.1)
CHLORIDE SERPL-SCNC: 103 MMOL/L (ref 100–111)
CO2 SERPL-SCNC: 28 MMOL/L (ref 21–32)
CREAT SERPL-MCNC: 0.81 MG/DL (ref 0.6–1.3)
DIFFERENTIAL METHOD BLD: ABNORMAL
EOSINOPHIL # BLD: 0.1 K/UL (ref 0–0.4)
EOSINOPHIL NFR BLD: 1 % (ref 0–5)
ERYTHROCYTE [DISTWIDTH] IN BLOOD BY AUTOMATED COUNT: 14 % (ref 11.6–14.5)
FERRITIN SERPL-MCNC: 248 NG/ML (ref 8–388)
GLOBULIN SER CALC-MCNC: 3.6 G/DL (ref 2–4)
GLUCOSE SERPL-MCNC: 91 MG/DL (ref 74–99)
HCT VFR BLD AUTO: 35.2 % (ref 35–45)
HGB BLD-MCNC: 11.6 G/DL (ref 12–16)
IRON SATN MFR SERPL: 17 % (ref 20–50)
IRON SERPL-MCNC: 54 UG/DL (ref 50–175)
LYMPHOCYTES # BLD: 1.2 K/UL (ref 0.9–3.6)
LYMPHOCYTES NFR BLD: 22 % (ref 21–52)
MCH RBC QN AUTO: 30.3 PG (ref 24–34)
MCHC RBC AUTO-ENTMCNC: 33 G/DL (ref 31–37)
MCV RBC AUTO: 91.9 FL (ref 74–97)
MONOCYTES # BLD: 0.5 K/UL (ref 0.05–1.2)
MONOCYTES NFR BLD: 10 % (ref 3–10)
NEUTS SEG # BLD: 3.5 K/UL (ref 1.8–8)
NEUTS SEG NFR BLD: 67 % (ref 40–73)
PLATELET # BLD AUTO: 220 K/UL (ref 135–420)
PMV BLD AUTO: 12.9 FL (ref 9.2–11.8)
POTASSIUM SERPL-SCNC: 4.1 MMOL/L (ref 3.5–5.5)
PROT SERPL-MCNC: 7.8 G/DL (ref 6.4–8.2)
RBC # BLD AUTO: 3.83 M/UL (ref 4.2–5.3)
SODIUM SERPL-SCNC: 140 MMOL/L (ref 136–145)
TIBC SERPL-MCNC: 312 UG/DL (ref 250–450)
WBC # BLD AUTO: 5.3 K/UL (ref 4.6–13.2)

## 2021-03-03 PROCEDURE — 82728 ASSAY OF FERRITIN: CPT

## 2021-03-03 PROCEDURE — 83540 ASSAY OF IRON: CPT

## 2021-03-03 PROCEDURE — 80053 COMPREHEN METABOLIC PANEL: CPT

## 2021-03-03 PROCEDURE — 85025 COMPLETE CBC W/AUTO DIFF WBC: CPT

## 2021-03-03 PROCEDURE — 36415 COLL VENOUS BLD VENIPUNCTURE: CPT

## 2021-03-03 PROCEDURE — 86300 IMMUNOASSAY TUMOR CA 15-3: CPT

## 2021-03-04 LAB — CANCER AG27-29 SERPL-ACNC: 14.2 U/ML (ref 0–38.6)

## 2021-03-17 ENCOUNTER — OFFICE VISIT (OUTPATIENT)
Dept: ONCOLOGY | Age: 66
End: 2021-03-17
Payer: MEDICARE

## 2021-03-17 VITALS
BODY MASS INDEX: 23.99 KG/M2 | SYSTOLIC BLOOD PRESSURE: 123 MMHG | HEART RATE: 63 BPM | TEMPERATURE: 97.6 F | DIASTOLIC BLOOD PRESSURE: 75 MMHG | OXYGEN SATURATION: 100 % | HEIGHT: 69 IN | WEIGHT: 162 LBS | RESPIRATION RATE: 18 BRPM

## 2021-03-17 DIAGNOSIS — C50.212 MALIGNANT NEOPLASM OF UPPER-INNER QUADRANT OF LEFT FEMALE BREAST, UNSPECIFIED ESTROGEN RECEPTOR STATUS (HCC): ICD-10-CM

## 2021-03-17 DIAGNOSIS — Z17.0 MALIGNANT NEOPLASM OF UPPER-INNER QUADRANT OF LEFT BREAST IN FEMALE, ESTROGEN RECEPTOR POSITIVE (HCC): ICD-10-CM

## 2021-03-17 DIAGNOSIS — D50.8 IRON DEFICIENCY ANEMIA SECONDARY TO INADEQUATE DIETARY IRON INTAKE: Primary | ICD-10-CM

## 2021-03-17 DIAGNOSIS — C50.212 MALIGNANT NEOPLASM OF UPPER-INNER QUADRANT OF LEFT BREAST IN FEMALE, ESTROGEN RECEPTOR POSITIVE (HCC): ICD-10-CM

## 2021-03-17 DIAGNOSIS — Z79.811 LONG TERM (CURRENT) USE OF AROMATASE INHIBITORS: ICD-10-CM

## 2021-03-17 PROCEDURE — G8432 DEP SCR NOT DOC, RNG: HCPCS | Performed by: NURSE PRACTITIONER

## 2021-03-17 PROCEDURE — 1090F PRES/ABSN URINE INCON ASSESS: CPT | Performed by: NURSE PRACTITIONER

## 2021-03-17 PROCEDURE — G8399 PT W/DXA RESULTS DOCUMENT: HCPCS | Performed by: NURSE PRACTITIONER

## 2021-03-17 PROCEDURE — 1101F PT FALLS ASSESS-DOCD LE1/YR: CPT | Performed by: NURSE PRACTITIONER

## 2021-03-17 PROCEDURE — G0463 HOSPITAL OUTPT CLINIC VISIT: HCPCS | Performed by: NURSE PRACTITIONER

## 2021-03-17 PROCEDURE — G8420 CALC BMI NORM PARAMETERS: HCPCS | Performed by: NURSE PRACTITIONER

## 2021-03-17 PROCEDURE — G8536 NO DOC ELDER MAL SCRN: HCPCS | Performed by: NURSE PRACTITIONER

## 2021-03-17 PROCEDURE — G8427 DOCREV CUR MEDS BY ELIG CLIN: HCPCS | Performed by: NURSE PRACTITIONER

## 2021-03-17 PROCEDURE — 3017F COLORECTAL CA SCREEN DOC REV: CPT | Performed by: NURSE PRACTITIONER

## 2021-03-17 PROCEDURE — 99213 OFFICE O/P EST LOW 20 MIN: CPT | Performed by: NURSE PRACTITIONER

## 2021-03-17 NOTE — PROGRESS NOTES
Hematology/Oncology  Progress Note    Name: Renetta Gaffney  : 1955    PCP: Mckenzie Portillo MD     Ms. Sharyle Drew is a 78 y.o. female who was seen for management of her invasive ductal adenocarcinoma of the left breast.    Current therapy: Femara 2.5mg PO daily started May 2017    Subjective:     Mrs. Sharyle Drew is a 58-year-old woman who has a history of invasive ductal adenocarcinoma involving the left breast. Today she is doing well overall. She has noticed some weight loss for past couple months with intact appetite. She report that she is seeing her PCP for this and CT scan has been ordered. She continues to take Femara 2.5mg PO daily and is tolerating the treatment reasonably well. She denied any significant distress since last visit. She was informed by her primary care physician she may have rheumatoid arthritis and would like to have referral to rheumatology. She denied any fever, chills, skin lumps or bumps, acute bleeding, bruising. She denied any nausea, vomiting, abdominal pain, diarrhea, focal weakness or numbness, new pain or bone pain. She has no other complaints at this point. The patient reports that she continues to do breast self exams on a monthly basis. Past medical history, family history, and social history: these were reviewed and remains unchanged.     Past Medical History:   Diagnosis Date    Anemia      Past Surgical History:   Procedure Laterality Date    HX HYSTERECTOMY      [place] BREAST SURGERY PROCEDURE UNLISTED       Social History     Socioeconomic History    Marital status:      Spouse name: Not on file    Number of children: Not on file    Years of education: Not on file    Highest education level: Not on file   Occupational History    Not on file   Social Needs    Financial resource strain: Not on file    Food insecurity     Worry: Not on file     Inability: Not on file    Transportation needs     Medical: Not on file     Non-medical: Not on file   Tobacco Use    Smoking status: Never Smoker    Smokeless tobacco: Never Used   Substance and Sexual Activity    Alcohol use: No    Drug use: Not Currently    Sexual activity: Not on file   Lifestyle    Physical activity     Days per week: Not on file     Minutes per session: Not on file    Stress: Not on file   Relationships    Social connections     Talks on phone: Not on file     Gets together: Not on file     Attends Christian service: Not on file     Active member of club or organization: Not on file     Attends meetings of clubs or organizations: Not on file     Relationship status: Not on file    Intimate partner violence     Fear of current or ex partner: Not on file     Emotionally abused: Not on file     Physically abused: Not on file     Forced sexual activity: Not on file   Other Topics Concern    Not on file   Social History Narrative    Not on file     Family History   Problem Relation Age of Onset    Heart Disease Mother     Cancer Mother         colon    Diabetes Father     Cancer Brother         prostate    Alzheimer Paternal Uncle     Other Maternal Grandfather         old age   Unk [place] Cancer Brother         colon    Alzheimer Cousin      Current Outpatient Medications   Medication Sig Dispense Refill    letrozole ([address] [facility]) 2.5 mg tablet Take 1 tablet by mouth once daily 90 Tab 0    amLODIPine (NORVASC) 2.5 mg tablet TAKE 1 TABLET BY MOUTH ONCE DAILY      Biotin 2,500 mcg cap Take  by mouth. Take 1,000 daily      aspirin delayed-release 81 mg tablet Take 81 mg by mouth daily.       GaviLyte-G 236-22.74-6.74 -5.86 gram suspension AS PER PREP INSTRUCTIONS      losartan (COZAAR) 50 mg tablet TAKE 1 TABLET BY MOUTH NIGHTLY FOR HIGH BLOOD PRESSURE 30 Tab 0    ciclopirox (LOPROX) 0.77 % topical cream APPLY CREAM TO SOLES AND SIDES OF FEET TWICE DAILY  99    terbinafine HCl (LAMISIL) 250 mg tablet TAKE 1 TABLET BY MOUTH ONCE DAILY  0    cholecalciferol (VITAMIN D3) 1,000 unit tablet 1,000 Units.      Ferrous Fumarate 325 mg (106 mg iron) tab Take  by Mouth Once a Day.  docosahexanoic acid/epa (FISH OIL PO) Take  by Mouth Once a Day.  metFORMIN (GLUMETZA ER) 500 mg TG24 24 hour tablet Take 1 Tab by Mouth Once a Day.  multivitamin (ONE A DAY) tablet Take 1 Tab by Mouth Once a Day.  simvastatin (ZOCOR) 40 mg tablet 20 mg.      triamterene-hydroCHLOROthiazide (MAXZIDE) 75-50 mg per tablet          Review of Systems   Constitutional: Positive for weight loss. Negative for chills, diaphoresis, fever and malaise/fatigue. Respiratory: Negative for cough, hemoptysis, shortness of breath and wheezing. Cardiovascular: Negative for chest pain, palpitations and leg swelling. Gastrointestinal: Negative for abdominal pain, diarrhea, heartburn, nausea and vomiting. Genitourinary: Negative for dysuria, frequency, hematuria and urgency. Musculoskeletal: Negative for joint pain and myalgias. Skin: Negative for itching and rash. Neurological: Negative for dizziness, seizures, weakness and headaches. Psychiatric/Behavioral: Negative for depression. The patient does not have insomnia. Objective:     Visit Vitals  /75 (BP 1 Location: Right arm, BP Patient Position: Sitting, BP Cuff Size: Adult)   Pulse 63   Temp 97.6 °F (36.4 °C) (Temporal)   Resp 18   Ht 5' 9\" (1.753 m)   Wt 73.5 kg (162 lb)   SpO2 100%   BMI 23.92 kg/m²       ECOG Performance Status (grade): 0  0 - able to carry on all pre-disease activity w/out restriction  1 - restricted but able to carry out light work  2 - ambulatory and can self- care but unable to carry out work  3 - bed or chair >50% of waking hours  4 - completely disable, total care, confined to bed or chair    Physical Exam  Constitutional:       Appearance: Normal appearance. HENT:      Head: Normocephalic and atraumatic. Eyes:      Pupils: Pupils are equal, round, and reactive to light. Neck:      Musculoskeletal: Neck supple. Cardiovascular:      Rate and Rhythm: Normal rate and regular rhythm. Heart sounds: Normal heart sounds. Pulmonary:      Effort: Pulmonary effort is normal.      Breath sounds: Normal breath sounds. Abdominal:      General: Bowel sounds are normal.      Palpations: Abdomen is soft. Tenderness: There is no abdominal tenderness. There is no guarding. Musculoskeletal: Normal range of motion. Skin:     General: Skin is warm. Neurological:      General: No focal deficit present. Mental Status: She is alert and oriented to person, place, and time. Mental status is at baseline. Diagnostics:      No results found for this or any previous visit (from the past 96 hour(s)). Imaging:  No results found for this or any previous visit. Results for orders placed during the hospital encounter of 09/09/19   XR RIBS LT UNI 2 V    Narrative EXAM: XR RIBS LT UNI 2 V    INDICATION: 71 years Female. rib left. ADDITIONAL HISTORY: None reported trauma. Rib pain. TECHNIQUE: 3 views of the left ribs    COMPARISON: This radiograph 1/18/2011    FINDINGS:  There is no displaced left-sided rib fracture. There is generalized osteopenia. Moderate degenerative changes noted in the mid and lower thoracic spine. No  evidence of acute osseous abnormality. There is tortuosity the aorta. Atherosclerotic calcifications are noted in the  aorta. No confluent consolidation is noted in the visualized lungs. No evidence  of left-sided pleural effusion. Impression IMPRESSION:  1. No displaced left sided rib fracture. 2.  Tortuosity of the aorta. Aortic atherosclerosis. No results found for this or any previous visit. Assessment:     1. Iron deficiency anemia secondary to inadequate dietary iron intake    2. Malignant neoplasm of upper-inner quadrant of left breast in female, estrogen receptor positive ([facility] [address]    3. Long term (current) use of aromatase inhibitors    4.  Malignant neoplasm of upper-inner quadrant of left female breast, unspecified estrogen receptor status ([address]      Plan:   # Weight loss  # Functional iron deficiency anemia:  -- She has noticed some weight loss for past couple months with intact appetite. -- Patient is being follow by her PCP for the weight loss and has ordered CT scan per patient    -- The patient was advised to continue to take the Slow Fe 1 tablet daily. -- Iron saturation was 17% and ferritin was 248.  -- The patient reported she has follow-up with her GI and up to date with colonoscopy     # Invasive ductal adenocarcinoma left breast  -- I have encouraged the patient to continue with her self breast exams. She will also continue with annual screening mammography. --- Mammogram done on 6/10/2020 showed no mammographic evidence of malignancy   -- Femara 2.5mg PO daily will be continued. She would like to remain on the medication for full 10 years   -- Her recent mammogram 6/2020 was no mammographic evidence of malignancy. Recommend annual screening mammography. -- CBC, CMP reviewed with patient, LINDSEY. # Long-term current use of aromatase inhibitors:   # Bone Health:   -- Advised on adequate amounts of calcium (at least 1,200 mg per day) and vitamin D(800-1,000 IU per day). The higher dose of vitamin D may be needed if vitamin D deficiency. -- Recommend regular weight-bearing and muscle-strengthening exercise to reduce the risk of falls and fractures. -- Advise avoidance of tobacco smoking and excessive alcohol intake. --pt is up to date with her  DEXA scan from her PCP on 7/09/2020       -- I will see the patient back in clinic in about 4 months. Always sooner if required. The patient can have lab done prior our next clinic visit. No orders of the defined types were placed in this encounter. Ms. Shant Schilling has a reminder for a \"due or due soon\" health maintenance.  I have asked that she contact her primary care provider for follow-up on this health maintenance. All of patient's questions answered to their apparent satisfaction. They verbally show understanding and agreement with aforementioned plan. Inés Garcia NP            About 25 minutes were spent for this encounter with more than 50% of the time spent in face-to-face counseling, discussing on diagnosis and management plan going forward, and co-ordination of care. Parts of this document has been produced using Dragon dictation system. Unrecognized errors in transcription may be present. Please do not hesitate to reach out for any questions or clarifications.         CC: Rosy Quigley MD

## 2021-03-17 NOTE — PATIENT INSTRUCTIONS
Iron Deficiency Anemia: Care Instructions Your Care Instructions Anemia means that you don't have enough red blood cells. Red blood cells carry oxygen around your body. When you have anemia, it can make you pale, weak, and tired. Many things can cause anemia. The most common cause is loss of blood. This can happen if you have heavy menstrual periods. It can also happen if you have bleeding in your stomach or bowel. You can also get anemia if you don't have enough iron in your diet or if it's hard for your body to absorb iron. In some cases, pregnancy causes anemia. That's because a pregnant woman needs more iron. Your doctor may do more tests to find the cause of your anemia. If a disease or other health problem is causing it, your doctor will treat that problem. It's important to follow up with your doctor to make sure that your iron level returns to normal. 
Follow-up care is a key part of your treatment and safety. Be sure to make and go to all appointments, and call your doctor if you are having problems. It's also a good idea to know your test results and keep a list of the medicines you take. How can you care for yourself at home? · If your doctor recommended iron pills, take them as directed. ? Try to take the pills on an empty stomach. You can do this about 1 hour before or 2 hours after meals. But you may need to take iron with food to avoid an upset stomach. ? Do not take antacids or drink milk or anything with caffeine within 2 hours of when you take your iron. They can keep your body from absorbing the iron well. ? Vitamin C helps your body absorb iron. You may want to take iron pills with a glass of orange juice or some other food high in vitamin C. 
? Iron pills may cause stomach problems. These include heartburn, nausea, diarrhea, constipation, and cramps. It can help to drink plenty of fluids and include fruits, vegetables, and fiber in your diet.  
? It's normal for iron pills to make your stool a greenish or grayish black. But internal bleeding can also cause dark stool. So it's important to tell your doctor about any color changes. ? Call your doctor if you think you are having a problem with your iron pills. Even after you start to feel better, it will take several months for your body to build up its supply of iron. ? If you miss a pill, don't take a double dose. ? Keep iron pills out of the reach of small children. Too much iron can be very dangerous. · Eat foods with a lot of iron. These include red meat, shellfish, poultry, and eggs. They also include beans, raisins, whole-grain bread, and leafy green vegetables. · Steam your vegetables. This is the best way to prepare them if you want to get as much iron as possible. · Be safe with medicines. Do not take nonsteroidal anti-inflammatory pain relievers unless your doctor tells you to. These include aspirin, naproxen (Aleve), and ibuprofen (Advil, Motrin). · Liquid iron can stain your teeth. But you can mix it with water or juice and drink it with a straw. Then it won't get on your teeth. When should you call for help? Call 911 anytime you think you may need emergency care. For example, call if: 
  · You passed out (lost consciousness). Call your doctor now or seek immediate medical care if: 
  · You are short of breath.  
  · You are dizzy or light-headed, or you feel like you may faint.  
  · You have new or worse bleeding. Watch closely for changes in your health, and be sure to contact your doctor if: 
  · You feel weaker or more tired than usual.  
  · You do not get better as expected. Where can you learn more? Go to http://www.gray.com/ Enter W507 in the search box to learn more about \"Iron Deficiency Anemia: Care Instructions. \" Current as of: November 8, 2019               Content Version: 12.6 © 7093-2070 [employer].   
Care instructions adapted under license by [facility] (which disclaims liability or warranty for this information). If you have questions about a medical condition or this instruction, always ask your healthcare professional. [address] any warranty or liability for your use of this information. Iron-Rich Diet: Care Instructions Your Care Instructions Your body needs iron to make hemoglobin. Hemoglobin is a substance in red blood cells that carries oxygen from the lungs to cells all through your body. If you do not get enough iron, your body makes fewer and smaller red blood cells. As a result, your body's cells may not get enough oxygen. Adult men need 8 milligrams of iron a day; adult women need 18 milligrams of iron a day. After menopause, women need 8 milligrams of iron a day. A pregnant woman needs 27 milligrams of iron a day. Infants and young children have higher iron needs relative to their size than other age groups. People who have lost blood because of ulcers or heavy menstrual periods may become very low in iron and may develop anemia. Most people can get the iron their bodies need by eating enough of certain iron-rich foods. Your doctor may recommend that you take an iron supplement along with eating an iron-rich diet. Follow-up care is a key part of your treatment and safety. Be sure to make and go to all appointments, and call your doctor if you are having problems. It's also a good idea to know your test results and keep a list of the medicines you take. How can you care for yourself at home? · Make iron-rich foods a part of your daily diet. Iron-rich foods include: ? All meats, such as chicken, beef, lamb, pork, fish, and shellfish. Liver is especially high in iron. ? Leafy green vegetables. ? Raisins, peas, beans, lentils, barley, and eggs. ? Iron-fortified breakfast cereals. · Eat foods with vitamin C along with iron-rich foods. Vitamin C helps you absorb more iron from food.  Drink a glass of orange juice or another citrus juice with your food. · Eat meat and vegetables or grains together. The iron in meat helps your body absorb the iron in other foods. Where can you learn more? Go to http://www.gray.com/ Enter [id number] in the search box to learn more about \"Iron-Rich Diet: Care Instructions. \" Current as of: August 22, 2019               Content Version: 12.6 © 7633-1230 [employer]. Care instructions adapted under license by [employer] (which disclaims liability or warranty for this information). If you have questions about a medical condition or this instruction, always ask your healthcare professional. [address] any warranty or liability for your use of this information. Learning About High-Iron Foods What foods are high in iron? The foods you eat contain nutrients, such as vitamins and minerals. Iron is a nutrient. Your body needs the right amount to stay healthy and work as it should. You can use the list below to help you make choices about which foods to eat. Here are some foods that contain iron. They have 1 to 2 milligrams of iron per serving. Fruits · Figs (dried), 5 figs Vegetables · Asparagus (canned), 6 sheridan · Jaqueline, beet, Swiss chard, or turnip greens, 1 cup · Dried peas, cooked, ½ cup · Seaweed, spirulina (dried), ¼ cup · Spinach, (cooked) ½ cup or (raw) 1 cup Grains · Cereals, fortified with iron, 1 cup · Grits (instant, cooked), fortified with iron, ½ cup Meats and other protein foods · Beans (kidney, lima, navy, white), canned or cooked, ½ cup · Beef or lamb, 3 oz · Chicken giblets, 3 oz · Chickpeas (garbanzo beans), ½ cup · Liver of beef, lamb, or pork, 3 oz · Oysters (cooked), 3 oz · Sardines (canned), 3 oz · Soybeans (boiled), ½ cup · Tofu (firm), ½ cup Work with your doctor to find out how much of this nutrient you need.  Depending on your health, you may need more or less of it in your diet. Current as of: August 22, 2019               Content Version: 12.6 © 8921-4439 [employer]. Care instructions adapted under license by [employer] (which disclaims liability or warranty for this information). If you have questions about a medical condition or this instruction, always ask your healthcare professional. [address] any warranty or liability for your use of this information. Breast Cancer: Care Instructions Your Care Instructions Breast cancer occurs when abnormal cells grow out of control in the breast. These cancer cells can spread within the breast, to nearby lymph nodes and other tissues, and to other parts of the body. Being treated for cancer can weaken your body, and you may feel very tired. Get the rest your body needs so you can feel better. Finding out that you have cancer is scary. You may feel many emotions and may need some help coping. Seek out family, friends, and counselors for support. You also can do things at home to make yourself feel better while you go through treatment. Call the [employer] ([phone]) or visit its website at [address] [employer] for more information. Follow-up care is a key part of your treatment and safety. Be sure to make and go to all appointments, and call your doctor if you are having problems. It's also a good idea to know your test results and keep a list of the medicines you take. How can you care for yourself at home? · Take your medicines exactly as prescribed. Call your doctor if you think you are having a problem with your medicine. You may get medicine for nausea and vomiting if you have these side effects. · Follow your doctor's instructions to relieve pain. Pain from cancer and surgery can almost always be controlled. Use pain medicine when you first notice pain, before it becomes severe. · Eat healthy food.  If you do not feel like eating, try to eat food that has protein and extra calories to keep up your strength and prevent weight loss. Drink liquid meal replacements for extra calories and protein. Try to eat your main meal early. · Get some physical activity every day, but do not get too tired. Keep doing the hobbies you enjoy as your energy allows. · Do not smoke. Smoking can make your cancer worse. If you need help quitting, talk to your doctor about stop-smoking programs and medicines. These can increase your chances of quitting for good. · Take steps to control your stress and workload. Learn relaxation techniques. ? Share your feelings. Stress and tension affect our emotions. By expressing your feelings to others, you may be able to understand and cope with them. ? Consider joining a support group. Talking about a problem with your spouse, a good friend, or other people with similar problems is a good way to reduce tension and stress. ? Express yourself through art. Try writing, crafts, dance, or art to relieve stress. Some dance, writing, or art groups may be available just for people who have cancer. ? Be kind to your body and mind. Getting enough sleep, eating a healthy diet, and taking time to do things you enjoy can contribute to an overall feeling of balance in your life and can help reduce stress. ? Get help if you need it. Discuss your concerns with your doctor or counselor. · If you are vomiting or have diarrhea: ? Drink plenty of fluids (enough so that your urine is light yellow or clear like water) to prevent dehydration. Choose water and other caffeine-free clear liquids. If you have kidney, heart, or liver disease and have to limit fluids, talk with your doctor before you increase the amount of fluids you drink. ? When you are able to eat, try clear soups, mild foods, and liquids until all symptoms are gone for 12 to 48 hours.  Other good choices include dry toast, crackers, cooked cereal, and gelatin dessert, such as Jell-O. · If you have not already done so, prepare a list of advance directives. Advance directives are instructions to your doctor and family members about what kind of care you want if you become unable to speak or express yourself. When should you call for help? Call 911 anytime you think you may need emergency care. For example, call if: 
  · You passed out (lost consciousness). Call your doctor now or seek immediate medical care if: 
  · You have a fever.  
  · You have abnormal bleeding.  
  · You think you have an infection.  
  · You have new or worse pain.  
  · You have new symptoms, such as a cough, belly pain, vomiting, diarrhea, or a rash. Watch closely for changes in your health, and be sure to contact your doctor if: 
  · You are much more tired than usual.  
  · You have swollen glands in your armpits, groin, or neck.  
  · You do not get better as expected. Where can you learn more? Go to http://www.montesinos.com/ Enter V321 in the search box to learn more about \"Breast Cancer: Care Instructions. \" Current as of: April 29, 2020               Content Version: 12.6 © 7482-4216 [employer]. Care instructions adapted under license by [employer] (which disclaims liability or warranty for this information). If you have questions about a medical condition or this instruction, always ask your healthcare professional. [address] any warranty or liability for your use of this information. Letrozole (By mouth) Letrozole (LET-julio cesar-zole) Treats breast cancer. Brand Name(s): [employer]-Pack There may be other brand names for this medicine. When This Medicine Should Not Be Used: This medicine is not right for everyone. Do not use it if you had an allergic reaction to letrozole, or if you are pregnant. How to Use This Medicine:  
Tablet · Your doctor will tell you how much medicine to use.  Do not use more than directed. · Missed dose: This medicine needs to be given on a fixed schedule. If you miss a dose, call your doctor for instructions. · Store the medicine in a closed container at room temperature, away from heat, moisture, and direct light. Drugs and Foods to Avoid: Ask your doctor or pharmacist before using any other medicine, including over-the-counter medicines, vitamins, and herbal products. · Some medicines can affect how letrozole works. Tell your doctor if you are also using tamoxifen. Warnings While Using This Medicine: · It is not safe to take this medicine during pregnancy. It could harm an unborn baby. Tell your doctor right away if you become pregnant. Use an effective form of birth control during treatment with this medicine and for at least 3 weeks after the last dose. · Do not breastfeed while you are taking this medicine and for at least 3 weeks after your last dose. · Tell your doctor if you have liver disease (including cirrhosis), bone problems (including osteoporosis), or high cholesterol in the blood. · This medicine may cause the following problems: 
¨ Low bone mineral density ¨ High cholesterol or fat levels in the blood ¨ Liver problems · This medicine may make you dizzy, drowsy, or tired. Do not drive or do anything else that could be dangerous until you know how this medicine affects you. · This medicine could cause infertility. Talk with your doctor before using this medicine if you plan to have children. · Medicines used to treat cancer are very strong and can have many side effects. Before receiving this medicine, make sure you understand all the risks and benefits. It is important for you to work closely with your doctor during your treatment. · Your doctor will do lab tests at regular visits to check on the effects of this medicine. Keep all appointments. · Keep all medicine out of the reach of children. Never share your medicine with anyone.  
Possible Side Effects While Using This Medicine:  
Call your doctor right away if you notice any of these side effects: · Allergic reaction: Itching or hives, swelling in your face or hands, swelling or tingling in your mouth or throat, chest tightness, trouble breathing · Bone pain · Chest pain, trouble breathing, coughing up blood · Dark urine, pale stools, nausea, vomiting, loss of appetite, stomach pain, yellow skin or eyes · Numbness or weakness on one side of your body, sudden or severe headache, problems with vision, speech, or walking · Pain in your lower leg (calf) · Swelling in your ankles or feet · Unusual bleeding or bruising · Unusual tiredness or weakness If you notice these less serious side effects, talk with your doctor: · Breast pain · Diarrhea, constipation, stomach pain · Headache · Increased sweating · Mild joint, back, or muscle pain · Trouble sleeping · Vaginal bleeding · Warmth or redness in your face, neck, arms, or upper chest 
· Weight gain or loss If you notice other side effects that you think are caused by this medicine, tell your doctor. Call your doctor for medical advice about side effects. You may report side effects to FDA at 8-027-FDA-8028 © 2017 [facility] Information is for End User's use only and may not be sold, redistributed or otherwise used for commercial purposes. The above information is an  only. It is not intended as medical advice for individual conditions or treatments. Talk to your doctor, nurse or pharmacist before following any medical regimen to see if it is safe and effective for you.

## 2021-03-31 DIAGNOSIS — C50.212 MALIGNANT NEOPLASM OF UPPER-INNER QUADRANT OF LEFT FEMALE BREAST, UNSPECIFIED ESTROGEN RECEPTOR STATUS (HCC): ICD-10-CM

## 2021-03-31 DIAGNOSIS — Z17.0 MALIGNANT NEOPLASM OF UPPER-INNER QUADRANT OF LEFT BREAST IN FEMALE, ESTROGEN RECEPTOR POSITIVE (HCC): ICD-10-CM

## 2021-03-31 DIAGNOSIS — D50.8 IRON DEFICIENCY ANEMIA SECONDARY TO INADEQUATE DIETARY IRON INTAKE: ICD-10-CM

## 2021-03-31 DIAGNOSIS — Z79.811 LONG TERM (CURRENT) USE OF AROMATASE INHIBITORS: ICD-10-CM

## 2021-03-31 DIAGNOSIS — C50.212 MALIGNANT NEOPLASM OF UPPER-INNER QUADRANT OF LEFT BREAST IN FEMALE, ESTROGEN RECEPTOR POSITIVE (HCC): ICD-10-CM

## 2021-04-01 RX ORDER — LETROZOLE 2.5 MG/1
TABLET, FILM COATED ORAL
Qty: 90 TAB | Refills: 1 | Status: SHIPPED | OUTPATIENT
Start: 2021-04-01 | End: 2021-09-27

## 2021-07-06 ENCOUNTER — TELEPHONE (OUTPATIENT)
Dept: CARDIOLOGY CLINIC | Age: 66
End: 2021-07-06

## 2021-07-06 NOTE — TELEPHONE ENCOUNTER
Called the patient to set up an overdue follow up with Dr. Alecia Oneill. Patient states she does not need an appointment right now and will call when she has issues.       DRU Kim, CET, CPT    MA [facility]

## 2021-07-07 ENCOUNTER — LAB ONLY (OUTPATIENT)
Dept: ONCOLOGY | Age: 66
End: 2021-07-07

## 2021-07-07 ENCOUNTER — HOSPITAL ENCOUNTER (OUTPATIENT)
Dept: LAB | Age: 66
Discharge: HOME | End: 2021-07-07
Payer: MEDICARE

## 2021-07-07 DIAGNOSIS — D50.8 IRON DEFICIENCY ANEMIA SECONDARY TO INADEQUATE DIETARY IRON INTAKE: ICD-10-CM

## 2021-07-07 DIAGNOSIS — R89.1 ABNORMAL LEVEL OF HORMONES IN SPECIMENS FROM OTH ORG/TISS: ICD-10-CM

## 2021-07-07 DIAGNOSIS — Z13.820 SPECIAL SCREENING FOR OSTEOPOROSIS: ICD-10-CM

## 2021-07-07 DIAGNOSIS — Z17.0 MALIGNANT NEOPLASM OF UPPER-INNER QUADRANT OF LEFT BREAST IN FEMALE, ESTROGEN RECEPTOR POSITIVE (HCC): Primary | ICD-10-CM

## 2021-07-07 DIAGNOSIS — C50.212 MALIGNANT NEOPLASM OF UPPER-INNER QUADRANT OF LEFT BREAST IN FEMALE, ESTROGEN RECEPTOR POSITIVE (HCC): Primary | ICD-10-CM

## 2021-07-07 LAB
ALBUMIN SERPL-MCNC: 4.1 G/DL (ref 3.4–5)
ALBUMIN/GLOB SERPL: 1.2 {RATIO} (ref 0.8–1.7)
ALP SERPL-CCNC: 92 U/L (ref 45–117)
ALT SERPL-CCNC: 20 U/L (ref 13–56)
ANION GAP SERPL CALC-SCNC: 3 MMOL/L (ref 3–18)
AST SERPL-CCNC: 16 U/L (ref 10–38)
BILIRUB SERPL-MCNC: 0.6 MG/DL (ref 0.2–1)
BUN SERPL-MCNC: 17 MG/DL (ref 7–18)
BUN/CREAT SERPL: 24 (ref 12–20)
CALCIUM SERPL-MCNC: 9.3 MG/DL (ref 8.5–10.1)
CHLORIDE SERPL-SCNC: 103 MMOL/L (ref 100–111)
CO2 SERPL-SCNC: 31 MMOL/L (ref 21–32)
CREAT SERPL-MCNC: 0.72 MG/DL (ref 0.6–1.3)
FERRITIN SERPL-MCNC: 355 NG/ML (ref 8–388)
GLOBULIN SER CALC-MCNC: 3.3 G/DL (ref 2–4)
GLUCOSE SERPL-MCNC: 102 MG/DL (ref 74–99)
IRON SATN MFR SERPL: 25 % (ref 20–50)
IRON SERPL-MCNC: 70 UG/DL (ref 50–175)
POTASSIUM SERPL-SCNC: 4 MMOL/L (ref 3.5–5.5)
PROT SERPL-MCNC: 7.4 G/DL (ref 6.4–8.2)
SODIUM SERPL-SCNC: 137 MMOL/L (ref 136–145)
TIBC SERPL-MCNC: 285 UG/DL (ref 250–450)

## 2021-07-07 PROCEDURE — 83540 ASSAY OF IRON: CPT

## 2021-07-07 PROCEDURE — 86300 IMMUNOASSAY TUMOR CA 15-3: CPT

## 2021-07-07 PROCEDURE — 80053 COMPREHEN METABOLIC PANEL: CPT

## 2021-07-07 PROCEDURE — 36415 COLL VENOUS BLD VENIPUNCTURE: CPT

## 2021-07-07 PROCEDURE — 82728 ASSAY OF FERRITIN: CPT

## 2021-07-08 LAB — CANCER AG27-29 SERPL-ACNC: 16 U/ML (ref 0–38.6)

## 2021-07-21 ENCOUNTER — OFFICE VISIT (OUTPATIENT)
Dept: ONCOLOGY | Age: 66
End: 2021-07-21
Payer: MEDICARE

## 2021-07-21 VITALS
HEIGHT: 69 IN | DIASTOLIC BLOOD PRESSURE: 71 MMHG | BODY MASS INDEX: 23.25 KG/M2 | RESPIRATION RATE: 16 BRPM | TEMPERATURE: 98.3 F | OXYGEN SATURATION: 97 % | HEART RATE: 63 BPM | WEIGHT: 157 LBS | SYSTOLIC BLOOD PRESSURE: 135 MMHG

## 2021-07-21 DIAGNOSIS — C50.212 MALIGNANT NEOPLASM OF UPPER-INNER QUADRANT OF LEFT BREAST IN FEMALE, ESTROGEN RECEPTOR POSITIVE (HCC): ICD-10-CM

## 2021-07-21 DIAGNOSIS — Z17.0 MALIGNANT NEOPLASM OF UPPER-INNER QUADRANT OF LEFT BREAST IN FEMALE, ESTROGEN RECEPTOR POSITIVE (HCC): ICD-10-CM

## 2021-07-21 DIAGNOSIS — R89.1 ABNORMAL LEVEL OF HORMONES IN SPECIMENS FROM OTH ORG/TISS: ICD-10-CM

## 2021-07-21 DIAGNOSIS — D50.8 IRON DEFICIENCY ANEMIA SECONDARY TO INADEQUATE DIETARY IRON INTAKE: Primary | ICD-10-CM

## 2021-07-21 DIAGNOSIS — Z13.820 SPECIAL SCREENING FOR OSTEOPOROSIS: ICD-10-CM

## 2021-07-21 PROCEDURE — G8427 DOCREV CUR MEDS BY ELIG CLIN: HCPCS | Performed by: NURSE PRACTITIONER

## 2021-07-21 PROCEDURE — G8536 NO DOC ELDER MAL SCRN: HCPCS | Performed by: NURSE PRACTITIONER

## 2021-07-21 PROCEDURE — 1090F PRES/ABSN URINE INCON ASSESS: CPT | Performed by: NURSE PRACTITIONER

## 2021-07-21 PROCEDURE — G8399 PT W/DXA RESULTS DOCUMENT: HCPCS | Performed by: NURSE PRACTITIONER

## 2021-07-21 PROCEDURE — G8432 DEP SCR NOT DOC, RNG: HCPCS | Performed by: NURSE PRACTITIONER

## 2021-07-21 PROCEDURE — 3017F COLORECTAL CA SCREEN DOC REV: CPT | Performed by: NURSE PRACTITIONER

## 2021-07-21 PROCEDURE — G8420 CALC BMI NORM PARAMETERS: HCPCS | Performed by: NURSE PRACTITIONER

## 2021-07-21 PROCEDURE — 99213 OFFICE O/P EST LOW 20 MIN: CPT | Performed by: NURSE PRACTITIONER

## 2021-07-21 PROCEDURE — 1101F PT FALLS ASSESS-DOCD LE1/YR: CPT | Performed by: NURSE PRACTITIONER

## 2021-07-21 PROCEDURE — G9899 SCRN MAM PERF RSLTS DOC: HCPCS | Performed by: NURSE PRACTITIONER

## 2021-07-21 PROCEDURE — G0463 HOSPITAL OUTPT CLINIC VISIT: HCPCS | Performed by: NURSE PRACTITIONER

## 2021-07-21 NOTE — PROGRESS NOTES
Hematology/Oncology  Progress Note    Name: Jamal Vega  : 1955    PCP: Israel Mckeon MD     Ms. Goldy La is a 78 y.o. female who was seen for management of her invasive ductal adenocarcinoma of the left breast.    Current therapy: Femara 2.5mg PO daily started May 2017    Subjective:     Mrs. Goldy La is a 59-year-old woman who has a history of invasive ductal adenocarcinoma involving the left breast. Today she is doing well overall. She continues to take Femara 2.5mg PO daily and is tolerating the treatment reasonably well. She denied any significant distress since last visit. She denied any fever, chills, skin lumps or bumps, acute bleeding, bruising. She denied any nausea, vomiting, abdominal pain, diarrhea, focal weakness or numbness, new pain or bone pain. She has no other complaints at this point. The patient reports that she continues to do breast self exams on a monthly basis. Past medical history, family history, and social history: these were reviewed and remains unchanged.     Past Medical History:   Diagnosis Date    Anemia      Past Surgical History:   Procedure Laterality Date    HX HYSTERECTOMY      VT BREAST SURGERY PROCEDURE UNLISTED       Social History     Socioeconomic History    Marital status:      Spouse name: Not on file    Number of children: Not on file    Years of education: Not on file    Highest education level: Not on file   Occupational History    Not on file   Tobacco Use    Smoking status: Never Smoker    Smokeless tobacco: Never Used   Substance and Sexual Activity    Alcohol use: No    Drug use: Not Currently    Sexual activity: Not on file   Other Topics Concern    Not on file   Social History Narrative    Not on file     Social Determinants of Health     Financial Resource Strain:     Difficulty of Paying Living Expenses:    Food Insecurity:     Worried About Running Out of Food in the Last Year:     [address] in the Last Year: Transportation Needs:     Lack of Transportation (Medical):  Lack of Transportation (Non-Medical):    Physical Activity:     Days of Exercise per Week:     Minutes of Exercise per Session:    Stress:     Feeling of Stress :    Social Connections:     Frequency of Communication with Friends and Family:     Frequency of Social Gatherings with Friends and Family:     Attends [employer] Services:     Active Member of Clubs or Organizations:     Attends Club or Organization Meetings:     Marital Status:    Intimate Partner Violence:     Fear of Current or Ex-Partner:     Emotionally Abused:     Physically Abused:     Sexually Abused:      Family History   Problem Relation Age of Onset    Heart Disease Mother     Cancer Mother         colon    Diabetes Father    Flakita Greenwood Brother         prostate    Alzheimer Paternal Uncle     Other Maternal Grandfather         old age   [place] Cancer Brother         colon    Alzheimer Cousin      Current Outpatient Medications   Medication Sig Dispense Refill    letrozole ([address] [facility]) 2.5 mg tablet Take 1 tablet by mouth once daily 90 Tab 1    amLODIPine (NORVASC) 2.5 mg tablet TAKE 1 TABLET BY MOUTH ONCE DAILY      Biotin 2,500 mcg cap Take  by mouth. Take 1,000 daily      aspirin delayed-release 81 mg tablet Take 81 mg by mouth daily.  GaviLyte-G 236-22.74-6.74 -5.86 gram suspension AS PER PREP INSTRUCTIONS      losartan (COZAAR) 50 mg tablet TAKE 1 TABLET BY MOUTH NIGHTLY FOR HIGH BLOOD PRESSURE 30 Tab 0    ciclopirox (LOPROX) 0.77 % topical cream APPLY CREAM TO SOLES AND SIDES OF FEET TWICE DAILY  99    terbinafine HCl (LAMISIL) 250 mg tablet TAKE 1 TABLET BY MOUTH ONCE DAILY  0    cholecalciferol (VITAMIN D3) 1,000 unit tablet 1,000 Units.  Ferrous Fumarate 325 mg (106 mg iron) tab Take  by Mouth Once a Day.  docosahexanoic acid/epa (FISH OIL PO) Take  by Mouth Once a Day.       metFORMIN (GLUMETZA ER) 500 mg TG24 24 hour tablet Take 1 Tab by Mouth Once a Day.  multivitamin (ONE A DAY) tablet Take 1 Tab by Mouth Once a Day.  simvastatin (ZOCOR) 40 mg tablet 20 mg.      triamterene-hydroCHLOROthiazide (MAXZIDE) 75-50 mg per tablet          Review of Systems   Constitutional: Positive for weight loss. Negative for chills, diaphoresis, fever and malaise/fatigue. Respiratory: Negative for cough, hemoptysis, shortness of breath and wheezing. Cardiovascular: Negative for chest pain, palpitations and leg swelling. Gastrointestinal: Negative for abdominal pain, diarrhea, heartburn, nausea and vomiting. Genitourinary: Negative for dysuria, frequency, hematuria and urgency. Musculoskeletal: Negative for joint pain and myalgias. Skin: Negative for itching and rash. Neurological: Negative for dizziness, seizures, weakness and headaches. Psychiatric/Behavioral: Negative for depression. The patient does not have insomnia. Objective:     Visit Vitals  /71   Pulse 63   Temp 98.3 °F (36.8 °C)   Resp 16   Ht 5' 9\" (1.753 m)   Wt 71.2 kg (157 lb)   SpO2 97%   BMI 23.18 kg/m²       ECOG Performance Status (grade): 0  0 - able to carry on all pre-disease activity w/out restriction  1 - restricted but able to carry out light work  2 - ambulatory and can self- care but unable to carry out work  3 - bed or chair >50% of waking hours  4 - completely disable, total care, confined to bed or chair    Physical Exam  Constitutional:       Appearance: Normal appearance. HENT:      Head: Normocephalic and atraumatic. Eyes:      Pupils: Pupils are equal, round, and reactive to light. Cardiovascular:      Rate and Rhythm: Normal rate and regular rhythm. Heart sounds: Normal heart sounds. Pulmonary:      Effort: Pulmonary effort is normal.      Breath sounds: Normal breath sounds. Abdominal:      General: Bowel sounds are normal.      Palpations: Abdomen is soft. Tenderness: There is no abdominal tenderness.  There is no guarding. Musculoskeletal:         General: Normal range of motion. Cervical back: Neck supple. Skin:     General: Skin is warm. Neurological:      General: No focal deficit present. Mental Status: She is alert and oriented to person, place, and time. Mental status is at baseline. Diagnostics:      No results found for this or any previous visit (from the past 96 hour(s)). Imaging:  No results found for this or any previous visit. Results for orders placed during the hospital encounter of 09/09/19    XR RIBS LT UNI 2 V    Narrative  EXAM: XR RIBS LT UNI 2 V    INDICATION: 71 years Female. rib left. ADDITIONAL HISTORY: None reported trauma. Rib pain. TECHNIQUE: 3 views of the left ribs    COMPARISON: This radiograph 1/18/2011    FINDINGS:  There is no displaced left-sided rib fracture. There is generalized osteopenia. Moderate degenerative changes noted in the mid and lower thoracic spine. No  evidence of acute osseous abnormality. There is tortuosity the aorta. Atherosclerotic calcifications are noted in the  aorta. No confluent consolidation is noted in the visualized lungs. No evidence  of left-sided pleural effusion. Impression  IMPRESSION:  1. No displaced left sided rib fracture. 2.  Tortuosity of the aorta. Aortic atherosclerosis. No results found for this or any previous visit. Assessment:     1. Iron deficiency anemia secondary to inadequate dietary iron intake    2. Malignant neoplasm of upper-inner quadrant of left breast in female, estrogen receptor positive ([address]    3. Special screening for osteoporosis    4. Abnormal level of hormones in specimens from oth org/tiss      Plan:   # Functional iron deficiency anemia:   -- The patient was advised to continue to take the Slow Fe 1 tablet daily.     -- Iron saturation was 25% and ferritin was 355.  -- The patient reported she has follow-up with her GI and up to date with colonoscopy     # Invasive ductal adenocarcinoma left breast  -- I have encouraged the patient to continue with her self breast exams. She will also continue with annual screening mammography. --- Mammogram done on 6/16/2021 showed no mammographic evidence of malignancy   -- Femara 2.5mg PO daily will be continued. She would like to remain on the medication for full 10 years   --  CBC, CMP reviewed with patient      # Long-term current use of aromatase inhibitors:   # Bone Health:   -- Advised on adequate amounts of calcium (at least 1,200 mg per day) and vitamin D(800-1,000 IU per day). The higher dose of vitamin D may be needed if vitamin D deficiency. -- Recommend regular weight-bearing and muscle-strengthening exercise to reduce the risk of falls and fractures. -- Advise avoidance of tobacco smoking and excessive alcohol intake. --pt is up to date with her  DEXA scan from her PCP on 7/09/2020       -- I will see the patient back in clinic in about 4 months. Always sooner if required. The patient can have lab done prior our next clinic visit. Orders Placed This Encounter    METABOLIC PANEL, COMPREHENSIVE     Standing Status:   Future     Standing Expiration Date:   1/21/2022    FERRITIN     Standing Status:   Future     Standing Expiration Date:   1/21/2022    IRON PROFILE     Standing Status:   Future     Standing Expiration Date:   1/21/2022    CA 27.29     Standing Status:   Future     Standing Expiration Date:   1/21/2022    CBC WITH AUTOMATED DIFF     Standing Status:   Future     Standing Expiration Date:   1/21/2022       Ms. Rylee Romo has a reminder for a \"due or due soon\" health maintenance. I have asked that she contact her primary care provider for follow-up on this health maintenance. All of patient's questions answered to their apparent satisfaction. They verbally show understanding and agreement with aforementioned plan.          Ana Weber NP            About 25 minutes were spent for this encounter with more than 50% of the time spent in face-to-face counseling, discussing on diagnosis and management plan going forward, and co-ordination of care. Parts of this document has been produced using Dragon dictation system. Unrecognized errors in transcription may be present. Please do not hesitate to reach out for any questions or clarifications.         CC: Kevyn Zamorano MD

## 2021-07-21 NOTE — PATIENT INSTRUCTIONS
Breast Cancer: Care Instructions  Your Care Instructions     Breast cancer occurs when abnormal cells grow out of control in the breast. These cancer cells can spread within the breast, to nearby lymph nodes and other tissues, and to other parts of the body. Being treated for cancer can weaken your body, and you may feel very tired. Get the rest your body needs so you can feel better. Finding out that you have cancer is scary. You may feel many emotions and may need some help coping. Seek out family, friends, and counselors for support. You also can do things at home to make yourself feel better while you go through treatment. Call the [facility] (1-747.796.8507) or visit its website at [employer] [facility] for more information. Follow-up care is a key part of your treatment and safety. Be sure to make and go to all appointments, and call your doctor if you are having problems. It's also a good idea to know your test results and keep a list of the medicines you take. How can you care for yourself at home? · Take your medicines exactly as prescribed. Call your doctor if you think you are having a problem with your medicine. You may get medicine for nausea and vomiting if you have these side effects. · Follow your doctor's instructions to relieve pain. Pain from cancer and surgery can almost always be controlled. Use pain medicine when you first notice pain, before it becomes severe. · Eat healthy food. If you do not feel like eating, try to eat food that has protein and extra calories to keep up your strength and prevent weight loss. Drink liquid meal replacements for extra calories and protein. Try to eat your main meal early. · Get some physical activity every day, but do not get too tired. Keep doing the hobbies you enjoy as your energy allows. · Do not smoke. Smoking can make your cancer worse. If you need help quitting, talk to your doctor about stop-smoking programs and medicines.  These can increase your chances of quitting for good. · Take steps to control your stress and workload. Learn relaxation techniques. ? Share your feelings. Stress and tension affect our emotions. By expressing your feelings to others, you may be able to understand and cope with them. ? Consider joining a support group. Talking about a problem with your spouse, a good friend, or other people with similar problems is a good way to reduce tension and stress. ? Express yourself through art. Try writing, crafts, dance, or art to relieve stress. Some dance, writing, or art groups may be available just for people who have cancer. ? Be kind to your body and mind. Getting enough sleep, eating a healthy diet, and taking time to do things you enjoy can contribute to an overall feeling of balance in your life and can help reduce stress. ? Get help if you need it. Discuss your concerns with your doctor or counselor. · If you are vomiting or have diarrhea:  ? Drink plenty of fluids to prevent dehydration. Choose water and other caffeine-free clear liquids. If you have kidney, heart, or liver disease and have to limit fluids, talk with your doctor before you increase the amount of fluids you drink. ? When you are able to eat, try clear soups, mild foods, and liquids until all symptoms are gone for 12 to 48 hours. Other good choices include dry toast, crackers, cooked cereal, and gelatin dessert, such as Jell-O.  · If you have not already done so, prepare a list of advance directives. Advance directives are instructions to your doctor and family members about what kind of care you want if you become unable to speak or express yourself. When should you call for help? Call 911 anytime you think you may need emergency care. For example, call if:    · You passed out (lost consciousness).    Call your doctor now or seek immediate medical care if:    · You have a fever.     · You have abnormal bleeding.     · You think you have an infection.     · You have new or worse pain.     · You have new symptoms, such as a cough, belly pain, vomiting, diarrhea, or a rash. Watch closely for changes in your health, and be sure to contact your doctor if:    · You are much more tired than usual.     · You have swollen glands in your armpits, groin, or neck.     · You do not get better as expected. Where can you learn more? Go to http://www.[employer].com/  Enter V321 in the search box to learn more about \"Breast Cancer: Care Instructions. \"  Current as of: December 17, 2020               Content Version: 12.8  © 7154-7477 [employer]. Care instructions adapted under license by [employer] (which disclaims liability or warranty for this information). If you have questions about a medical condition or this instruction, always ask your healthcare professional. [address] any warranty or liability for your use of this information. Letrozole (By mouth)   Letrozole (LET-julio cesar-zole)  Treats breast cancer. Brand Name(s): Yary Bales Femara Co-Pack   There may be other brand names for this medicine. When This Medicine Should Not Be Used: This medicine is not right for everyone. Do not use it if you had an allergic reaction to letrozole, or if you are pregnant. How to Use This Medicine:   Tablet  · Your doctor will tell you how much medicine to use. Do not use more than directed. · Missed dose: This medicine needs to be given on a fixed schedule. If you miss a dose, call your doctor for instructions. · Store the medicine in a closed container at room temperature, away from heat, moisture, and direct light. Drugs and Foods to Avoid:   Ask your doctor or pharmacist before using any other medicine, including over-the-counter medicines, vitamins, and herbal products. · Some medicines can affect how letrozole works. Tell your doctor if you are also using tamoxifen.   Warnings While Using This Medicine:   · It is not safe to take this medicine during pregnancy. It could harm an unborn baby. Tell your doctor right away if you become pregnant. Use an effective form of birth control during treatment with this medicine and for at least 3 weeks after the last dose. · Do not breastfeed while you are taking this medicine and for at least 3 weeks after your last dose. · Tell your doctor if you have liver disease (including cirrhosis), bone problems (including osteoporosis), or high cholesterol in the blood. · This medicine may cause the following problems:  ¨ Low bone mineral density  ¨ High cholesterol or fat levels in the blood  ¨ Liver problems  · This medicine may make you dizzy, drowsy, or tired. Do not drive or do anything else that could be dangerous until you know how this medicine affects you. · This medicine could cause infertility. Talk with your doctor before using this medicine if you plan to have children. · Medicines used to treat cancer are very strong and can have many side effects. Before receiving this medicine, make sure you understand all the risks and benefits. It is important for you to work closely with your doctor during your treatment. · Your doctor will do lab tests at regular visits to check on the effects of this medicine. Keep all appointments. · Keep all medicine out of the reach of children. Never share your medicine with anyone.   Possible Side Effects While Using This Medicine:   Call your doctor right away if you notice any of these side effects:  · Allergic reaction: Itching or hives, swelling in your face or hands, swelling or tingling in your mouth or throat, chest tightness, trouble breathing  · Bone pain  · Chest pain, trouble breathing, coughing up blood  · Dark urine, pale stools, nausea, vomiting, loss of appetite, stomach pain, yellow skin or eyes  · Numbness or weakness on one side of your body, sudden or severe headache, problems with vision, speech, or walking  · Pain in your lower leg (calf)  · Swelling in your ankles or feet  · Unusual bleeding or bruising  · Unusual tiredness or weakness  If you notice these less serious side effects, talk with your doctor:   · Breast pain  · Diarrhea, constipation, stomach pain  · Headache  · Increased sweating  · Mild joint, back, or muscle pain  · Trouble sleeping  · Vaginal bleeding  · Warmth or redness in your face, neck, arms, or upper chest  · Weight gain or loss  If you notice other side effects that you think are caused by this medicine, tell your doctor. Call your doctor for medical advice about side effects. You may report side effects to FDA at 4-120-FDA-2896  © 2017 [facility] Information is for End User's use only and may not be sold, redistributed or otherwise used for commercial purposes. The above information is an  only. It is not intended as medical advice for individual conditions or treatments. Talk to your doctor, nurse or pharmacist before following any medical regimen to see if it is safe and effective for you.

## 2021-09-11 ENCOUNTER — TRANSCRIBE ORDER (OUTPATIENT)
Dept: SCHEDULING | Age: 66
End: 2021-09-11

## 2021-09-11 DIAGNOSIS — R63.4 ABNORMAL WEIGHT LOSS: ICD-10-CM

## 2021-09-11 DIAGNOSIS — R42 DIZZINESS AND GIDDINESS: ICD-10-CM

## 2021-09-11 DIAGNOSIS — Z85.3 PERSONAL HISTORY OF MALIGNANT NEOPLASM OF BREAST: Primary | ICD-10-CM

## 2021-09-26 DIAGNOSIS — C50.212 MALIGNANT NEOPLASM OF UPPER-INNER QUADRANT OF LEFT BREAST IN FEMALE, ESTROGEN RECEPTOR POSITIVE (HCC): ICD-10-CM

## 2021-09-26 DIAGNOSIS — D50.8 IRON DEFICIENCY ANEMIA SECONDARY TO INADEQUATE DIETARY IRON INTAKE: ICD-10-CM

## 2021-09-26 DIAGNOSIS — Z79.811 LONG TERM (CURRENT) USE OF AROMATASE INHIBITORS: ICD-10-CM

## 2021-09-26 DIAGNOSIS — C50.212 MALIGNANT NEOPLASM OF UPPER-INNER QUADRANT OF LEFT FEMALE BREAST, UNSPECIFIED ESTROGEN RECEPTOR STATUS (HCC): ICD-10-CM

## 2021-09-26 DIAGNOSIS — Z17.0 MALIGNANT NEOPLASM OF UPPER-INNER QUADRANT OF LEFT BREAST IN FEMALE, ESTROGEN RECEPTOR POSITIVE (HCC): ICD-10-CM

## 2021-09-27 RX ORDER — LETROZOLE 2.5 MG/1
TABLET, FILM COATED ORAL
Qty: 90 TABLET | Refills: 0 | Status: SHIPPED | OUTPATIENT
Start: 2021-09-27 | End: 2021-12-27 | Stop reason: SDUPTHER

## 2021-11-08 ENCOUNTER — APPOINTMENT (OUTPATIENT)
Dept: ONCOLOGY | Age: 66
End: 2021-11-08

## 2021-11-08 ENCOUNTER — HOSPITAL ENCOUNTER (OUTPATIENT)
Dept: LAB | Age: 66
Discharge: HOME | End: 2021-11-08
Payer: MEDICARE

## 2021-11-08 DIAGNOSIS — R89.1 ABNORMAL LEVEL OF HORMONES IN SPECIMENS FROM OTH ORG/TISS: ICD-10-CM

## 2021-11-08 DIAGNOSIS — Z17.0 MALIGNANT NEOPLASM OF UPPER-INNER QUADRANT OF LEFT BREAST IN FEMALE, ESTROGEN RECEPTOR POSITIVE (HCC): ICD-10-CM

## 2021-11-08 DIAGNOSIS — Z13.820 SPECIAL SCREENING FOR OSTEOPOROSIS: ICD-10-CM

## 2021-11-08 DIAGNOSIS — C50.212 MALIGNANT NEOPLASM OF UPPER-INNER QUADRANT OF LEFT BREAST IN FEMALE, ESTROGEN RECEPTOR POSITIVE (HCC): ICD-10-CM

## 2021-11-08 DIAGNOSIS — D50.8 IRON DEFICIENCY ANEMIA SECONDARY TO INADEQUATE DIETARY IRON INTAKE: ICD-10-CM

## 2021-11-08 LAB
ALBUMIN SERPL-MCNC: 3.9 G/DL (ref 3.4–5)
ALBUMIN/GLOB SERPL: 1.1 {RATIO} (ref 0.8–1.7)
ALP SERPL-CCNC: 109 U/L (ref 45–117)
ALT SERPL-CCNC: 19 U/L (ref 13–56)
ANION GAP SERPL CALC-SCNC: 3 MMOL/L (ref 3–18)
AST SERPL-CCNC: 14 U/L (ref 10–38)
BASOPHILS # BLD: 0 K/UL (ref 0–0.1)
BASOPHILS NFR BLD: 1 % (ref 0–2)
BILIRUB SERPL-MCNC: 0.3 MG/DL (ref 0.2–1)
BUN SERPL-MCNC: 23 MG/DL (ref 7–18)
BUN/CREAT SERPL: 29 (ref 12–20)
CALCIUM SERPL-MCNC: 9.3 MG/DL (ref 8.5–10.1)
CHLORIDE SERPL-SCNC: 104 MMOL/L (ref 100–111)
CO2 SERPL-SCNC: 30 MMOL/L (ref 21–32)
CREAT SERPL-MCNC: 0.8 MG/DL (ref 0.6–1.3)
DIFFERENTIAL METHOD BLD: ABNORMAL
EOSINOPHIL # BLD: 0.1 K/UL (ref 0–0.4)
EOSINOPHIL NFR BLD: 2 % (ref 0–5)
ERYTHROCYTE [DISTWIDTH] IN BLOOD BY AUTOMATED COUNT: 14.2 % (ref 11.6–14.5)
FERRITIN SERPL-MCNC: 317 NG/ML (ref 8–388)
GLOBULIN SER CALC-MCNC: 3.6 G/DL (ref 2–4)
GLUCOSE SERPL-MCNC: 92 MG/DL (ref 74–99)
HCT VFR BLD AUTO: 34.6 % (ref 35–45)
HGB BLD-MCNC: 10.9 G/DL (ref 12–16)
IRON SATN MFR SERPL: 23 % (ref 20–50)
IRON SERPL-MCNC: 69 UG/DL (ref 50–175)
LYMPHOCYTES # BLD: 1.2 K/UL (ref 0.9–3.6)
LYMPHOCYTES NFR BLD: 23 % (ref 21–52)
MCH RBC QN AUTO: 29.9 PG (ref 24–34)
MCHC RBC AUTO-ENTMCNC: 31.5 G/DL (ref 31–37)
MCV RBC AUTO: 94.8 FL (ref 78–100)
MONOCYTES # BLD: 0.5 K/UL (ref 0.05–1.2)
MONOCYTES NFR BLD: 11 % (ref 3–10)
NEUTS SEG # BLD: 3.2 K/UL (ref 1.8–8)
NEUTS SEG NFR BLD: 63 % (ref 40–73)
PLATELET # BLD AUTO: 183 K/UL (ref 135–420)
PMV BLD AUTO: 12.6 FL (ref 9.2–11.8)
POTASSIUM SERPL-SCNC: 4.3 MMOL/L (ref 3.5–5.5)
PROT SERPL-MCNC: 7.5 G/DL (ref 6.4–8.2)
RBC # BLD AUTO: 3.65 M/UL (ref 4.2–5.3)
SODIUM SERPL-SCNC: 137 MMOL/L (ref 136–145)
TIBC SERPL-MCNC: 302 UG/DL (ref 250–450)
WBC # BLD AUTO: 5 K/UL (ref 4.6–13.2)

## 2021-11-08 PROCEDURE — 82728 ASSAY OF FERRITIN: CPT

## 2021-11-08 PROCEDURE — 86300 IMMUNOASSAY TUMOR CA 15-3: CPT

## 2021-11-08 PROCEDURE — 85025 COMPLETE CBC W/AUTO DIFF WBC: CPT

## 2021-11-08 PROCEDURE — 36415 COLL VENOUS BLD VENIPUNCTURE: CPT

## 2021-11-08 PROCEDURE — 83540 ASSAY OF IRON: CPT

## 2021-11-08 PROCEDURE — 80053 COMPREHEN METABOLIC PANEL: CPT

## 2021-11-10 LAB — CANCER AG27-29 SERPL-ACNC: 12 U/ML (ref 0–38.6)

## 2021-11-22 ENCOUNTER — OFFICE VISIT (OUTPATIENT)
Dept: ONCOLOGY | Age: 66
End: 2021-11-22
Payer: MEDICARE

## 2021-11-22 VITALS
DIASTOLIC BLOOD PRESSURE: 69 MMHG | WEIGHT: 157 LBS | HEIGHT: 69 IN | HEART RATE: 67 BPM | TEMPERATURE: 97.9 F | OXYGEN SATURATION: 100 % | BODY MASS INDEX: 23.25 KG/M2 | SYSTOLIC BLOOD PRESSURE: 129 MMHG

## 2021-11-22 DIAGNOSIS — C50.212 MALIGNANT NEOPLASM OF UPPER-INNER QUADRANT OF LEFT BREAST IN FEMALE, ESTROGEN RECEPTOR POSITIVE (HCC): ICD-10-CM

## 2021-11-22 DIAGNOSIS — C50.212 MALIGNANT NEOPLASM OF UPPER-INNER QUADRANT OF LEFT BREAST IN FEMALE, ESTROGEN RECEPTOR POSITIVE (HCC): Primary | ICD-10-CM

## 2021-11-22 DIAGNOSIS — Z17.0 MALIGNANT NEOPLASM OF UPPER-INNER QUADRANT OF LEFT BREAST IN FEMALE, ESTROGEN RECEPTOR POSITIVE (HCC): Primary | ICD-10-CM

## 2021-11-22 DIAGNOSIS — Z17.0 MALIGNANT NEOPLASM OF UPPER-INNER QUADRANT OF LEFT BREAST IN FEMALE, ESTROGEN RECEPTOR POSITIVE (HCC): ICD-10-CM

## 2021-11-22 DIAGNOSIS — D50.8 IRON DEFICIENCY ANEMIA SECONDARY TO INADEQUATE DIETARY IRON INTAKE: ICD-10-CM

## 2021-11-22 PROCEDURE — 1090F PRES/ABSN URINE INCON ASSESS: CPT | Performed by: NURSE PRACTITIONER

## 2021-11-22 PROCEDURE — G8536 NO DOC ELDER MAL SCRN: HCPCS | Performed by: NURSE PRACTITIONER

## 2021-11-22 PROCEDURE — 99214 OFFICE O/P EST MOD 30 MIN: CPT | Performed by: NURSE PRACTITIONER

## 2021-11-22 PROCEDURE — G0463 HOSPITAL OUTPT CLINIC VISIT: HCPCS | Performed by: INTERNAL MEDICINE

## 2021-11-22 PROCEDURE — G8399 PT W/DXA RESULTS DOCUMENT: HCPCS | Performed by: NURSE PRACTITIONER

## 2021-11-22 PROCEDURE — G8420 CALC BMI NORM PARAMETERS: HCPCS | Performed by: NURSE PRACTITIONER

## 2021-11-22 PROCEDURE — 1101F PT FALLS ASSESS-DOCD LE1/YR: CPT | Performed by: NURSE PRACTITIONER

## 2021-11-22 PROCEDURE — 3017F COLORECTAL CA SCREEN DOC REV: CPT | Performed by: NURSE PRACTITIONER

## 2021-11-22 PROCEDURE — G8432 DEP SCR NOT DOC, RNG: HCPCS | Performed by: NURSE PRACTITIONER

## 2021-11-22 PROCEDURE — G8427 DOCREV CUR MEDS BY ELIG CLIN: HCPCS | Performed by: NURSE PRACTITIONER

## 2021-11-22 PROCEDURE — G9899 SCRN MAM PERF RSLTS DOC: HCPCS | Performed by: NURSE PRACTITIONER

## 2021-11-22 NOTE — PROGRESS NOTES
Hematology/Oncology  Progress Note    Name: Olinda Mishra  : 1955   DATE: 21    PCP: Delfina Vinson MD     Ms. Mónica Campbell is a 78 y.o. female who was seen for management of her invasive ductal adenocarcinoma of the left breast.    Current therapy: Femara 2.5mg PO daily started May 2017    Subjective:   Ms. Olinda Mishra is a 78 y.o. female who was seen for management of her Invasive Ductal Adenocarcinoma of the Left Breast. She is currently taking Letrozole 2.5mg PO daily and she states she is tolerating this well without any side effects. She reports she started losing weight when she was put on Metformin about 7 years ago. She is worried about the weight loss due to her breast cancer however she states her HgbA1c dropped to 5.0. She admits she is afraid to eat as this might increase her blood sugar. Otherwise, she denies any fevers, chills, recurrent infections, and skin rash. She denies shortness of breath, dizziness, chest pains, fatigue, and palpitations. She denies any abdominal pains, nausea, vomiting, diarrhea, and constipation. She denies pain or any discomfort. She does not have any other concerns or complaints to report at this time. Past medical history, family history, and social history: these were reviewed and remains unchanged.     Past Medical History:   Diagnosis Date    Anemia      Past Surgical History:   Procedure Laterality Date    HX HYSTERECTOMY      UT BREAST SURGERY PROCEDURE UNLISTED       Social History     Socioeconomic History    Marital status:      Spouse name: Not on file    Number of children: Not on file    Years of education: Not on file    Highest education level: Not on file   Occupational History    Not on file   Tobacco Use    Smoking status: Never Smoker    Smokeless tobacco: Never Used   Substance and Sexual Activity    Alcohol use: No    Drug use: Not Currently    Sexual activity: Not on file   Other Topics Concern    Not on file   Social History Narrative    Not on file     Social Determinants of Health     Financial Resource Strain:     Difficulty of Paying Living Expenses: Not on file   Food Insecurity:     Worried About Running Out of Food in the Last Year: Not on file    Gen of Food in the Last Year: Not on file   Transportation Needs:     Lack of Transportation (Medical): Not on file    Lack of Transportation (Non-Medical): Not on file   Physical Activity:     Days of Exercise per Week: Not on file    Minutes of Exercise per Session: Not on file   Stress:     Feeling of Stress : Not on file   Social Connections:     Frequency of Communication with Friends and Family: Not on file    Frequency of Social Gatherings with Friends and Family: Not on file    Attends Restoration Services: Not on file    Active Member of [address] [place] [address] [employer] or Organizations: Not on file    Attends Club or Organization Meetings: Not on file    Marital Status: Not on file   Intimate Partner Violence:     Fear of Current or Ex-Partner: Not on file    Emotionally Abused: Not on file    Physically Abused: Not on file    Sexually Abused: Not on file   Housing Stability:     Unable to Pay for Housing in the Last Year: Not on file    Number of [place] in the Last Year: Not on file    Unstable Housing in the Last Year: Not on file     Family History   Problem Relation Age of Onset    Heart Disease Mother     Cancer Mother         colon    Diabetes Father     Cancer Brother         prostate    Alzheimer's Disease Paternal Uncle     Other Maternal Grandfather         old age   [facility] Cancer Brother         colon    Alzheimer's Disease Cousin      Current Outpatient Medications   Medication Sig Dispense Refill    letrozole ([address] [facility]) 2.5 mg tablet Take 1 tablet by mouth once daily 90 Tablet 0    amLODIPine (NORVASC) 2.5 mg tablet TAKE 1 TABLET BY MOUTH ONCE DAILY      Biotin 2,500 mcg cap Take  by mouth.  Take 1,000 daily      aspirin delayed-release 81 mg tablet Take 81 mg by mouth daily.  GaviLyte-G 236-22.74-6.74 -5.86 gram suspension AS PER PREP INSTRUCTIONS      losartan (COZAAR) 50 mg tablet TAKE 1 TABLET BY MOUTH NIGHTLY FOR HIGH BLOOD PRESSURE 30 Tab 0    ciclopirox (LOPROX) 0.77 % topical cream APPLY CREAM TO SOLES AND SIDES OF FEET TWICE DAILY  99    terbinafine HCl (LAMISIL) 250 mg tablet TAKE 1 TABLET BY MOUTH ONCE DAILY  0    cholecalciferol (VITAMIN D3) 1,000 unit tablet 1,000 Units.  Ferrous Fumarate 325 mg (106 mg iron) tab Take  by Mouth Once a Day.  docosahexanoic acid/epa (FISH OIL PO) Take  by Mouth Once a Day.  metFORMIN (GLUMETZA ER) 500 mg TG24 24 hour tablet Take 1 Tab by Mouth Once a Day.  multivitamin (ONE A DAY) tablet Take 1 Tab by Mouth Once a Day.  simvastatin (ZOCOR) 40 mg tablet 20 mg.      triamterene-hydroCHLOROthiazide (MAXZIDE) 75-50 mg per tablet          Review of Systems   Constitutional: Positive for weight loss. Negative for chills, diaphoresis, fever and malaise/fatigue. Respiratory: Negative for cough, hemoptysis, shortness of breath and wheezing. Cardiovascular: Negative for chest pain, palpitations and leg swelling. Gastrointestinal: Negative for abdominal pain, diarrhea, heartburn, nausea and vomiting. Genitourinary: Negative for dysuria, frequency, hematuria and urgency. Musculoskeletal: Negative for joint pain and myalgias. Skin: Negative for itching and rash. Neurological: Negative for dizziness, seizures, weakness and headaches. Psychiatric/Behavioral: Negative for depression. The patient does not have insomnia.          Objective:     Visit Vitals  /69 (BP 1 Location: Right upper arm, BP Patient Position: Sitting)   Pulse 67   Temp 97.9 °F (36.6 °C)   Ht 5' 9\" (1.753 m)   Wt 71.2 kg (157 lb)   SpO2 100%   BMI 23.18 kg/m²       ECOG Performance Status (grade): 0  0 - able to carry on all pre-disease activity w/out restriction  1 - restricted but able to carry out light work  2 - ambulatory and can self- care but unable to carry out work  3 - bed or chair >50% of waking hours  4 - completely disable, total care, confined to bed or chair    Physical Exam  Constitutional:       Appearance: Normal appearance. HENT:      Head: Normocephalic and atraumatic. Eyes:      Pupils: Pupils are equal, round, and reactive to light. Cardiovascular:      Rate and Rhythm: Normal rate and regular rhythm. Heart sounds: Normal heart sounds. Pulmonary:      Effort: Pulmonary effort is normal.      Breath sounds: Normal breath sounds. Abdominal:      General: Bowel sounds are normal.      Palpations: Abdomen is soft. Tenderness: There is no abdominal tenderness. There is no guarding. Musculoskeletal:         General: Normal range of motion. Cervical back: Neck supple. Skin:     General: Skin is warm. Neurological:      General: No focal deficit present. Mental Status: She is alert and oriented to person, place, and time. Mental status is at baseline. DEXA Results (most recent):  Results from Hospital Encounter encounter on 07/09/20    DEXA BONE DENSITY STUDY AXIAL    Narrative  DEXA BONE DENSITOMETRY, CENTRAL    CLINICAL INDICATION/HISTORY: Post menopausal. 67-year-old female for baseline  study. History of breast cancer. Taking Femara and vitamin D.    TECHNIQUE: Using GE LUNAR Prodigy densitometer, bone density measurement was  performed in the lumbar spine, the proximal left and right femora. T Score  refers to standard deviations above or below average compared to a young adult  of the same sex. Z Score refers to standard deviations above or below average  compared to a patient of the same sex, age, race and weight. COMPARISON: None available.     FINDINGS:    Lumbar Spine Levels: L1-L3  Mean Bone Mineral Density (BMD):  1.065 g/cm2  T Score: -1.0  Z Score: -0.5    On PA image of the lumbar spine obtained for localization of vertebral levels  (not a diagnostic quality radiograph), there is apparent increased density at  L4. The density is not well characterized on the basis of this image, but  likely degenerative. Since this density spuriously increases measured bone  mineral density, this has been excluded. Left Total Proximal Femur BMD: 0.870 g/cm2  T Score: -1.1  Z Score: -1.1    Right Total Proximal Femur BMD: 0.888 g/cm2  T Score: -0.9  Z Score: -1.0    Left Femoral Neck BMD: 0.925  g/cm2  T Score: -0.8  Z Score: -0.5    Right Femoral Neck BMD: 0.925  g/cm2  T Score: -0.8  Z Score: -0.5    Impression  IMPRESSION:    BMD measures consistent with osteopenia/low bone density. Based upon current ISCD guidelines, the patient's overall diagnostic category,  selected using WHO criteria in postmenopausal women and males aged 48 and above,  is selected based upon the lowest T Score from among the lumbar spine, total  femur, femoral neck, (or distal third radius if measured). WHO Definition of Osteoporosis and Osteopenia on DXA (specified for post  menopausal  females):    Normal:                     T Score at or above -1 SD  Osteopenia:              T Score between -1 and -2.5 SD  Osteoporosis:          T Score at or below -2.5 SD    The risk of fracture approximately doubles for each 1 SD decrease in T Score. It is important to consider other factors in assessing a patient's risk of  fracture, including age, risk of falling/injury, history of fragility fracture,  family history of osteoporosis, smoking, low weight. Various fracture risk tools have been developed for adult patients and are  available online. For example, the FRAX tool developed by [facility] is widely used. Reference www.iscd.org.     It is also important to note that DXA measures bone density but does not  distinguish among causes of decreased bone density, which include primary versus  secondary osteoporosis (such as metabolic bone disorders or possible effects of  medications) and also other conditions (such as osteomalacia). Clinical  considerations should determine what additional evaluation may be warranted to  exclude secondary conditions in a patient with low bone density. Please note that reliable, valid comparisons can not be made between studies  which have been performed on different densitometers. If clinically warranted,  follow up study performed at this site would best permit assessment of trend for  possible change in bone mineral density over time in comparison to this study. Thank you for this referral.    [facility] Results (most recent):  Results from [place] encounter on 03/25/16    [facility] [address]    Narrative  PROCEDURE:  Digital Mammograms - Bilateral Screening. CPT:  , [id number]    INDICATION:  Routine screening. COMPARISON:  1/12/15, 1/9/14, 7/8/13    FINDINGS:    - Technique:  Full-field direct digital CC and MLO views. - CAD:  Images are interpreted in conjunction with iCAD (ClickMedix version 7.2  high-setting). Any areas annotated on the CAD are correlated with visualized  mammographic findings using digital magnification of the images and correlation  with comparison studies.  - Breast parenchymal pattern:  Fatty replaced. No dominant mass, suspicious new microcalcification cluster or architectural  distortion is detected. No potential findings are detected on iCAD. No  significant interval changes are observed. Impression  :    Benign mammograms. Recommend screening mammograms in one year. BIRADS category:  2. Benign. Note: 10% to 15% of breast cancers may not be identified by mammography. Dense  breast parenchyma can obscure an underlying neoplasm and some types of breast  cancers are not well shown on the mammogram.  A negative mammogram report should  not delay further evaluation if clinically suspicious findings, i.e. palpable  firm nodules are present.       LABS  Lab Results   Component Value Date/Time    WBC 5.0 11/08/2021 02:37 PM    HGB 10.9 (L) 11/08/2021 02:37 PM    HCT 34.6 (L) 11/08/2021 02:37 PM    PLATELET 073 35/89/8960 02:37 PM    MCV 94.8 11/08/2021 02:37 PM     Lab Results   Component Value Date/Time    Sodium 137 11/08/2021 02:37 PM    Potassium 4.3 11/08/2021 02:37 PM    Chloride 104 11/08/2021 02:37 PM    CO2 30 11/08/2021 02:37 PM    Anion gap 3 11/08/2021 02:37 PM    Glucose 92 11/08/2021 02:37 PM    BUN 23 (H) 11/08/2021 02:37 PM    Creatinine 0.80 11/08/2021 02:37 PM    BUN/Creatinine ratio 29 (H) 11/08/2021 02:37 PM    GFR est AA >60 11/08/2021 02:37 PM    GFR est non-AA >60 11/08/2021 02:37 PM    Calcium 9.3 11/08/2021 02:37 PM    Bilirubin, total 0.3 11/08/2021 02:37 PM    Alk. phosphatase 109 11/08/2021 02:37 PM    Protein, total 7.5 11/08/2021 02:37 PM    Albumin 3.9 11/08/2021 02:37 PM    Globulin 3.6 11/08/2021 02:37 PM    A-G Ratio 1.1 11/08/2021 02:37 PM    ALT (SGPT) 19 11/08/2021 02:37 PM    AST (SGOT) 14 11/08/2021 02:37 PM     Lab Results   Component Value Date/Time    Iron 69 11/08/2021 02:37 PM    TIBC 302 11/08/2021 02:37 PM    Iron % saturation 23 11/08/2021 02:37 PM    Ferritin 317 11/08/2021 02:37 PM         Assessment:   Invasive ductal adenocarcinoma left breast  Long-term current use of aromatase inhibitors:   Bone Health  Functional iron deficiency anemia  Weight Loss    Plan:   Invasive ductal adenocarcinoma left breast  --I have encouraged the patient to continue with her self breast exams. She will also continue with annual screening mammography. --Mammogram done on 6/16/2021 showed no mammographic evidence of malignancy   --Femara 2.5mg PO daily will be continued. She would like to remain on the medication for full 10 years   -- CBC, CMP reviewed with patient      Long-term current use of aromatase inhibitors  Bone Health  --Advised on adequate amounts of calcium (at least 1,200 mg per day) and vitamin D(800-1,000 IU per day).  The higher dose of vitamin D may be needed if vitamin D deficiency. --Recommended regular weight-bearing and muscle-strengthening exercise to reduce the risk of falls and fractures. --Advised avoidance of tobacco smoking and excessive alcohol intake. --DEXA scan from her PCP on 7/09/2020. Follow up in July 2022    Functional iron deficiency anemia:   --The patient was advised to continue to take the Slow Fe 1 tab every other day  --Iron saturation was 23% and ferritin was 317. --Continue to follow up with GI as scheduled    Weight loss  --Patient states she has been losing weight since she was started on Metformin 7 years ago but due to her history of breast cancer she is very worried. --Will obtain CT Chest, Abdomen, and Pelvis and NM Bone Scan  --Patient agreed with the plan. Follow up in 4 weeks for results or sooner if indicated    Orders Placed This Encounter    CT CHEST ABD PELV W CONT     Standing Status:   Future     Standing Expiration Date:   12/22/2022     Order Specific Question:   STAT Creatinine as indicated     Answer:   Yes     Order Specific Question: This order utilizes IV contrast.  What additional contrast is needed? Answer:   Per Radiologist Protocol    NM BONE SCAN [address] BODY     Standing Status:   Future     Standing Expiration Date:   12/22/2022    CBC WITH AUTOMATED DIFF     Standing Status:   Future     Standing Expiration Date:   11/23/2022    FERRITIN     Standing Status:   Future     Standing Expiration Date:   11/23/2022    IRON PROFILE     Standing Status:   Future     Standing Expiration Date:   28/48/8146    METABOLIC PANEL, COMPREHENSIVE     Standing Status:   Future     Standing Expiration Date:   11/23/2022    CA 27.29     Standing Status:   Future     Standing Expiration Date:   11/23/2022         Monalisa Nair DNP, [facility]  11/22/21        CC:  Harriett Rubio NP

## 2021-11-29 ENCOUNTER — TELEPHONE (OUTPATIENT)
Dept: ONCOLOGY | Age: 66
End: 2021-11-29

## 2021-12-10 ENCOUNTER — HOSPITAL ENCOUNTER (OUTPATIENT)
Dept: NUCLEAR MEDICINE | Age: 66
Discharge: HOME | End: 2021-12-10
Attending: NURSE PRACTITIONER
Payer: MEDICARE

## 2021-12-10 ENCOUNTER — HOSPITAL ENCOUNTER (OUTPATIENT)
Dept: CT IMAGING | Age: 66
Discharge: HOME | End: 2021-12-10
Attending: NURSE PRACTITIONER
Payer: MEDICARE

## 2021-12-10 PROCEDURE — 74011000636 HC RX REV CODE- 636: Performed by: NURSE PRACTITIONER

## 2021-12-10 PROCEDURE — 74177 CT ABD & PELVIS W/CONTRAST: CPT

## 2021-12-10 PROCEDURE — 82565 ASSAY OF CREATININE: CPT

## 2021-12-10 RX ADMIN — IOPAMIDOL 96 ML: 612 INJECTION, SOLUTION INTRAVENOUS at 08:47

## 2021-12-13 LAB — CREAT UR-MCNC: 0.8 MG/DL (ref 0.6–1.3)

## 2021-12-27 ENCOUNTER — PATIENT MESSAGE (OUTPATIENT)
Dept: ONCOLOGY | Age: 66
End: 2021-12-27

## 2021-12-27 DIAGNOSIS — Z17.0 MALIGNANT NEOPLASM OF UPPER-INNER QUADRANT OF LEFT BREAST IN FEMALE, ESTROGEN RECEPTOR POSITIVE (HCC): ICD-10-CM

## 2021-12-27 DIAGNOSIS — C50.212 MALIGNANT NEOPLASM OF UPPER-INNER QUADRANT OF LEFT BREAST IN FEMALE, ESTROGEN RECEPTOR POSITIVE (HCC): ICD-10-CM

## 2021-12-27 DIAGNOSIS — D50.8 IRON DEFICIENCY ANEMIA SECONDARY TO INADEQUATE DIETARY IRON INTAKE: ICD-10-CM

## 2021-12-27 DIAGNOSIS — Z79.811 LONG TERM (CURRENT) USE OF AROMATASE INHIBITORS: ICD-10-CM

## 2021-12-27 DIAGNOSIS — C50.212 MALIGNANT NEOPLASM OF UPPER-INNER QUADRANT OF LEFT FEMALE BREAST, UNSPECIFIED ESTROGEN RECEPTOR STATUS (HCC): ICD-10-CM

## 2021-12-27 RX ORDER — LETROZOLE 2.5 MG/1
2.5 TABLET, FILM COATED ORAL DAILY
Qty: 90 TABLET | Refills: 0 | Status: SHIPPED | OUTPATIENT
Start: 2021-12-27 | End: 2022-03-24 | Stop reason: SDUPTHER

## 2021-12-27 NOTE — TELEPHONE ENCOUNTER
From: Jakob Gu  To: Paige Lane NP  Sent: 12/27/2021 8:41 AM EST  Subject: Prescription    I only have 4 Letrozole 2.5MG pills left. I need prescription desperately. Thank you very much. ..

## 2022-01-05 ENCOUNTER — VIRTUAL VISIT (OUTPATIENT)
Dept: ONCOLOGY | Age: 67
End: 2022-01-05
Payer: MEDICARE

## 2022-01-05 DIAGNOSIS — Z17.0 MALIGNANT NEOPLASM OF UPPER-INNER QUADRANT OF LEFT BREAST IN FEMALE, ESTROGEN RECEPTOR POSITIVE (HCC): ICD-10-CM

## 2022-01-05 DIAGNOSIS — Z79.811 LONG TERM (CURRENT) USE OF AROMATASE INHIBITORS: ICD-10-CM

## 2022-01-05 DIAGNOSIS — D50.8 IRON DEFICIENCY ANEMIA SECONDARY TO INADEQUATE DIETARY IRON INTAKE: Primary | ICD-10-CM

## 2022-01-05 DIAGNOSIS — C50.212 MALIGNANT NEOPLASM OF UPPER-INNER QUADRANT OF LEFT FEMALE BREAST, UNSPECIFIED ESTROGEN RECEPTOR STATUS (HCC): ICD-10-CM

## 2022-01-05 DIAGNOSIS — C50.212 MALIGNANT NEOPLASM OF UPPER-INNER QUADRANT OF LEFT BREAST IN FEMALE, ESTROGEN RECEPTOR POSITIVE (HCC): ICD-10-CM

## 2022-01-05 PROCEDURE — 99442 PR PHYS/QHP TELEPHONE EVALUATION 11-20 MIN: CPT | Performed by: NURSE PRACTITIONER

## 2022-01-05 NOTE — PROGRESS NOTES
Mirna Ledesma is a 78 y.o. female, evaluated via audio-only technology on 1/5/2022 for Follow-up  . Assessment & Plan:   # Functional iron deficiency anemia:   -- The patient was advised to continue to take the Slow Fe 1 tablet daily. -- Iron saturation was 23% and ferritin was 317.  -- Continue to follow up with GI as schedule        # Invasive ductal adenocarcinoma left breast  -- I have encouraged the patient to continue with her self breast exams. She will also continue with annual screening mammography. --- Mammogram done on 6/16/2021 showed no mammographic evidence of malignancy   -- Femara 2.5mg PO daily will be continued. She would like to remain on the medication for full 10 years   --  labs on 11/8/2021 reviewed with patient today       # Long-term current use of aromatase inhibitors:   # Bone Health:   -- Advised on adequate amounts of calcium (at least 1,200 mg per day) and vitamin D(800-1,000 IU per day). The higher dose of vitamin D may be needed if vitamin D deficiency. -- Recommend regular weight-bearing and muscle-strengthening exercise to reduce the risk of falls and fractures. -- Advise avoidance of tobacco smoking and excessive alcohol intake. --pt is up to date with her  DEXA scan from her PCP on 7/09/2020     #Weight loss stable   -- Patient report appetite  is better and she is gaining weight back   --- Recent CT of chest/abd/pelivic and bone scan reviewed with patient today         -- I will see the patient back in clinic in about 4 months. Always sooner if required. The patient can have lab done prior our next clinic visit. 12    CT Results (most recent):  Results from Orders Only encounter on 11/22/21    CT CHEST ABD PELV W CONT    Narrative  EXAM:  CT CHEST-ABDOMEN-PELVIS WITH CONTRAST. CLINICAL INDICATION:    - Malignant neoplasm of upper-inner quadrant of left breast in female, estrogen  receptor positive (C50.212, Z17.0)  - Follow up left breast cancer.   Lumpectomy and radiation finished in 2017. COMPARISON:  12/29/08. TECHNIQUE:    - Helically scanned volumetric axial sections of the chest, abdomen and pelvis  are obtained following IV and oral contrast administration. Coronal and  sagittal reconstruction images are generated to improve anatomic delineation.  - IV contrast 100 mL Isovue-300. - Radiation dose optimization techniques are utilized as appropriate to the  exam, with combination of automated exposure control, adjustment of the mA  and/or kV according to patient's size (Including appropriate matching for  site-specific examinations), or use of iterative reconstruction technique. FINDINGS:    CT CHEST    Lungs, Pleura:  No focal infiltrate or consolidation is detected. No discrete  mass or suspicious nodules. No significant pleural effusion. Mediastinum:  No adenopathy. Cardiovascular:  No acute abnormalities. Base of Neck:  Visualized base of the neck is unremarkable. Axillae:  No evidence of significant axillary adenopathy. Esophagus:  Unremarkable. CT ABDOMEN-PELVIS    Liver, Gallbladder, Spleen, Pancreas, Adrenal Glands:  Unremarkable. Kidneys:  1.3 x 0.9 cm cyst in the right kidney (axial #35). 1.3 x 0.8 cm  hypodensity in the left kidney near the lower pole (axial #36). Gastrointestinal Tract:  Unremarkable stomach and small bowel. No evidence of  acute appendicitis. No acute findings along the colon. Stool retention. Moderate diverticulosis coli. Retroperitoneum:  No adenopathy. Vascular:  No aneurysm. Abdominal Wall:  Small umbilical hernia. Bladder, Uterus, Adnexa:  Contracted urinary bladder. S/P hysterectomy. No  adnexal mass. Skeletal Structures:  Grade 1 spondylolisthesis at L5-S1 the basis of facet  arthropathy. Degenerative changes in the lumbar spine. Impression  1. No acute abnormalities in the chest.    2.  Renal hypodensities, favor renal cysts.     3.  No acute abnormalities along the GI tract.    4.  No convincing evidence of metastatic lesions. Nuclear Medicine Results (most recent):  Results from Orders Only encounter on 11/22/21    NM BONE SCAN [address] BODY    Narrative  WHOLE BODY BONE SCAN    INDICATION: Left breast cancer    COMPARISON: CT chest/abdomen/pelvis 12/10/2021    TECHNIQUE: Following intravenous administration of 27.3 mCi 99mTc-MDP at the  right antecubital fossa, approximately three hour delayed whole body images were  obtained in anterior and posterior projections. FINDINGS:  -No obvious abnormal radiopharmaceutical localization suspicious for osseous  metastatic disease.    -Foci of mild to moderate radiotracer activity at the thoracic spine and lower  lumbar spine, appears to correspond with degenerative changes seen on CT scan.    -Additional presumed degenerative pattern of radiotracer activity noted at the  sternoclavicular joints, lower cervical spine, elbows, hips, feet/ankles, and  right knee. -There is radiotracer activity is noted at the soft tissues of the left breast.  Compared to accompanying CT scan of 12/10/2021 there is skin thickening at the  left breast (report of outside CT exams from 4/2021 and 3/2021 described  nonspecific left breast skin thickening and diffuse stranding, but those images  are not available for direct comparison). -There is expected soft tissue radiotracer activity in the kidneys and urinary  bladder. Impression  1. No convincing evidence of osseous metastatic disease. 2. Presumed degenerative pattern of radiotracer activity as described. 3. Nonspecific soft tissue radiotracer activity at the left breast, in keeping  with reported history of left breast cancer and/or possible posttreatment  changes. Most recent mammogram report from 6/2021 was negative, but images are  not available for direct comparison. Lopez Cohen last  Subjective:   Mrs. Paulina Mcnamara is a 54-year-old woman who has a history of invasive ductal adenocarcinoma involving the left breast. Today she is doing well overall. She report that her weight is stable at this time and has started gaining weight back. She continues to take Femara 2.5mg PO daily and is tolerating the treatment reasonably well. She denied any significant distress since last visit. She denied any fever, chills, skin lumps or bumps, acute bleeding, bruising. She denied any nausea, vomiting, abdominal pain, diarrhea, focal weakness or numbness, new pain or bone pain. Prior to Admission medications    Medication Sig Start Date End Date Taking? Authorizing Provider   letrozole [facility]) 2.5 mg tablet Take 1 Tablet by mouth daily. 12/27/21   Eugenie Baig Hand, DNP   amLODIPine (NORVASC) 2.5 mg tablet TAKE 1 TABLET BY MOUTH ONCE DAILY 11/11/20   Provider, Historical   Biotin 2,500 mcg cap Take  by mouth. Take 1,000 daily    Provider, Historical   aspirin delayed-release 81 mg tablet Take 81 mg by mouth daily. Provider, Historical   GaviLyte-G 775-23.68-2.97 -5.86 gram suspension AS PER PREP INSTRUCTIONS 6/3/20   Provider, Historical   losartan (COZAAR) 50 mg tablet TAKE 1 TABLET BY MOUTH NIGHTLY FOR HIGH BLOOD PRESSURE 7/6/20   Corrinne Seton, Dena K,    ciclopirox (LOPROX) 0.77 % topical cream APPLY CREAM TO SOLES AND SIDES OF FEET TWICE DAILY 6/26/19   Provider, Historical   terbinafine HCl (LAMISIL) 250 mg tablet TAKE 1 TABLET BY MOUTH ONCE DAILY 6/19/19   Provider, Historical   cholecalciferol (VITAMIN D3) 1,000 unit tablet 1,000 Units. Provider, Historical   Ferrous Fumarate 325 mg (106 mg iron) tab Take  by Mouth Once a Day. Provider, Historical   docosahexanoic acid/epa (FISH OIL PO) Take  by Mouth Once a Day. Provider, Historical   metFORMIN (GLUMETZA ER) 500 mg TG24 24 hour tablet Take 1 Tab by Mouth Once a Day. 9/29/17   Provider, Historical   multivitamin (ONE A DAY) tablet Take 1 Tab by Mouth Once a Day.      Provider, Historical   simvastatin (ZOCOR) 40 mg tablet 20 mg.    Provider, Historical   triamterene-hydroCHLOROthiazide (MAXZIDE) 75-50 mg per tablet  8/10/18   Provider, Historical         Review of Systems   Constitutional: Negative. HENT: Negative. Eyes: Negative. Respiratory: Negative. Cardiovascular: Negative. Gastrointestinal: Negative. Genitourinary: Negative. Musculoskeletal: Negative. Skin: Negative. Neurological: Negative. Endo/Heme/Allergies: Negative. Psychiatric/Behavioral: Negative. Patient-Reported Vitals 1/5/2022   Patient-Reported Weight 162lb       Elie Borden, who was evaluated through a patient-initiated, synchronous (real-time) audio only encounter, and/or her healthcare decision maker, is aware that it is a billable service, with coverage as determined by her insurance carrier. She provided verbal consent to proceed: YES-Consent obtained within past 12 months Yes. . She has not had a related appointment within my department in the past 7 days or scheduled within the next 24 hours.     Time Documentation 20 min     Abraham Avila NP

## 2022-03-18 PROBLEM — C50.212 MALIGNANT NEOPLASM OF UPPER-INNER QUADRANT OF LEFT FEMALE BREAST (HCC): Status: ACTIVE | Noted: 2017-04-11

## 2022-03-20 PROBLEM — D50.8 IRON DEFICIENCY ANEMIA SECONDARY TO INADEQUATE DIETARY IRON INTAKE: Status: ACTIVE | Noted: 2017-09-01

## 2022-03-24 ENCOUNTER — PATIENT MESSAGE (OUTPATIENT)
Dept: ONCOLOGY | Age: 67
End: 2022-03-24

## 2022-03-24 DIAGNOSIS — C50.212 MALIGNANT NEOPLASM OF UPPER-INNER QUADRANT OF LEFT BREAST IN FEMALE, ESTROGEN RECEPTOR POSITIVE (HCC): ICD-10-CM

## 2022-03-24 DIAGNOSIS — Z79.811 LONG TERM (CURRENT) USE OF AROMATASE INHIBITORS: ICD-10-CM

## 2022-03-24 DIAGNOSIS — C50.212 MALIGNANT NEOPLASM OF UPPER-INNER QUADRANT OF LEFT FEMALE BREAST, UNSPECIFIED ESTROGEN RECEPTOR STATUS (HCC): ICD-10-CM

## 2022-03-24 DIAGNOSIS — D50.8 IRON DEFICIENCY ANEMIA SECONDARY TO INADEQUATE DIETARY IRON INTAKE: ICD-10-CM

## 2022-03-24 DIAGNOSIS — Z17.0 MALIGNANT NEOPLASM OF UPPER-INNER QUADRANT OF LEFT BREAST IN FEMALE, ESTROGEN RECEPTOR POSITIVE (HCC): ICD-10-CM

## 2022-03-24 RX ORDER — LETROZOLE 2.5 MG/1
2.5 TABLET, FILM COATED ORAL DAILY
Qty: 90 TABLET | Refills: 0 | Status: SHIPPED | OUTPATIENT
Start: 2022-03-24 | End: 2022-06-20 | Stop reason: SDUPTHER

## 2022-03-24 NOTE — TELEPHONE ENCOUNTER
From: Tejinder Larkin  To: Son Tapia DNP  Sent: 3/24/2022 12:11 PM EDT  Subject: Prescription Refill    I only have 5 pills left of the Letrozole 2.5MG Tab TEV. Please refill. Thank you.

## 2022-04-22 ENCOUNTER — APPOINTMENT (OUTPATIENT)
Dept: ONCOLOGY | Age: 67
End: 2022-04-22

## 2022-04-22 ENCOUNTER — HOSPITAL ENCOUNTER (OUTPATIENT)
Dept: LAB | Age: 67
Discharge: HOME | End: 2022-04-22
Payer: MEDICARE

## 2022-04-22 DIAGNOSIS — C50.212 MALIGNANT NEOPLASM OF UPPER-INNER QUADRANT OF LEFT FEMALE BREAST, UNSPECIFIED ESTROGEN RECEPTOR STATUS (HCC): ICD-10-CM

## 2022-04-22 DIAGNOSIS — D50.8 IRON DEFICIENCY ANEMIA SECONDARY TO INADEQUATE DIETARY IRON INTAKE: ICD-10-CM

## 2022-04-22 DIAGNOSIS — C50.212 MALIGNANT NEOPLASM OF UPPER-INNER QUADRANT OF LEFT BREAST IN FEMALE, ESTROGEN RECEPTOR POSITIVE (HCC): ICD-10-CM

## 2022-04-22 DIAGNOSIS — Z79.811 LONG TERM (CURRENT) USE OF AROMATASE INHIBITORS: ICD-10-CM

## 2022-04-22 DIAGNOSIS — Z17.0 MALIGNANT NEOPLASM OF UPPER-INNER QUADRANT OF LEFT BREAST IN FEMALE, ESTROGEN RECEPTOR POSITIVE (HCC): ICD-10-CM

## 2022-04-22 LAB
ALBUMIN SERPL-MCNC: 4 G/DL (ref 3.4–5)
ALBUMIN/GLOB SERPL: 1.2 {RATIO} (ref 0.8–1.7)
ALP SERPL-CCNC: 114 U/L (ref 45–117)
ALT SERPL-CCNC: 21 U/L (ref 13–56)
ANION GAP SERPL CALC-SCNC: 3 MMOL/L (ref 3–18)
AST SERPL-CCNC: 16 U/L (ref 10–38)
BASOPHILS # BLD: 0.1 K/UL (ref 0–0.1)
BASOPHILS NFR BLD: 1 % (ref 0–2)
BILIRUB SERPL-MCNC: 0.7 MG/DL (ref 0.2–1)
BUN SERPL-MCNC: 14 MG/DL (ref 7–18)
BUN/CREAT SERPL: 21 (ref 12–20)
CALCIUM SERPL-MCNC: 9.3 MG/DL (ref 8.5–10.1)
CHLORIDE SERPL-SCNC: 107 MMOL/L (ref 100–111)
CO2 SERPL-SCNC: 30 MMOL/L (ref 21–32)
CREAT SERPL-MCNC: 0.66 MG/DL (ref 0.6–1.3)
DIFFERENTIAL METHOD BLD: ABNORMAL
EOSINOPHIL # BLD: 0.1 K/UL (ref 0–0.4)
EOSINOPHIL NFR BLD: 1 % (ref 0–5)
ERYTHROCYTE [DISTWIDTH] IN BLOOD BY AUTOMATED COUNT: 13.8 % (ref 11.6–14.5)
FERRITIN SERPL-MCNC: 325 NG/ML (ref 8–388)
GLOBULIN SER CALC-MCNC: 3.4 G/DL (ref 2–4)
GLUCOSE SERPL-MCNC: 89 MG/DL (ref 74–99)
HCT VFR BLD AUTO: 34.5 % (ref 35–45)
HGB BLD-MCNC: 11 G/DL (ref 12–16)
IMM GRANULOCYTES # BLD AUTO: 0 K/UL (ref 0–0.04)
IMM GRANULOCYTES NFR BLD AUTO: 0 % (ref 0–0.5)
IRON SATN MFR SERPL: 30 % (ref 20–50)
IRON SERPL-MCNC: 85 UG/DL (ref 50–175)
LYMPHOCYTES # BLD: 1.2 K/UL (ref 0.9–3.6)
LYMPHOCYTES NFR BLD: 21 % (ref 21–52)
MCH RBC QN AUTO: 30.2 PG (ref 24–34)
MCHC RBC AUTO-ENTMCNC: 31.9 G/DL (ref 31–37)
MCV RBC AUTO: 94.8 FL (ref 78–100)
MONOCYTES # BLD: 0.5 K/UL (ref 0.05–1.2)
MONOCYTES NFR BLD: 8 % (ref 3–10)
NEUTS SEG # BLD: 4 K/UL (ref 1.8–8)
NEUTS SEG NFR BLD: 69 % (ref 40–73)
NRBC # BLD: 0 K/UL (ref 0–0.01)
NRBC BLD-RTO: 0 PER 100 WBC
PLATELET # BLD AUTO: 160 K/UL (ref 135–420)
PMV BLD AUTO: 12.8 FL (ref 9.2–11.8)
POTASSIUM SERPL-SCNC: 3.9 MMOL/L (ref 3.5–5.5)
PROT SERPL-MCNC: 7.4 G/DL (ref 6.4–8.2)
RBC # BLD AUTO: 3.64 M/UL (ref 4.2–5.3)
SODIUM SERPL-SCNC: 140 MMOL/L (ref 136–145)
TIBC SERPL-MCNC: 283 UG/DL (ref 250–450)
WBC # BLD AUTO: 5.8 K/UL (ref 4.6–13.2)

## 2022-04-22 PROCEDURE — 36415 COLL VENOUS BLD VENIPUNCTURE: CPT

## 2022-04-22 PROCEDURE — 83540 ASSAY OF IRON: CPT

## 2022-04-22 PROCEDURE — 85025 COMPLETE CBC W/AUTO DIFF WBC: CPT

## 2022-04-22 PROCEDURE — 82728 ASSAY OF FERRITIN: CPT

## 2022-04-22 PROCEDURE — 86300 IMMUNOASSAY TUMOR CA 15-3: CPT

## 2022-04-22 PROCEDURE — 80053 COMPREHEN METABOLIC PANEL: CPT

## 2022-04-23 LAB — CANCER AG27-29 SERPL-ACNC: 9.2 U/ML (ref 0–38.6)

## 2022-05-06 ENCOUNTER — OFFICE VISIT (OUTPATIENT)
Dept: ONCOLOGY | Age: 67
End: 2022-05-06
Payer: MEDICARE

## 2022-05-06 VITALS
HEIGHT: 69 IN | WEIGHT: 164 LBS | OXYGEN SATURATION: 99 % | DIASTOLIC BLOOD PRESSURE: 80 MMHG | SYSTOLIC BLOOD PRESSURE: 129 MMHG | RESPIRATION RATE: 18 BRPM | BODY MASS INDEX: 24.29 KG/M2 | HEART RATE: 61 BPM

## 2022-05-06 DIAGNOSIS — C50.212 MALIGNANT NEOPLASM OF UPPER-INNER QUADRANT OF LEFT BREAST IN FEMALE, ESTROGEN RECEPTOR POSITIVE (HCC): Primary | ICD-10-CM

## 2022-05-06 DIAGNOSIS — D50.8 IRON DEFICIENCY ANEMIA SECONDARY TO INADEQUATE DIETARY IRON INTAKE: ICD-10-CM

## 2022-05-06 DIAGNOSIS — C50.212 MALIGNANT NEOPLASM OF UPPER-INNER QUADRANT OF LEFT FEMALE BREAST, UNSPECIFIED ESTROGEN RECEPTOR STATUS (HCC): ICD-10-CM

## 2022-05-06 DIAGNOSIS — Z79.811 LONG TERM (CURRENT) USE OF AROMATASE INHIBITORS: ICD-10-CM

## 2022-05-06 DIAGNOSIS — Z17.0 MALIGNANT NEOPLASM OF UPPER-INNER QUADRANT OF LEFT BREAST IN FEMALE, ESTROGEN RECEPTOR POSITIVE (HCC): Primary | ICD-10-CM

## 2022-05-06 DIAGNOSIS — D64.9 CHRONIC ANEMIA: ICD-10-CM

## 2022-05-06 PROCEDURE — G8536 NO DOC ELDER MAL SCRN: HCPCS | Performed by: INTERNAL MEDICINE

## 2022-05-06 PROCEDURE — 99213 OFFICE O/P EST LOW 20 MIN: CPT | Performed by: INTERNAL MEDICINE

## 2022-05-06 PROCEDURE — G8420 CALC BMI NORM PARAMETERS: HCPCS | Performed by: INTERNAL MEDICINE

## 2022-05-06 PROCEDURE — G8432 DEP SCR NOT DOC, RNG: HCPCS | Performed by: INTERNAL MEDICINE

## 2022-05-06 PROCEDURE — 1090F PRES/ABSN URINE INCON ASSESS: CPT | Performed by: INTERNAL MEDICINE

## 2022-05-06 PROCEDURE — G8427 DOCREV CUR MEDS BY ELIG CLIN: HCPCS | Performed by: INTERNAL MEDICINE

## 2022-05-06 PROCEDURE — 1101F PT FALLS ASSESS-DOCD LE1/YR: CPT | Performed by: INTERNAL MEDICINE

## 2022-05-06 PROCEDURE — G8399 PT W/DXA RESULTS DOCUMENT: HCPCS | Performed by: INTERNAL MEDICINE

## 2022-05-06 PROCEDURE — 3017F COLORECTAL CA SCREEN DOC REV: CPT | Performed by: INTERNAL MEDICINE

## 2022-05-06 PROCEDURE — G0463 HOSPITAL OUTPT CLINIC VISIT: HCPCS | Performed by: INTERNAL MEDICINE

## 2022-05-06 NOTE — PROGRESS NOTES
Hematology/Oncology  Progress Note    Name: Anthony Dodson  : 1955   DATE: 22    PCP: Sky Donohue MD     Ms. Jude Michele is a 75 y.o. female who was seen for management of her invasive ductal adenocarcinoma of the left breast.    Current therapy: Femara 2.5mg PO daily started May 2017    Subjective:   Ms. Anthony Dodson is a 75 y.o. female who was seen for management of her Invasive Ductal Adenocarcinoma of the Left Breast. She is currently taking Letrozole 2.5mg PO daily and she states she is tolerating this well without any side effects. Today she reported doing well. She denies any fevers, chills, recurrent infections, and skin rash. She denies shortness of breath, dizziness, chest pains, fatigue, and palpitations. She denies any abdominal pains, nausea, vomiting, diarrhea, and constipation. She denies pain or any discomfort. She does not have any other concerns or complaints to report at this time. Past medical history, family history, and social history: these were reviewed and remains unchanged.     Past Medical History:   Diagnosis Date    Anemia     Cancer [facility])      Past Surgical History:   Procedure Laterality Date    HX HYSTERECTOMY      [place] BREAST SURGERY PROCEDURE UNLISTED       Social History     Socioeconomic History    Marital status:      Spouse name: Not on file    Number of children: Not on file    Years of education: Not on file    Highest education level: Not on file   Occupational History    Not on file   Tobacco Use    Smoking status: Never Smoker    Smokeless tobacco: Never Used   Substance and Sexual Activity    Alcohol use: No    Drug use: Not Currently    Sexual activity: Not on file   Other Topics Concern    Not on file   Social History Narrative    Not on file     Social Determinants of Health     Financial Resource Strain:     Difficulty of Paying Living Expenses: Not on file   Food Insecurity:     Worried About [address] [employer] in the Last Year: Not on file    Ran Out of Food in the Last Year: Not on file   Transportation Needs:     Lack of Transportation (Medical): Not on file    Lack of Transportation (Non-Medical): Not on file   Physical Activity:     Days of Exercise per Week: Not on file    Minutes of Exercise per Session: Not on file   Stress:     Feeling of Stress : Not on file   Social Connections:     Frequency of Communication with Friends and Family: Not on file    Frequency of Social Gatherings with Friends and Family: Not on file    Attends [employer] Services: Not on file    Active Member of Clubs or Organizations: Not on file    Attends Club or Organization Meetings: Not on file    Marital Status: Not on file   Intimate Partner Violence:     Fear of Current or Ex-Partner: Not on file    Emotionally Abused: Not on file    Physically Abused: Not on file    Sexually Abused: Not on file   Housing Stability:     Unable to Pay for Housing in the Last Year: Not on file    Number of [place] in the Last Year: Not on file    Unstable Housing in the Last Year: Not on file     Family History   Problem Relation Age of Onset    Heart Disease Mother     Cancer Mother         colon    Diabetes Father     Cancer Brother         prostate    Alzheimer's Disease Paternal Uncle     Other Maternal Grandfather         old age   [place] Cancer Brother         colon    Alzheimer's Disease Cousin      Current Outpatient Medications   Medication Sig Dispense Refill    letrozole ([address] [facility]) 2.5 mg tablet Take 1 Tablet by mouth daily. 90 Tablet 0    amLODIPine (NORVASC) 2.5 mg tablet TAKE 1 TABLET BY MOUTH ONCE DAILY      Biotin 2,500 mcg cap Take  by mouth. Take 1,000 daily      aspirin delayed-release 81 mg tablet Take 81 mg by mouth daily.       GaviLyte-G 236-22.74-6.74 -5.86 gram suspension AS PER PREP INSTRUCTIONS      losartan (COZAAR) 50 mg tablet TAKE 1 TABLET BY MOUTH NIGHTLY FOR HIGH BLOOD PRESSURE 30 Tab 0    ciclopirox (LOPROX) 0.77 % topical cream APPLY CREAM TO SOLES AND SIDES OF FEET TWICE DAILY  99    terbinafine HCl (LAMISIL) 250 mg tablet TAKE 1 TABLET BY MOUTH ONCE DAILY  0    cholecalciferol (VITAMIN D3) 1,000 unit tablet 1,000 Units.  Ferrous Fumarate 325 mg (106 mg iron) tab Take  by Mouth Once a Day.  docosahexanoic acid/epa (FISH OIL PO) Take  by Mouth Once a Day.  metFORMIN (GLUMETZA ER) 500 mg TG24 24 hour tablet Take 1 Tab by Mouth Once a Day.  multivitamin (ONE A DAY) tablet Take 1 Tab by Mouth Once a Day.  simvastatin (ZOCOR) 40 mg tablet 20 mg.      triamterene-hydroCHLOROthiazide (MAXZIDE) 75-50 mg per tablet          Review of Systems   Constitutional: Positive for weight loss. Negative for chills, diaphoresis, fever and malaise/fatigue. Respiratory: Negative for cough, hemoptysis, shortness of breath and wheezing. Cardiovascular: Negative for chest pain, palpitations and leg swelling. Gastrointestinal: Negative for abdominal pain, diarrhea, heartburn, nausea and vomiting. Genitourinary: Negative for dysuria, frequency, hematuria and urgency. Musculoskeletal: Negative for joint pain and myalgias. Skin: Negative for itching and rash. Neurological: Negative for dizziness, seizures, weakness and headaches. Psychiatric/Behavioral: Negative for depression. The patient does not have insomnia. Objective:     Visit Vitals  /80   Pulse 61   Resp 18   Ht 5' 9\" (1.753 m)   Wt 74.4 kg (164 lb)   SpO2 99%   BMI 24.22 kg/m²       ECOG Performance Status (grade): 0  0 - able to carry on all pre-disease activity w/out restriction  1 - restricted but able to carry out light work  2 - ambulatory and can self- care but unable to carry out work  3 - bed or chair >50% of waking hours  4 - completely disable, total care, confined to bed or chair    Physical Exam  Constitutional:       Appearance: Normal appearance. HENT:      Head: Normocephalic and atraumatic.    Eyes: Pupils: Pupils are equal, round, and reactive to light. Cardiovascular:      Rate and Rhythm: Normal rate and regular rhythm. Heart sounds: Normal heart sounds. Pulmonary:      Effort: Pulmonary effort is normal.      Breath sounds: Normal breath sounds. Abdominal:      General: Bowel sounds are normal.      Palpations: Abdomen is soft. Tenderness: There is no abdominal tenderness. There is no guarding. Musculoskeletal:         General: Normal range of motion. Cervical back: Neck supple. Skin:     General: Skin is warm. Neurological:      General: No focal deficit present. Mental Status: She is alert and oriented to person, place, and time. Mental status is at baseline. DEXA Results (most recent):  Results from Hospital Encounter encounter on 07/09/20    DEXA BONE DENSITY STUDY AXIAL    Narrative  DEXA BONE DENSITOMETRY, CENTRAL    CLINICAL INDICATION/HISTORY: Post menopausal. 25-year-old female for baseline  study. History of breast cancer. Taking Femara and vitamin D.    TECHNIQUE: Using GE LUNAR Prodigy densitometer, bone density measurement was  performed in the lumbar spine, the proximal left and right femora. T Score  refers to standard deviations above or below average compared to a young adult  of the same sex. Z Score refers to standard deviations above or below average  compared to a patient of the same sex, age, race and weight. COMPARISON: None available. FINDINGS:    Lumbar Spine Levels: L1-L3  Mean Bone Mineral Density (BMD):  1.065 g/cm2  T Score: -1.0  Z Score: -0.5    On PA image of the lumbar spine obtained for localization of vertebral levels  (not a diagnostic quality radiograph), there is apparent increased density at  L4. The density is not well characterized on the basis of this image, but  likely degenerative. Since this density spuriously increases measured bone  mineral density, this has been excluded.     Left Total Proximal Femur BMD: 0.870 g/cm2  T Score: -1.1  Z Score: -1.1    Right Total Proximal Femur BMD: 0.888 g/cm2  T Score: -0.9  Z Score: -1.0    Left Femoral Neck BMD: 0.925  g/cm2  T Score: -0.8  Z Score: -0.5    Right Femoral Neck BMD: 0.925  g/cm2  T Score: -0.8  Z Score: -0.5    Impression  IMPRESSION:    BMD measures consistent with osteopenia/low bone density. Based upon current ISCD guidelines, the patient's overall diagnostic category,  selected using WHO criteria in postmenopausal women and males aged 48 and above,  is selected based upon the lowest T Score from among the lumbar spine, total  femur, femoral neck, (or distal third radius if measured). WHO Definition of Osteoporosis and Osteopenia on DXA (specified for post  menopausal  females):    Normal:                     T Score at or above -1 SD  Osteopenia:              T Score between -1 and -2.5 SD  Osteoporosis:          T Score at or below -2.5 SD    The risk of fracture approximately doubles for each 1 SD decrease in T Score. It is important to consider other factors in assessing a patient's risk of  fracture, including age, risk of falling/injury, history of fragility fracture,  family history of osteoporosis, smoking, low weight. Various fracture risk tools have been developed for adult patients and are  available online. For example, the FRAX tool developed by [facility] is widely used. Reference www.iscd.org. It is also important to note that DXA measures bone density but does not  distinguish among causes of decreased bone density, which include primary versus  secondary osteoporosis (such as metabolic bone disorders or possible effects of  medications) and also other conditions (such as osteomalacia). Clinical  considerations should determine what additional evaluation may be warranted to  exclude secondary conditions in a patient with low bone density.     Please note that reliable, valid comparisons can not be made between studies  which have been performed on different densitometers. If clinically warranted,  follow up study performed at this site would best permit assessment of trend for  possible change in bone mineral density over time in comparison to this study. Thank you for this referral.    [facility] Results (most recent):  Results from [place] encounter on 03/25/16    [address]  PROCEDURE:  Digital Mammograms - Bilateral Screening. CPT:  , [id number]    INDICATION:  Routine screening. COMPARISON:  1/12/15, 1/9/14, 7/8/13    FINDINGS:    - Technique:  Full-field direct digital CC and MLO views. - CAD:  Images are interpreted in conjunction with iCAD ([employer] version 7.2  high-setting). Any areas annotated on the CAD are correlated with visualized  mammographic findings using digital magnification of the images and correlation  with comparison studies.  - Breast parenchymal pattern:  Fatty replaced. No dominant mass, suspicious new microcalcification cluster or architectural  distortion is detected. No potential findings are detected on iCAD. No  significant interval changes are observed. Impression  :    Benign mammograms. Recommend screening mammograms in one year. BIRADS category:  2. Benign. Note: 10% to 15% of breast cancers may not be identified by mammography. Dense  breast parenchyma can obscure an underlying neoplasm and some types of breast  cancers are not well shown on the mammogram.  A negative mammogram report should  not delay further evaluation if clinically suspicious findings, i.e. palpable  firm nodules are present.       LABS  Lab Results   Component Value Date/Time    WBC 5.8 04/22/2022 10:20 AM    HGB 11.0 (L) 04/22/2022 10:20 AM    HCT 34.5 (L) 04/22/2022 10:20 AM    PLATELET 229 53/16/5594 10:20 AM    MCV 94.8 04/22/2022 10:20 AM     Lab Results   Component Value Date/Time    Sodium 140 04/22/2022 10:20 AM    Potassium 3.9 04/22/2022 10:20 AM    Chloride 107 04/22/2022 10:20 AM    CO2 30 04/22/2022 10:20 AM    Anion gap 3 04/22/2022 10:20 AM    Glucose 89 04/22/2022 10:20 AM    BUN 14 04/22/2022 10:20 AM    Creatinine 0.66 04/22/2022 10:20 AM    BUN/Creatinine ratio 21 (H) 04/22/2022 10:20 AM    GFR est AA >60 04/22/2022 10:20 AM    GFR est non-AA >60 04/22/2022 10:20 AM    Calcium 9.3 04/22/2022 10:20 AM    Bilirubin, total 0.7 04/22/2022 10:20 AM    Alk. phosphatase 114 04/22/2022 10:20 AM    Protein, total 7.4 04/22/2022 10:20 AM    Albumin 4.0 04/22/2022 10:20 AM    Globulin 3.4 04/22/2022 10:20 AM    A-G Ratio 1.2 04/22/2022 10:20 AM    ALT (SGPT) 21 04/22/2022 10:20 AM    AST (SGOT) 16 04/22/2022 10:20 AM     Lab Results   Component Value Date/Time    Iron 85 04/22/2022 10:20 AM    TIBC 283 04/22/2022 10:20 AM    Iron % saturation 30 04/22/2022 10:20 AM    Ferritin 325 04/22/2022 10:20 AM         Assessment:   Invasive ductal adenocarcinoma left breast  Long-term current use of aromatase inhibitors:   Bone Health  Functional iron deficiency anemia  Weight Loss    Plan:   Invasive ductal adenocarcinoma left breast  -- Patient was being followed previously by Dr. Sourav Husain who retired. -- Clinical stable. Recent lab report was reviewed with patient today. --Mammogram done on 6/16/2021 showed no mammographic evidence of malignancy    Plan:  -- The patient was instructed to continue doing her breast exams on a monthly basis. -- The patient was advised to notify us if any new breast pain, new palpable nodule, nipple inversion, spontaneous nipple discharge especially if bloody, breast skin changes, unintentional weight loss, worsen fatigue, new bone pain or back pain, or any concerns. -- I have advised her to follow up her PCP to continue age-appropriate cancer screening. -- I have educated her regarding lifestyle modifications, minimizing alcohol intake, refraining from smoking, healthy diet, and physical activity.   -- The patient will continue follows up with mammogram as recommended. -- Follow up in 6 months, always sooner if required. Long-term current use of aromatase inhibitors  Bone Health  --Femara 2.5mg PO daily will be continued. She would like to remain on the medication for full 10 years   --Advised on adequate amounts of calcium (at least 1,200 mg per day) and vitamin D(800-1,000 IU per day). The higher dose of vitamin D may be needed if vitamin D deficiency. --Recommended regular weight-bearing and muscle-strengthening exercise to reduce the risk of falls and fractures. --Advised avoidance of tobacco smoking and excessive alcohol intake. -- she will continue to follow up on her DEXA scan with her PCP    Functional iron deficiency anemia:   --The patient was advised to continue to take the Slow Fe 1 tab every other day  --Iron saturation was 30% and ferritin was 325.   --Continue to follow up with GI as scheduled           Orders Placed This Encounter    METABOLIC PANEL, COMPREHENSIVE     Standing Status:   Future     Standing Expiration Date:   5/7/2023    IRON PROFILE     Standing Status:   Future     Standing Expiration Date:   5/6/2023    FERRITIN     Standing Status:   Future     Standing Expiration Date:   5/6/2023    CBC WITH AUTOMATED DIFF     Standing Status:   Future     Standing Expiration Date:   5/7/2023    CA 27.29     Standing Status:   Future     Standing Expiration Date:   5/6/2023         Prashant Skelton MD    05/06/22        CC: Jhonathan Helton NP

## 2022-06-19 ENCOUNTER — PATIENT MESSAGE (OUTPATIENT)
Dept: ONCOLOGY | Age: 67
End: 2022-06-19

## 2022-06-19 DIAGNOSIS — C50.212 MALIGNANT NEOPLASM OF UPPER-INNER QUADRANT OF LEFT FEMALE BREAST, UNSPECIFIED ESTROGEN RECEPTOR STATUS (HCC): ICD-10-CM

## 2022-06-19 DIAGNOSIS — C50.212 MALIGNANT NEOPLASM OF UPPER-INNER QUADRANT OF LEFT BREAST IN FEMALE, ESTROGEN RECEPTOR POSITIVE (HCC): ICD-10-CM

## 2022-06-19 DIAGNOSIS — Z79.811 LONG TERM (CURRENT) USE OF AROMATASE INHIBITORS: ICD-10-CM

## 2022-06-19 DIAGNOSIS — Z17.0 MALIGNANT NEOPLASM OF UPPER-INNER QUADRANT OF LEFT BREAST IN FEMALE, ESTROGEN RECEPTOR POSITIVE (HCC): ICD-10-CM

## 2022-06-19 DIAGNOSIS — D50.8 IRON DEFICIENCY ANEMIA SECONDARY TO INADEQUATE DIETARY IRON INTAKE: ICD-10-CM

## 2022-06-20 RX ORDER — LETROZOLE 2.5 MG/1
2.5 TABLET, FILM COATED ORAL DAILY
Qty: 90 TABLET | Refills: 0 | Status: SHIPPED | OUTPATIENT
Start: 2022-06-20 | End: 2022-09-16

## 2022-06-20 NOTE — TELEPHONE ENCOUNTER
From: Coretta Decker  To: Dagoberto Lozano DNP  Sent: 6/19/2022 9:19 PM EDT  Subject: Prescription Refill    I need a prescription filled please. I need Letrozole tablets (2.5 mg.) Thank you so very much.

## 2022-07-26 ENCOUNTER — APPOINTMENT (OUTPATIENT)
Dept: NUTRITION | Age: 67
End: 2022-07-26

## 2022-09-14 DIAGNOSIS — C50.212 MALIGNANT NEOPLASM OF UPPER-INNER QUADRANT OF LEFT FEMALE BREAST, UNSPECIFIED ESTROGEN RECEPTOR STATUS (HCC): ICD-10-CM

## 2022-09-14 DIAGNOSIS — Z17.0 MALIGNANT NEOPLASM OF UPPER-INNER QUADRANT OF LEFT BREAST IN FEMALE, ESTROGEN RECEPTOR POSITIVE (HCC): ICD-10-CM

## 2022-09-14 DIAGNOSIS — C50.212 MALIGNANT NEOPLASM OF UPPER-INNER QUADRANT OF LEFT BREAST IN FEMALE, ESTROGEN RECEPTOR POSITIVE (HCC): ICD-10-CM

## 2022-09-14 DIAGNOSIS — Z79.811 LONG TERM (CURRENT) USE OF AROMATASE INHIBITORS: ICD-10-CM

## 2022-09-14 DIAGNOSIS — D50.8 IRON DEFICIENCY ANEMIA SECONDARY TO INADEQUATE DIETARY IRON INTAKE: ICD-10-CM

## 2022-09-16 RX ORDER — LETROZOLE 2.5 MG/1
TABLET, FILM COATED ORAL
Qty: 90 TABLET | Refills: 0 | Status: SHIPPED | OUTPATIENT
Start: 2022-09-16

## 2022-09-21 NOTE — PROGRESS NOTES
Referral has been faxed to [address] [place] [address] like Dr. Farhat Barriga requested. Conformation has been received.  Fax # [phone]

## 2022-09-22 ENCOUNTER — OFFICE VISIT (OUTPATIENT)
Dept: ORTHOPEDIC SURGERY | Age: 67
End: 2022-09-22
Payer: MEDICARE

## 2022-09-22 VITALS — BODY MASS INDEX: 23.4 KG/M2 | TEMPERATURE: 97.7 F | HEIGHT: 69 IN | WEIGHT: 158 LBS

## 2022-09-22 DIAGNOSIS — M67.441 GANGLION OF HAND, RIGHT: Primary | ICD-10-CM

## 2022-09-22 PROCEDURE — 1123F ACP DISCUSS/DSCN MKR DOCD: CPT | Performed by: ORTHOPAEDIC SURGERY

## 2022-09-22 PROCEDURE — G8432 DEP SCR NOT DOC, RNG: HCPCS | Performed by: ORTHOPAEDIC SURGERY

## 2022-09-22 PROCEDURE — 99203 OFFICE O/P NEW LOW 30 MIN: CPT | Performed by: ORTHOPAEDIC SURGERY

## 2022-09-22 PROCEDURE — 1101F PT FALLS ASSESS-DOCD LE1/YR: CPT | Performed by: ORTHOPAEDIC SURGERY

## 2022-09-22 PROCEDURE — G8420 CALC BMI NORM PARAMETERS: HCPCS | Performed by: ORTHOPAEDIC SURGERY

## 2022-09-22 PROCEDURE — G8399 PT W/DXA RESULTS DOCUMENT: HCPCS | Performed by: ORTHOPAEDIC SURGERY

## 2022-09-22 PROCEDURE — G8427 DOCREV CUR MEDS BY ELIG CLIN: HCPCS | Performed by: ORTHOPAEDIC SURGERY

## 2022-09-22 PROCEDURE — 3017F COLORECTAL CA SCREEN DOC REV: CPT | Performed by: ORTHOPAEDIC SURGERY

## 2022-09-22 PROCEDURE — 20612 ASPIRATE/INJ GANGLION CYST: CPT | Performed by: ORTHOPAEDIC SURGERY

## 2022-09-22 PROCEDURE — 1090F PRES/ABSN URINE INCON ASSESS: CPT | Performed by: ORTHOPAEDIC SURGERY

## 2022-09-22 PROCEDURE — G8536 NO DOC ELDER MAL SCRN: HCPCS | Performed by: ORTHOPAEDIC SURGERY

## 2022-09-22 NOTE — PROGRESS NOTES
Hanna Layton is a 75 y.o. female  . Worker's Compensation and legal considerations: none filed. Vitals:    09/22/22 1530   Temp: 97.7 °F (36.5 °C)   TempSrc: Temporal   Weight: 158 lb (71.7 kg)   Height: 5' 9\" (1.753 m)   PainSc:   0 - No pain   PainLoc: Hand           Chief Complaint   Patient presents with    Hand Pain     Rt          HPI: Patient presents today with complaints of a bump on the back of her right hand. She reports it did not be painful but to be irritating. Date of onset: Indeterminate    Injury: No    Prior Treatment:  No    Numbness/ Tingling: No      ROS: Review of Systems - General ROS: negative  Psychological ROS: negative  ENT ROS: negative  Allergy and Immunology ROS: negative  Hematological and Lymphatic ROS: negative  Respiratory ROS: no cough, shortness of breath, or wheezing  Cardiovascular ROS: no chest pain or dyspnea on exertion  Gastrointestinal ROS: no abdominal pain, change in bowel habits, or black or bloody stools  Musculoskeletal ROS: negative  Neurological ROS: negative  Dermatological ROS: negative    Past Medical History:   Diagnosis Date    Anemia     Cancer ([facility] [address]        Past Surgical History:   Procedure Laterality Date    HX HYSTERECTOMY      [place] BREAST SURGERY PROCEDURE UNLISTED         Current Outpatient Medications   Medication Sig Dispense Refill    letrozole (FEMARA) 2.5 mg tablet Take 1 tablet by mouth once daily 90 Tablet 0    amLODIPine (NORVASC) 2.5 mg tablet TAKE 1 TABLET BY MOUTH ONCE DAILY      Biotin 2,500 mcg cap Take  by mouth. Take 1,000 daily      aspirin delayed-release 81 mg tablet Take 81 mg by mouth daily.       GaviLyte-G 236-22.74-6.74 -5.86 gram suspension AS PER PREP INSTRUCTIONS      losartan (COZAAR) 50 mg tablet TAKE 1 TABLET BY MOUTH NIGHTLY FOR HIGH BLOOD PRESSURE 30 Tab 0    ciclopirox (LOPROX) 0.77 % topical cream APPLY CREAM TO SOLES AND SIDES OF FEET TWICE DAILY  99    terbinafine HCl (LAMISIL) 250 mg tablet TAKE 1 TABLET BY MOUTH ONCE DAILY  0    cholecalciferol (VITAMIN D3) 1,000 unit tablet 1,000 Units.  Ferrous Fumarate 325 mg (106 mg iron) tab Take  by Mouth Once a Day.  docosahexanoic acid/epa (FISH OIL PO) Take  by Mouth Once a Day.  metFORMIN (GLUMETZA ER) 500 mg TG24 24 hour tablet Take 1 Tab by Mouth Once a Day.  multivitamin (ONE A DAY) tablet Take 1 Tab by Mouth Once a Day.  simvastatin (ZOCOR) 40 mg tablet 20 mg.      triamterene-hydroCHLOROthiazide (MAXZIDE) 75-50 mg per tablet        Current Facility-Administered Medications   Medication Dose Route Frequency Provider Last Rate Last Admin    triamcinolone acetonide (KENALOG) 10 mg/mL injection 5 mg  5 mg Other ONCE Gerson Kim, DO           No Known Allergies        PE:     Physical Exam  Vitals and nursing note reviewed. Constitutional:       General: She is not in acute distress. Appearance: Normal appearance. She is not ill-appearing. Cardiovascular:      Pulses: Normal pulses. Pulmonary:      Effort: Pulmonary effort is normal. No respiratory distress. Musculoskeletal:         General: Swelling present. No tenderness, deformity or signs of injury. Normal range of motion. Cervical back: Normal range of motion and neck supple. Right lower leg: No edema. Left lower leg: No edema. Skin:     General: Skin is warm and dry. Capillary Refill: Capillary refill takes less than 2 seconds. Findings: No bruising or erythema. Neurological:      General: No focal deficit present. Mental Status: She is alert and oriented to person, place, and time. Psychiatric:         Mood and Affect: Mood normal.         Behavior: Behavior normal.          Right hand: There is a 2 cm mass over the dorsum of the hand centrally. It is semicompressible and nontender to palpation. It is immobile. Imaging:     None indicated today.         ICD-10-CM ICD-9-CM    1. Ganglion of hand, right  M67.441 727.41 ASPIRAT/INJECTION GANGLION CYST(S)      triamcinolone acetonide (KENALOG) 10 mg/mL injection 5 mg            Plan:     Right hand dorsal ganglion injection and decompression. Follow-up and Dispositions    Return if symptoms worsen or fail to improve. Plan was reviewed with patient, who verbalized agreement and understanding of the plan    [address] [facility] NOTE        Chart reviewed for the following:   Gerson GONZALEZ DO, have reviewed the History, Physical and updated the Allergic reactions for [address] performed immediately prior to start of procedure:   Indu GONZALEZ DO, have performed the following reviews on [facility] prior to the start of the procedure:            * Patient was identified by name and date of birth   * Agreement on procedure being performed was verified  * Risks and Benefits explained to the patient  * Procedure site verified and marked as necessary  * Patient was positioned for comfort  * Consent was signed and verified     Time: 15:48      Date of procedure: 9/22/2022    Procedure performed by: Indu Nugent DO    Provider assisted by: Meg Henry MA    Patient assisted by: self    How tolerated by patient: tolerated the procedure well with no complications    Post Procedural Pain Scale: 0 - No Hurt    Comments: none    Procedure:  After consent was obtained, using sterile technique the right hand was prepped. Local anesthetic used: 1% lidocaine. Kenalog 5 mg and was then injected and the needle withdrawn. The procedure was well tolerated. The patient is asked to continue to rest the area for a few more days before resuming regular activities. It may be more painful for the first 1-2 days. Watch for fever, or increased swelling or persistent pain in the joint. Call or return to clinic prn if such symptoms occur or there is failure to improve as anticipated.

## 2022-10-24 ENCOUNTER — APPOINTMENT (OUTPATIENT)
Dept: ONCOLOGY | Age: 67
End: 2022-10-24

## 2022-10-24 ENCOUNTER — HOSPITAL ENCOUNTER (OUTPATIENT)
Dept: LAB | Age: 67
Discharge: HOME | End: 2022-10-24
Payer: MEDICARE

## 2022-10-24 DIAGNOSIS — Z17.0 MALIGNANT NEOPLASM OF UPPER-INNER QUADRANT OF LEFT BREAST IN FEMALE, ESTROGEN RECEPTOR POSITIVE (HCC): ICD-10-CM

## 2022-10-24 DIAGNOSIS — D64.9 CHRONIC ANEMIA: ICD-10-CM

## 2022-10-24 DIAGNOSIS — C50.212 MALIGNANT NEOPLASM OF UPPER-INNER QUADRANT OF LEFT BREAST IN FEMALE, ESTROGEN RECEPTOR POSITIVE (HCC): ICD-10-CM

## 2022-10-24 LAB
ALBUMIN SERPL-MCNC: 4.4 G/DL (ref 3.4–5)
ALBUMIN/GLOB SERPL: 1.3 {RATIO} (ref 0.8–1.7)
ALP SERPL-CCNC: 98 U/L (ref 45–117)
ALT SERPL-CCNC: 20 U/L (ref 13–56)
ANION GAP SERPL CALC-SCNC: 4 MMOL/L (ref 3–18)
AST SERPL-CCNC: 17 U/L (ref 10–38)
BASOPHILS # BLD: 0.1 K/UL (ref 0–0.1)
BASOPHILS NFR BLD: 1 % (ref 0–2)
BILIRUB SERPL-MCNC: 0.5 MG/DL (ref 0.2–1)
BUN SERPL-MCNC: 19 MG/DL (ref 7–18)
BUN/CREAT SERPL: 29 (ref 12–20)
CALCIUM SERPL-MCNC: 9.8 MG/DL (ref 8.5–10.1)
CHLORIDE SERPL-SCNC: 105 MMOL/L (ref 100–111)
CO2 SERPL-SCNC: 32 MMOL/L (ref 21–32)
CREAT SERPL-MCNC: 0.66 MG/DL (ref 0.6–1.3)
DIFFERENTIAL METHOD BLD: ABNORMAL
EOSINOPHIL # BLD: 0.1 K/UL (ref 0–0.4)
EOSINOPHIL NFR BLD: 2 % (ref 0–5)
ERYTHROCYTE [DISTWIDTH] IN BLOOD BY AUTOMATED COUNT: 14 % (ref 11.6–14.5)
FERRITIN SERPL-MCNC: 514 NG/ML (ref 8–388)
GLOBULIN SER CALC-MCNC: 3.5 G/DL (ref 2–4)
GLUCOSE SERPL-MCNC: 71 MG/DL (ref 74–99)
HCT VFR BLD AUTO: 39.7 % (ref 35–45)
HGB BLD-MCNC: 12.5 G/DL (ref 12–16)
IMM GRANULOCYTES # BLD AUTO: 0 K/UL (ref 0–0.04)
IMM GRANULOCYTES NFR BLD AUTO: 0 % (ref 0–0.5)
IRON SATN MFR SERPL: 22 % (ref 20–50)
IRON SERPL-MCNC: 67 UG/DL (ref 50–175)
LYMPHOCYTES # BLD: 1.1 K/UL (ref 0.9–3.6)
LYMPHOCYTES NFR BLD: 20 % (ref 21–52)
MCH RBC QN AUTO: 30 PG (ref 24–34)
MCHC RBC AUTO-ENTMCNC: 31.5 G/DL (ref 31–37)
MCV RBC AUTO: 95.4 FL (ref 78–100)
MONOCYTES # BLD: 0.5 K/UL (ref 0.05–1.2)
MONOCYTES NFR BLD: 9 % (ref 3–10)
NEUTS SEG # BLD: 3.7 K/UL (ref 1.8–8)
NEUTS SEG NFR BLD: 68 % (ref 40–73)
NRBC # BLD: 0 K/UL (ref 0–0.01)
NRBC BLD-RTO: 0 PER 100 WBC
PLATELET # BLD AUTO: 189 K/UL (ref 135–420)
PMV BLD AUTO: 12.4 FL (ref 9.2–11.8)
POTASSIUM SERPL-SCNC: 4.3 MMOL/L (ref 3.5–5.5)
PROT SERPL-MCNC: 7.9 G/DL (ref 6.4–8.2)
RBC # BLD AUTO: 4.16 M/UL (ref 4.2–5.3)
SODIUM SERPL-SCNC: 141 MMOL/L (ref 136–145)
TIBC SERPL-MCNC: 300 UG/DL (ref 250–450)
WBC # BLD AUTO: 5.4 K/UL (ref 4.6–13.2)

## 2022-10-24 PROCEDURE — 82728 ASSAY OF FERRITIN: CPT

## 2022-10-24 PROCEDURE — 80053 COMPREHEN METABOLIC PANEL: CPT

## 2022-10-24 PROCEDURE — 83540 ASSAY OF IRON: CPT

## 2022-10-24 PROCEDURE — 86300 IMMUNOASSAY TUMOR CA 15-3: CPT

## 2022-10-24 PROCEDURE — 85025 COMPLETE CBC W/AUTO DIFF WBC: CPT

## 2022-10-24 PROCEDURE — 36415 COLL VENOUS BLD VENIPUNCTURE: CPT

## 2022-10-25 LAB — CANCER AG27-29 SERPL-ACNC: 16.8 U/ML (ref 0–38.6)

## 2022-11-14 ENCOUNTER — OFFICE VISIT (OUTPATIENT)
Dept: ONCOLOGY | Age: 67
End: 2022-11-14
Payer: MEDICARE

## 2022-11-14 VITALS
OXYGEN SATURATION: 100 % | SYSTOLIC BLOOD PRESSURE: 136 MMHG | BODY MASS INDEX: 23.4 KG/M2 | DIASTOLIC BLOOD PRESSURE: 69 MMHG | RESPIRATION RATE: 16 BRPM | WEIGHT: 158 LBS | HEART RATE: 64 BPM | HEIGHT: 69 IN

## 2022-11-14 DIAGNOSIS — C50.212 MALIGNANT NEOPLASM OF UPPER-INNER QUADRANT OF LEFT FEMALE BREAST, UNSPECIFIED ESTROGEN RECEPTOR STATUS (HCC): ICD-10-CM

## 2022-11-14 DIAGNOSIS — C50.212 MALIGNANT NEOPLASM OF UPPER-INNER QUADRANT OF LEFT BREAST IN FEMALE, ESTROGEN RECEPTOR POSITIVE (HCC): ICD-10-CM

## 2022-11-14 DIAGNOSIS — Z79.811 LONG TERM (CURRENT) USE OF AROMATASE INHIBITORS: ICD-10-CM

## 2022-11-14 DIAGNOSIS — Z17.0 MALIGNANT NEOPLASM OF UPPER-INNER QUADRANT OF LEFT BREAST IN FEMALE, ESTROGEN RECEPTOR POSITIVE (HCC): ICD-10-CM

## 2022-11-14 DIAGNOSIS — D50.8 IRON DEFICIENCY ANEMIA SECONDARY TO INADEQUATE DIETARY IRON INTAKE: ICD-10-CM

## 2022-11-14 PROCEDURE — 99214 OFFICE O/P EST MOD 30 MIN: CPT | Performed by: NURSE PRACTITIONER

## 2022-11-14 PROCEDURE — G8536 NO DOC ELDER MAL SCRN: HCPCS | Performed by: NURSE PRACTITIONER

## 2022-11-14 PROCEDURE — G8420 CALC BMI NORM PARAMETERS: HCPCS | Performed by: NURSE PRACTITIONER

## 2022-11-14 PROCEDURE — 1101F PT FALLS ASSESS-DOCD LE1/YR: CPT | Performed by: NURSE PRACTITIONER

## 2022-11-14 PROCEDURE — G8432 DEP SCR NOT DOC, RNG: HCPCS | Performed by: NURSE PRACTITIONER

## 2022-11-14 PROCEDURE — G0463 HOSPITAL OUTPT CLINIC VISIT: HCPCS | Performed by: NURSE PRACTITIONER

## 2022-11-14 PROCEDURE — G8427 DOCREV CUR MEDS BY ELIG CLIN: HCPCS | Performed by: NURSE PRACTITIONER

## 2022-11-14 PROCEDURE — 1090F PRES/ABSN URINE INCON ASSESS: CPT | Performed by: NURSE PRACTITIONER

## 2022-11-14 PROCEDURE — 1123F ACP DISCUSS/DSCN MKR DOCD: CPT | Performed by: NURSE PRACTITIONER

## 2022-11-14 PROCEDURE — 3017F COLORECTAL CA SCREEN DOC REV: CPT | Performed by: NURSE PRACTITIONER

## 2022-11-14 PROCEDURE — G8399 PT W/DXA RESULTS DOCUMENT: HCPCS | Performed by: NURSE PRACTITIONER

## 2022-11-14 RX ORDER — LETROZOLE 2.5 MG/1
2.5 TABLET, FILM COATED ORAL DAILY
Qty: 90 TABLET | Refills: 1 | Status: SHIPPED | OUTPATIENT
Start: 2022-11-14

## 2022-11-14 NOTE — PROGRESS NOTES
Hematology/Oncology  Progress Note    Name: Torie Petit  : 1955   DATE: 22    PCP: Zoe Lang MD     Ms. Francis King is a 75 y.o. female who was seen for management of her invasive ductal adenocarcinoma of the left breast.    Current therapy: Femara 2.5mg PO daily started May 2017    Subjective:   Ms. Torie Petit is a 75 y.o. female who was seen for management of her Invasive Ductal Adenocarcinoma of the Left Breast. She is currently taking Letrozole 2.5mg PO daily and she states she is tolerating this well without any side effects. Today denies any fevers, chills, recurrent infections, and skin rash. She denies shortness of breath, dizziness, chest pains, fatigue, and palpitations. She denies any abdominal pains, nausea, vomiting, diarrhea, and constipation. She denies pain or any discomfort or breast issues      Past medical history, family history, and social history: these were reviewed and remains unchanged.     Past Medical History:   Diagnosis Date    Anemia     Cancer ([facility] [address]      Past Surgical History:   Procedure Laterality Date    HX HYSTERECTOMY      SC BREAST SURGERY PROCEDURE UNLISTED       Social History     Socioeconomic History    Marital status:      Spouse name: Not on file    Number of children: Not on file    Years of education: Not on file    Highest education level: Not on file   Occupational History    Not on file   Tobacco Use    Smoking status: Never    Smokeless tobacco: Never   Substance and Sexual Activity    Alcohol use: No    Drug use: Not Currently    Sexual activity: Not on file   Other Topics Concern    Not on file   Social History Narrative    Not on file     Social Determinants of Health     Financial Resource Strain: Not on file   Food Insecurity: Not on file   Transportation Needs: Not on file   Physical Activity: Not on file   Stress: Not on file   Social Connections: Not on file   Intimate Partner Violence: Not on file   Housing Stability: Not on file Family History   Problem Relation Age of Onset    Heart Disease Mother     Cancer Mother         colon    Diabetes Father     Cancer Brother         prostate    Alzheimer's Disease Paternal Uncle     Other Maternal Grandfather         old age    [de-identified] Brother         colon    Alzheimer's Disease Cousin      Current Outpatient Medications   Medication Sig Dispense Refill    letrozole (FEMARA) 2.5 mg tablet Take 1 tablet by mouth once daily 90 Tablet 0    amLODIPine (NORVASC) 2.5 mg tablet TAKE 1 TABLET BY MOUTH ONCE DAILY      Biotin 2,500 mcg cap Take  by mouth. Take 1,000 daily      aspirin delayed-release 81 mg tablet Take 81 mg by mouth daily. GaviLyte-G 236-22.74-6.74 -5.86 gram suspension AS PER PREP INSTRUCTIONS      losartan (COZAAR) 50 mg tablet TAKE 1 TABLET BY MOUTH NIGHTLY FOR HIGH BLOOD PRESSURE 30 Tab 0    ciclopirox (LOPROX) 0.77 % topical cream APPLY CREAM TO SOLES AND SIDES OF FEET TWICE DAILY  99    cholecalciferol (VITAMIN D3) 1,000 unit tablet 1,000 Units. Ferrous Fumarate 325 mg (106 mg iron) tab Take  by Mouth Once a Day. docosahexanoic acid/epa (FISH OIL PO) Take  by Mouth Once a Day. multivitamin (ONE A DAY) tablet Take 1 Tab by Mouth Once a Day. simvastatin (ZOCOR) 40 mg tablet 20 mg.      triamterene-hydroCHLOROthiazide (MAXZIDE) 75-50 mg per tablet          Review of Systems   Constitutional:  Positive for weight loss. Negative for chills, diaphoresis, fever and malaise/fatigue. Respiratory:  Negative for cough, hemoptysis, shortness of breath and wheezing. Cardiovascular:  Negative for chest pain, palpitations and leg swelling. Gastrointestinal:  Negative for abdominal pain, diarrhea, heartburn, nausea and vomiting. Genitourinary:  Negative for dysuria, frequency, hematuria and urgency. Musculoskeletal:  Negative for joint pain and myalgias. Skin:  Negative for itching and rash.    Neurological:  Negative for dizziness, seizures, weakness and headaches. Psychiatric/Behavioral:  Negative for depression. The patient does not have insomnia. Objective:     Visit Vitals  /69   Pulse 64   Resp 16   Ht 5' 9\" (1.753 m)   Wt 71.7 kg (158 lb)   SpO2 100%   BMI 23.33 kg/m²       ECOG Performance Status (grade): 0  0 - able to carry on all pre-disease activity w/out restriction  1 - restricted but able to carry out light work  2 - ambulatory and can self- care but unable to carry out work  3 - bed or chair >50% of waking hours  4 - completely disable, total care, confined to bed or chair    Physical Exam  Constitutional:       Appearance: Normal appearance. HENT:      Head: Normocephalic and atraumatic. Eyes:      Pupils: Pupils are equal, round, and reactive to light. Cardiovascular:      Rate and Rhythm: Normal rate and regular rhythm. Heart sounds: Normal heart sounds. Pulmonary:      Effort: Pulmonary effort is normal.      Breath sounds: Normal breath sounds. Chest:      Comments: Breast bilateral: No discoloration, No axillary or supraclavicular lymphadenopathy or mass  Abdominal:      General: Bowel sounds are normal.      Palpations: Abdomen is soft. Tenderness: There is no abdominal tenderness. There is no guarding. Musculoskeletal:         General: Normal range of motion. Cervical back: Neck supple. Skin:     General: Skin is warm. Neurological:      General: No focal deficit present. Mental Status: She is alert and oriented to person, place, and time. Mental status is at baseline. DEXA Results (most recent):  Results from Hospital Encounter encounter on 07/09/20    DEXA BONE DENSITY STUDY AXIAL    Narrative  DEXA BONE DENSITOMETRY, CENTRAL    CLINICAL INDICATION/HISTORY: Post menopausal. 70-year-old female for baseline  study. History of breast cancer.  Taking Femara and vitamin D.    TECHNIQUE: Using GE LUNAR Prodigy densitometer, bone density measurement was  performed in the lumbar spine, the proximal left and right femora. T Score  refers to standard deviations above or below average compared to a young adult  of the same sex. Z Score refers to standard deviations above or below average  compared to a patient of the same sex, age, race and weight. COMPARISON: None available. FINDINGS:    Lumbar Spine Levels: L1-L3  Mean Bone Mineral Density (BMD):  1.065 g/cm2  T Score: -1.0  Z Score: -0.5    On PA image of the lumbar spine obtained for localization of vertebral levels  (not a diagnostic quality radiograph), there is apparent increased density at  L4. The density is not well characterized on the basis of this image, but  likely degenerative. Since this density spuriously increases measured bone  mineral density, this has been excluded. Left Total Proximal Femur BMD: 0.870 g/cm2  T Score: -1.1  Z Score: -1.1    Right Total Proximal Femur BMD: 0.888 g/cm2  T Score: -0.9  Z Score: -1.0    Left Femoral Neck BMD: 0.925  g/cm2  T Score: -0.8  Z Score: -0.5    Right Femoral Neck BMD: 0.925  g/cm2  T Score: -0.8  Z Score: -0.5    Impression  IMPRESSION:    BMD measures consistent with osteopenia/low bone density. Based upon current ISCD guidelines, the patient's overall diagnostic category,  selected using WHO criteria in postmenopausal women and males aged 48 and above,  is selected based upon the lowest T Score from among the lumbar spine, total  femur, femoral neck, (or distal third radius if measured). WHO Definition of Osteoporosis and Osteopenia on DXA (specified for post  menopausal  females):    Normal:                     T Score at or above -1 SD  Osteopenia:              T Score between -1 and -2.5 SD  Osteoporosis:          T Score at or below -2.5 SD    The risk of fracture approximately doubles for each 1 SD decrease in T Score.   It is important to consider other factors in assessing a patient's risk of  fracture, including age, risk of falling/injury, history of fragility fracture,  family history of osteoporosis, smoking, low weight. Various fracture risk tools have been developed for adult patients and are  available online. For example, the FRAX tool developed by [facility] is widely used. Reference www.iscd.org. It is also important to note that DXA measures bone density but does not  distinguish among causes of decreased bone density, which include primary versus  secondary osteoporosis (such as metabolic bone disorders or possible effects of  medications) and also other conditions (such as osteomalacia). Clinical  considerations should determine what additional evaluation may be warranted to  exclude secondary conditions in a patient with low bone density. Please note that reliable, valid comparisons can not be made between studies  which have been performed on different densitometers. If clinically warranted,  follow up study performed at this site would best permit assessment of trend for  possible change in bone mineral density over time in comparison to this study. Thank you for this referral.    [facility] Results (most recent):  Results from [place] encounter on 03/25/16    [facility] [address]    Narrative  PROCEDURE:  Digital Mammograms - Bilateral Screening. CPT:  , [id number]    INDICATION:  Routine screening. COMPARISON:  1/12/15, 1/9/14, 7/8/13    FINDINGS:    - Technique:  Full-field direct digital CC and MLO views. - CAD:  Images are interpreted in conjunction with iCAD (Fora version 7.2  high-setting). Any areas annotated on the CAD are correlated with visualized  mammographic findings using digital magnification of the images and correlation  with comparison studies.  - Breast parenchymal pattern:  Fatty replaced. No dominant mass, suspicious new microcalcification cluster or architectural  distortion is detected. No potential findings are detected on iCAD.   No  significant interval changes are observed. Impression  :    Benign mammograms. Recommend screening mammograms in one year. BIRADS category:  2. Benign. Note: 10% to 15% of breast cancers may not be identified by mammography. Dense  breast parenchyma can obscure an underlying neoplasm and some types of breast  cancers are not well shown on the mammogram.  A negative mammogram report should  not delay further evaluation if clinically suspicious findings, i.e. palpable  firm nodules are present. LABS  Lab Results   Component Value Date/Time    WBC 5.4 10/24/2022 10:06 AM    HGB 12.5 10/24/2022 10:06 AM    HCT 39.7 10/24/2022 10:06 AM    PLATELET 330 76/59/9490 10:06 AM    MCV 95.4 10/24/2022 10:06 AM     Lab Results   Component Value Date/Time    Sodium 141 10/24/2022 10:06 AM    Potassium 4.3 10/24/2022 10:06 AM    Chloride 105 10/24/2022 10:06 AM    CO2 32 10/24/2022 10:06 AM    Anion gap 4 10/24/2022 10:06 AM    Glucose 71 (L) 10/24/2022 10:06 AM    BUN 19 (H) 10/24/2022 10:06 AM    Creatinine 0.66 10/24/2022 10:06 AM    BUN/Creatinine ratio 29 (H) 10/24/2022 10:06 AM    GFR est AA >60 04/22/2022 10:20 AM    GFR est non-AA >60 04/22/2022 10:20 AM    Calcium 9.8 10/24/2022 10:06 AM    Bilirubin, total 0.5 10/24/2022 10:06 AM    Alk.  phosphatase 98 10/24/2022 10:06 AM    Protein, total 7.9 10/24/2022 10:06 AM    Albumin 4.4 10/24/2022 10:06 AM    Globulin 3.5 10/24/2022 10:06 AM    A-G Ratio 1.3 10/24/2022 10:06 AM    ALT (SGPT) 20 10/24/2022 10:06 AM    AST (SGOT) 17 10/24/2022 10:06 AM     Lab Results   Component Value Date/Time    Iron 67 10/24/2022 10:06 AM    TIBC 300 10/24/2022 10:06 AM    Iron % saturation 22 10/24/2022 10:06 AM    Ferritin 514 (H) 10/24/2022 10:06 AM         Assessment:   Invasive ductal adenocarcinoma left breast  Long-term current use of aromatase inhibitors:   Bone Health  Functional iron deficiency anemia  Weight Loss    Plan:   Invasive ductal adenocarcinoma left breast  -- Patient was being followed previously by Dr. Haily Goldstein who retired. -- Clinical stable. Recent lab report was reviewed with patient today. --Mammogram done on 6/17/2021 showed no mammographic evidence of malignancy    Plan:  -- The patient was instructed to continue doing her breast exams on a monthly basis. -- The patient was advised to notify us if any new breast pain, new palpable nodule, nipple inversion, spontaneous nipple discharge especially if bloody, breast skin changes, unintentional weight loss, worsen fatigue, new bone pain or back pain, or any concerns. -- I have advised her to follow up her PCP to continue age-appropriate cancer screening. -- I have educated her regarding lifestyle modifications, minimizing alcohol intake, refraining from smoking, healthy diet, and physical activity. -- The patient will continue follows up with mammogram as recommended. -- Follow up in 6 months, always sooner if required. Long-term current use of aromatase inhibitors  Bone Health  --Femara 2.5mg PO daily will be continued. She would like to remain on the medication for full 10 years   --Advised on adequate amounts of calcium (at least 1,200 mg per day) and vitamin D(800-1,000 IU per day). The higher dose of vitamin D may be needed if vitamin D deficiency. --Recommended regular weight-bearing and muscle-strengthening exercise to reduce the risk of falls and fractures. --Advised avoidance of tobacco smoking and excessive alcohol intake. -- she will continue to follow up on her DEXA scan with her PCP    Functional iron deficiency anemia:   --The patient was advised to continue hold her oral Slow Fe 1 tab for now due to increase Ferritin of 514  --Iron saturation was 22% and ferritin was 514. --Continue to follow up with GI as scheduled           No orders of the defined types were placed in this encounter.         Zachery Monzon DNP    11/14/22        CC: Tyler Kramer NP

## 2023-02-04 DIAGNOSIS — D50.8 IRON DEFICIENCY ANEMIA SECONDARY TO INADEQUATE DIETARY IRON INTAKE: Primary | ICD-10-CM

## 2023-02-04 DIAGNOSIS — C50.212 MALIGNANT NEOPLASM OF UPPER-INNER QUADRANT OF LEFT BREAST IN FEMALE, ESTROGEN RECEPTOR POSITIVE (HCC): ICD-10-CM

## 2023-02-04 DIAGNOSIS — Z79.811 LONG TERM (CURRENT) USE OF AROMATASE INHIBITORS: ICD-10-CM

## 2023-02-04 DIAGNOSIS — Z17.0 MALIGNANT NEOPLASM OF UPPER-INNER QUADRANT OF LEFT BREAST IN FEMALE, ESTROGEN RECEPTOR POSITIVE (HCC): ICD-10-CM

## 2023-02-05 DIAGNOSIS — D50.8 IRON DEFICIENCY ANEMIA SECONDARY TO INADEQUATE DIETARY IRON INTAKE: Primary | ICD-10-CM

## 2023-02-05 DIAGNOSIS — Z79.811 LONG TERM (CURRENT) USE OF AROMATASE INHIBITORS: ICD-10-CM

## 2023-02-05 DIAGNOSIS — Z17.0 MALIGNANT NEOPLASM OF UPPER-INNER QUADRANT OF LEFT BREAST IN FEMALE, ESTROGEN RECEPTOR POSITIVE (HCC): ICD-10-CM

## 2023-02-05 DIAGNOSIS — C50.212 MALIGNANT NEOPLASM OF UPPER-INNER QUADRANT OF LEFT BREAST IN FEMALE, ESTROGEN RECEPTOR POSITIVE (HCC): ICD-10-CM

## 2023-02-06 DIAGNOSIS — Z79.811 LONG TERM (CURRENT) USE OF AROMATASE INHIBITORS: ICD-10-CM

## 2023-02-06 DIAGNOSIS — C50.212 MALIGNANT NEOPLASM OF UPPER-INNER QUADRANT OF LEFT BREAST IN FEMALE, ESTROGEN RECEPTOR POSITIVE (HCC): ICD-10-CM

## 2023-02-06 DIAGNOSIS — D50.8 IRON DEFICIENCY ANEMIA SECONDARY TO INADEQUATE DIETARY IRON INTAKE: Primary | ICD-10-CM

## 2023-02-06 DIAGNOSIS — Z17.0 MALIGNANT NEOPLASM OF UPPER-INNER QUADRANT OF LEFT BREAST IN FEMALE, ESTROGEN RECEPTOR POSITIVE (HCC): ICD-10-CM

## 2023-02-07 DIAGNOSIS — D50.8 IRON DEFICIENCY ANEMIA SECONDARY TO INADEQUATE DIETARY IRON INTAKE: Primary | ICD-10-CM

## 2023-02-07 DIAGNOSIS — Z79.811 LONG TERM (CURRENT) USE OF AROMATASE INHIBITORS: ICD-10-CM

## 2023-02-07 DIAGNOSIS — Z17.0 MALIGNANT NEOPLASM OF UPPER-INNER QUADRANT OF LEFT BREAST IN FEMALE, ESTROGEN RECEPTOR POSITIVE (HCC): ICD-10-CM

## 2023-02-07 DIAGNOSIS — C50.212 MALIGNANT NEOPLASM OF UPPER-INNER QUADRANT OF LEFT BREAST IN FEMALE, ESTROGEN RECEPTOR POSITIVE (HCC): ICD-10-CM

## 2023-05-03 ENCOUNTER — HOSPITAL ENCOUNTER (OUTPATIENT)
Facility: HOSPITAL | Age: 68
Setting detail: SPECIMEN
Discharge: HOME | End: 2023-05-06
Payer: MEDICARE

## 2023-05-03 DIAGNOSIS — Z79.811 LONG TERM (CURRENT) USE OF AROMATASE INHIBITORS: ICD-10-CM

## 2023-05-03 DIAGNOSIS — D50.8 OTHER IRON DEFICIENCY ANEMIAS: ICD-10-CM

## 2023-05-03 DIAGNOSIS — E55.9 VITAMIN D DEFICIENCY: ICD-10-CM

## 2023-05-03 DIAGNOSIS — C50.212 MALIGNANT NEOPLASM OF UPPER-INNER QUADRANT OF LEFT FEMALE BREAST, UNSPECIFIED ESTROGEN RECEPTOR STATUS (HCC): ICD-10-CM

## 2023-05-03 LAB
25(OH)D3 SERPL-MCNC: 29.1 NG/ML (ref 30–100)
ALBUMIN SERPL-MCNC: 3.9 G/DL (ref 3.4–5)
ALBUMIN/GLOB SERPL: 1.2 (ref 0.8–1.7)
ALP SERPL-CCNC: 97 U/L (ref 45–117)
ALT SERPL-CCNC: 18 U/L (ref 13–56)
ANION GAP SERPL CALC-SCNC: 3 MMOL/L (ref 3–18)
AST SERPL-CCNC: 16 U/L (ref 10–38)
BASOPHILS # BLD: 0 K/UL (ref 0–0.1)
BASOPHILS NFR BLD: 1 % (ref 0–2)
BILIRUB SERPL-MCNC: 0.6 MG/DL (ref 0.2–1)
BUN SERPL-MCNC: 16 MG/DL (ref 7–18)
BUN/CREAT SERPL: 22 (ref 12–20)
CALCIUM SERPL-MCNC: 9.6 MG/DL (ref 8.5–10.1)
CHLORIDE SERPL-SCNC: 109 MMOL/L (ref 100–111)
CO2 SERPL-SCNC: 29 MMOL/L (ref 21–32)
CREAT SERPL-MCNC: 0.72 MG/DL (ref 0.6–1.3)
DIFFERENTIAL METHOD BLD: ABNORMAL
EOSINOPHIL # BLD: 0.1 K/UL (ref 0–0.4)
EOSINOPHIL NFR BLD: 2 % (ref 0–5)
ERYTHROCYTE [DISTWIDTH] IN BLOOD BY AUTOMATED COUNT: 14.2 % (ref 11.6–14.5)
FERRITIN SERPL-MCNC: 382 NG/ML (ref 8–388)
GLOBULIN SER CALC-MCNC: 3.3 G/DL (ref 2–4)
GLUCOSE SERPL-MCNC: 86 MG/DL (ref 74–99)
HCT VFR BLD AUTO: 36.7 % (ref 35–45)
HGB BLD-MCNC: 12 G/DL (ref 12–16)
IMM GRANULOCYTES # BLD AUTO: 0 K/UL (ref 0–0.04)
IMM GRANULOCYTES NFR BLD AUTO: 0 % (ref 0–0.5)
IRON SATN MFR SERPL: 21 % (ref 20–50)
IRON SERPL-MCNC: 63 UG/DL (ref 50–175)
LYMPHOCYTES # BLD: 1.1 K/UL (ref 0.9–3.6)
LYMPHOCYTES NFR BLD: 24 % (ref 21–52)
MCH RBC QN AUTO: 31.2 PG (ref 24–34)
MCHC RBC AUTO-ENTMCNC: 32.7 G/DL (ref 31–37)
MCV RBC AUTO: 95.3 FL (ref 78–100)
MONOCYTES # BLD: 0.5 K/UL (ref 0.05–1.2)
MONOCYTES NFR BLD: 11 % (ref 3–10)
NEUTS SEG # BLD: 2.9 K/UL (ref 1.8–8)
NEUTS SEG NFR BLD: 63 % (ref 40–73)
NRBC # BLD: 0 K/UL (ref 0–0.01)
NRBC BLD-RTO: 0 PER 100 WBC
PLATELET # BLD AUTO: 175 K/UL (ref 135–420)
PMV BLD AUTO: 13 FL (ref 9.2–11.8)
POTASSIUM SERPL-SCNC: 4.1 MMOL/L (ref 3.5–5.5)
PROT SERPL-MCNC: 7.2 G/DL (ref 6.4–8.2)
RBC # BLD AUTO: 3.85 M/UL (ref 4.2–5.3)
SODIUM SERPL-SCNC: 141 MMOL/L (ref 136–145)
TIBC SERPL-MCNC: 305 UG/DL (ref 250–450)
WBC # BLD AUTO: 4.6 K/UL (ref 4.6–13.2)

## 2023-05-03 PROCEDURE — 83550 IRON BINDING TEST: CPT

## 2023-05-03 PROCEDURE — 80053 COMPREHEN METABOLIC PANEL: CPT

## 2023-05-03 PROCEDURE — 82728 ASSAY OF FERRITIN: CPT

## 2023-05-03 PROCEDURE — 85025 COMPLETE CBC W/AUTO DIFF WBC: CPT

## 2023-05-03 PROCEDURE — 36415 COLL VENOUS BLD VENIPUNCTURE: CPT

## 2023-05-03 PROCEDURE — 83540 ASSAY OF IRON: CPT

## 2023-05-03 PROCEDURE — 82306 VITAMIN D 25 HYDROXY: CPT

## 2023-05-03 PROCEDURE — 86300 IMMUNOASSAY TUMOR CA 15-3: CPT

## 2023-05-04 LAB — CANCER AG27-29 SERPL-ACNC: 12.7 U/ML (ref 0–38.6)

## 2023-05-16 ENCOUNTER — OFFICE VISIT (OUTPATIENT)
Age: 68
End: 2023-05-16
Payer: MEDICARE

## 2023-05-16 VITALS
DIASTOLIC BLOOD PRESSURE: 86 MMHG | HEIGHT: 69 IN | SYSTOLIC BLOOD PRESSURE: 147 MMHG | HEART RATE: 57 BPM | BODY MASS INDEX: 23.91 KG/M2 | RESPIRATION RATE: 16 BRPM | OXYGEN SATURATION: 98 % | WEIGHT: 161.4 LBS

## 2023-05-16 DIAGNOSIS — Z79.811 LONG TERM (CURRENT) USE OF AROMATASE INHIBITORS: ICD-10-CM

## 2023-05-16 DIAGNOSIS — E55.9 VITAMIN D DEFICIENCY: ICD-10-CM

## 2023-05-16 DIAGNOSIS — Z17.0 ESTROGEN RECEPTOR POSITIVE STATUS (ER+): ICD-10-CM

## 2023-05-16 DIAGNOSIS — C50.212 MALIGNANT NEOPLASM OF UPPER-INNER QUADRANT OF LEFT FEMALE BREAST, UNSPECIFIED ESTROGEN RECEPTOR STATUS (HCC): Primary | ICD-10-CM

## 2023-05-16 PROCEDURE — 1090F PRES/ABSN URINE INCON ASSESS: CPT | Performed by: INTERNAL MEDICINE

## 2023-05-16 PROCEDURE — 1036F TOBACCO NON-USER: CPT | Performed by: INTERNAL MEDICINE

## 2023-05-16 PROCEDURE — 3017F COLORECTAL CA SCREEN DOC REV: CPT | Performed by: INTERNAL MEDICINE

## 2023-05-16 PROCEDURE — 99213 OFFICE O/P EST LOW 20 MIN: CPT | Performed by: INTERNAL MEDICINE

## 2023-05-16 PROCEDURE — G8399 PT W/DXA RESULTS DOCUMENT: HCPCS | Performed by: INTERNAL MEDICINE

## 2023-05-16 PROCEDURE — G8420 CALC BMI NORM PARAMETERS: HCPCS | Performed by: INTERNAL MEDICINE

## 2023-05-16 PROCEDURE — 1123F ACP DISCUSS/DSCN MKR DOCD: CPT | Performed by: INTERNAL MEDICINE

## 2023-05-16 PROCEDURE — G8427 DOCREV CUR MEDS BY ELIG CLIN: HCPCS | Performed by: INTERNAL MEDICINE

## 2023-05-16 RX ORDER — LOSARTAN POTASSIUM 100 MG/1
100 TABLET ORAL DAILY
COMMUNITY
Start: 2023-05-02

## 2023-05-16 RX ORDER — LETROZOLE 2.5 MG/1
2.5 TABLET, FILM COATED ORAL DAILY
Qty: 90 TABLET | Refills: 1 | Status: SHIPPED | OUTPATIENT
Start: 2023-05-16

## 2023-05-16 ASSESSMENT — ENCOUNTER SYMPTOMS
GASTROINTESTINAL NEGATIVE: 1
EYES NEGATIVE: 1
RESPIRATORY NEGATIVE: 1

## 2023-05-16 ASSESSMENT — PATIENT HEALTH QUESTIONNAIRE - PHQ9
SUM OF ALL RESPONSES TO PHQ QUESTIONS 1-9: 0
SUM OF ALL RESPONSES TO PHQ QUESTIONS 1-9: 0
1. LITTLE INTEREST OR PLEASURE IN DOING THINGS: 0
SUM OF ALL RESPONSES TO PHQ QUESTIONS 1-9: 0
2. FEELING DOWN, DEPRESSED OR HOPELESS: 0
SUM OF ALL RESPONSES TO PHQ QUESTIONS 1-9: 0
SUM OF ALL RESPONSES TO PHQ9 QUESTIONS 1 & 2: 0

## 2023-05-16 NOTE — PROGRESS NOTES
Hematology/Oncology  Progress Note    Name: German Wiley  Date: 2023  : 1955    LEONOR Jones NP     Ms. Bre Diallo is a 70y.o. year old female who was seen for management of her invasive ductal adenocarcinoma of the left breast     Current therapy: Femara 2.5mg PO daily started May 2017  Subjective:   Ms. German Wiley is a 70 y.o. female who was seen for management of her Invasive Ductal Adenocarcinoma of the Left Breast. She is currently taking Letrozole 2.5mg PO daily. She report tolerance and adherence medication. Today she reported doing well. She denies any fevers, chills, recurrent infections, and skin rash. She denies shortness of breath, dizziness, chest pains, fatigue, and palpitations. She denies any abdominal pains, nausea, vomiting, diarrhea, and constipation. She does not have any other concerns or complaints to report at this time. Past medical history, family history, and social history: these were reviewed and remains unchanged.     Past Medical History:   Diagnosis Date    Anemia     Cancer [facility])      Past Surgical History:   Procedure Laterality Date    BREAST SURGERY      HYSTERECTOMY (CERVIX STATUS UNKNOWN)       Social History     Socioeconomic History    Marital status:      Spouse name: Not on file    Number of children: Not on file    Years of education: Not on file    Highest education level: Not on file   Occupational History    Not on file   Tobacco Use    Smoking status: Never    Smokeless tobacco: Never   Substance and Sexual Activity    Alcohol use: No    Drug use: Not Currently    Sexual activity: Not on file   Other Topics Concern    Not on file   Social History Narrative    Not on file     Social Determinants of Health     Financial Resource Strain: Not on file   Food Insecurity: Not on file   Transportation Needs: Not on file   Physical Activity: Not on file   Stress: Not on file   Social Connections: Not on file   Intimate Partner Violence:

## 2023-05-20 ASSESSMENT — ENCOUNTER SYMPTOMS
SHORTNESS OF BREATH: 0
COUGH: 0
BACK PAIN: 0
ABDOMINAL PAIN: 0
VOMITING: 0
DIARRHEA: 0
NAUSEA: 0

## 2024-06-07 ENCOUNTER — TRANSCRIBE ORDERS (OUTPATIENT)
Facility: HOSPITAL | Age: 69
End: 2024-06-07

## 2024-06-07 DIAGNOSIS — Z12.31 VISIT FOR SCREENING MAMMOGRAM: Primary | ICD-10-CM

## 2024-08-19 ENCOUNTER — HOSPITAL ENCOUNTER (OUTPATIENT)
Facility: HOSPITAL | Age: 69
Discharge: HOME | End: 2024-08-22
Attending: INTERNAL MEDICINE
Payer: MEDICARE

## 2024-08-19 DIAGNOSIS — R63.4 ABNORMAL WEIGHT LOSS: ICD-10-CM

## 2024-08-19 DIAGNOSIS — R19.00 INTRA-ABDOMINAL AND PELVIC SWELLING, MASS AND LUMP, UNSPECIFIED SITE: ICD-10-CM

## 2024-08-19 PROCEDURE — 74177 CT ABD & PELVIS W/CONTRAST: CPT

## 2024-08-19 PROCEDURE — 6360000004 HC RX CONTRAST MEDICATION: Performed by: INTERNAL MEDICINE

## 2024-08-19 RX ADMIN — IOPAMIDOL 100 ML: 612 INJECTION, SOLUTION INTRAVENOUS at 10:30

## 2024-08-19 RX ADMIN — DIATRIZOATE MEGLUMINE AND DIATRIZOATE SODIUM 30 ML: 660; 100 LIQUID ORAL; RECTAL at 08:20

## 2024-11-21 ENCOUNTER — HOSPITAL ENCOUNTER (OUTPATIENT)
Facility: HOSPITAL | Age: 69
Discharge: HOME | End: 2024-11-21
Attending: INTERNAL MEDICINE
Payer: MEDICARE

## 2024-11-21 DIAGNOSIS — R41.3 OTHER AMNESIA: ICD-10-CM

## 2024-11-21 DIAGNOSIS — F02.A4 DEMENTIA IN OTHER DISEASES CLASSIFIED ELSEWHERE, MILD, WITH ANXIETY (HCC): ICD-10-CM

## 2024-11-21 PROCEDURE — 70551 MRI BRAIN STEM W/O DYE: CPT
